# Patient Record
Sex: FEMALE | Race: WHITE | NOT HISPANIC OR LATINO | Employment: UNEMPLOYED | URBAN - NONMETROPOLITAN AREA
[De-identification: names, ages, dates, MRNs, and addresses within clinical notes are randomized per-mention and may not be internally consistent; named-entity substitution may affect disease eponyms.]

---

## 2018-12-21 ENCOUNTER — OFFICE VISIT CONVERTED (OUTPATIENT)
Dept: FAMILY MEDICINE CLINIC | Age: 28
End: 2018-12-21
Attending: NURSE PRACTITIONER

## 2019-01-21 ENCOUNTER — OFFICE VISIT CONVERTED (OUTPATIENT)
Dept: FAMILY MEDICINE CLINIC | Age: 29
End: 2019-01-21
Attending: NURSE PRACTITIONER

## 2019-10-30 ENCOUNTER — HOSPITAL ENCOUNTER (OUTPATIENT)
Dept: OTHER | Facility: HOSPITAL | Age: 29
Discharge: HOME | End: 2019-10-30
Attending: NURSE PRACTITIONER

## 2019-10-30 ENCOUNTER — OFFICE VISIT CONVERTED (OUTPATIENT)
Dept: FAMILY MEDICINE CLINIC | Age: 29
End: 2019-10-30
Attending: NURSE PRACTITIONER

## 2019-10-30 LAB
APPEARANCE UR: CLEAR
BACTERIA UR QL AUTO: ABNORMAL
BILIRUB UR QL: NEGATIVE
C TRACH RRNA CVX QL NAA+PROBE: NOT DETECTED
CASTS URNS QL MICRO: ABNORMAL /[LPF]
COLOR UR: YELLOW
CONV LEUKOCYTE ESTERASE: NEGATIVE
CONV UROBILINOGEN IN URINE BY AUTOMATED TEST STRIP: 0.2 {EHRLICHU}/DL (ref 0.1–1)
EPI CELLS #/AREA URNS HPF: ABNORMAL /[HPF]
GLUCOSE 24H UR-MCNC: NEGATIVE MG/DL
HGB UR QL STRIP: ABNORMAL
KETONES UR QL STRIP: NEGATIVE MG/DL
MUCOUS THREADS URNS QL MICRO: ABNORMAL
N GONORRHOEA DNA SPEC QL NAA+PROBE: NOT DETECTED
NITRITE UR-MCNC: NEGATIVE MG/ML
PH UR STRIP.AUTO: 6 [PH] (ref 5–8)
PROT UR-MCNC: NEGATIVE MG/DL
RBC # BLD AUTO: ABNORMAL /[HPF]
SP GR UR STRIP: 1.01 (ref 1–1.03)
SPECIMEN SOURCE: ABNORMAL
UNIDENT CRYS URNS QL MICRO: ABNORMAL /[HPF]
WBC #/AREA URNS HPF: ABNORMAL /[HPF]

## 2020-01-07 ENCOUNTER — OFFICE VISIT CONVERTED (OUTPATIENT)
Dept: FAMILY MEDICINE CLINIC | Age: 30
End: 2020-01-07
Attending: NURSE PRACTITIONER

## 2020-02-05 ENCOUNTER — OFFICE VISIT CONVERTED (OUTPATIENT)
Dept: FAMILY MEDICINE CLINIC | Age: 30
End: 2020-02-05
Attending: NURSE PRACTITIONER

## 2020-08-10 ENCOUNTER — OFFICE VISIT CONVERTED (OUTPATIENT)
Dept: FAMILY MEDICINE CLINIC | Age: 30
End: 2020-08-10
Attending: NURSE PRACTITIONER

## 2020-08-10 ENCOUNTER — HOSPITAL ENCOUNTER (OUTPATIENT)
Dept: OTHER | Facility: HOSPITAL | Age: 30
Discharge: HOME | End: 2020-08-10
Attending: NURSE PRACTITIONER

## 2020-08-31 ENCOUNTER — HOSPITAL ENCOUNTER (OUTPATIENT)
Dept: OTHER | Facility: HOSPITAL | Age: 30
Discharge: HOME | End: 2020-08-31
Attending: NURSE PRACTITIONER

## 2020-08-31 ENCOUNTER — OFFICE VISIT CONVERTED (OUTPATIENT)
Dept: FAMILY MEDICINE CLINIC | Age: 30
End: 2020-08-31
Attending: NURSE PRACTITIONER

## 2020-08-31 LAB
APPEARANCE UR: CLEAR
BACTERIA UR QL AUTO: NORMAL
BILIRUB UR QL: NEGATIVE
CASTS URNS QL MICRO: NORMAL /[LPF]
COLOR UR: YELLOW
CONV LEUKOCYTE ESTERASE: NEGATIVE
CONV UROBILINOGEN IN URINE BY AUTOMATED TEST STRIP: 0.2 {EHRLICHU}/DL (ref 0.1–1)
EPI CELLS #/AREA URNS HPF: NORMAL /[HPF]
GLUCOSE 24H UR-MCNC: NEGATIVE MG/DL
HGB UR QL STRIP: NEGATIVE
KETONES UR QL STRIP: NEGATIVE MG/DL
MUCOUS THREADS URNS QL MICRO: NORMAL
NITRITE UR-MCNC: NEGATIVE MG/ML
PH UR STRIP.AUTO: 6 [PH] (ref 5–8)
PROT UR-MCNC: NEGATIVE MG/DL
RBC # BLD AUTO: NORMAL /[HPF]
SP GR UR STRIP: 1.01 (ref 1–1.03)
SPECIMEN SOURCE: NORMAL
UNIDENT CRYS URNS QL MICRO: NORMAL /[HPF]
WBC #/AREA URNS HPF: NORMAL /[HPF]

## 2020-11-23 ENCOUNTER — OFFICE VISIT (OUTPATIENT)
Dept: ORTHOPEDIC SURGERY | Facility: CLINIC | Age: 30
End: 2020-11-23

## 2020-11-23 ENCOUNTER — TELEPHONE (OUTPATIENT)
Dept: ORTHOPEDIC SURGERY | Facility: CLINIC | Age: 30
End: 2020-11-23

## 2020-11-23 VITALS — HEIGHT: 62 IN | TEMPERATURE: 98.1 F | BODY MASS INDEX: 30.36 KG/M2 | WEIGHT: 165 LBS

## 2020-11-23 DIAGNOSIS — G56.01 CARPAL TUNNEL SYNDROME OF RIGHT WRIST: Primary | ICD-10-CM

## 2020-11-23 PROCEDURE — 99204 OFFICE O/P NEW MOD 45 MIN: CPT | Performed by: ORTHOPAEDIC SURGERY

## 2020-11-23 NOTE — PROGRESS NOTES
NEW VISIT    Patient: Hanna Matos  ?  YOB: 1990    MRN: [id number]  ?  Chief Complaint   Patient presents with   • Right Wrist - Pain   • Establish Care      ?  HPI: IOV R WRIST  Hanna Sanchez presents to the office with pain and discomfort of the right hand involving the thumb, the middle of the index fingers.  She has weakness of  strength.  She denies any history of trauma to the hand.  She does have numbness and tingling which is worse when she is painting and making signs.  The patient has symptoms at night which do tend to wake her up and she finds that shaking her hand and wrist improve her symptoms significantly for the better.  She also has a difficult time riding in the vehicle because of persistent pain with numbness and tingling of the thumb middle and index fingers.      Pain Location: RIGHT wrist  Radiation: none  Quality: aching, burning  Intensity/Severity: moderate to severe  Duration: 2-3 years  Onset quality: gradual   Timing: constant  Aggravating Factors: rotating motion, repetitive motion  Alleviating Factors: brace  Previous Episodes: yes  Associated Symptoms: pain, swelling, numbness/tingling  ADLs Affected: grooming/hygiene/toileting/personal care, dressing, recreational activities/sports, painting   Previous Treatment: Anti-inflammatory medication and use of a brace.    This patient is a new patient.  This problem is new to this examiner.      Allergies: No Known Allergies    Medications:   Home Medications:  No current outpatient medications on file prior to visit.     No current facility-administered medications on file prior to visit.      Current Medications:  Scheduled Meds:  PRN Meds:.    I have reviewed the patient's medical history in detail and updated the computerized patient record.  Review and summarization of old records include:    Past Medical History:   Diagnosis Date   • Anemia    • Asthma      History reviewed. No pertinent surgical  "history.  Social History     Occupational History   • Not on file   Tobacco Use   • Smoking status: Current Every Day Smoker     Packs/day: 0.50     Years: 22.00     Pack years: 11.00     Types: Cigarettes   • Smokeless tobacco: Never Used   Substance and Sexual Activity   • Alcohol use: Never     Frequency: Never   • Drug use: Defer   • Sexual activity: Defer      Family History   Problem Relation Age of Onset   • Heart disease Other    • Diabetes Other    • Lung cancer Other    • Lymphoma Other    • Breast cancer Other    • Hypertension Other          Review of Systems   Constitutional: Negative.    HENT: Negative.    Eyes: Negative.    Respiratory: Negative.    Cardiovascular: Negative.    Endocrine: Negative.    Genitourinary: Negative.    Musculoskeletal: Positive for arthralgias and joint swelling.   Skin: Negative.    Allergic/Immunologic: Negative.    Neurological: Negative.    Hematological: Negative.    Psychiatric/Behavioral: Negative.           Wt Readings from Last 3 Encounters:   11/23/20 74.8 kg (165 lb)     Ht Readings from Last 3 Encounters:   11/23/20 157.5 cm (62\")     Body mass index is 30.18 kg/m².  Facility age limit for growth percentiles is 20 years.  Vitals:    11/23/20 1542   Temp: 98.1 °F (36.7 °C)         Physical Exam  Constitutional: Patient is oriented to person, place, and time. Appears well-developed and well-nourished.   HENT:   Head: Normocephalic and atraumatic.   Eyes: Conjunctivae and EOM are normal. Pupils are equal, round, and reactive to light.   Cardiovascular: Normal rate, regular rhythm, normal heart sounds and intact distal pulses.   Pulmonary/Chest: Effort normal and breath sounds normal.   Musculoskeletal:   See detailed exam below   Neurological: Alert and oriented to person, place, and time. No sensory deficit. Coordination normal.   Skin: Skin is warm and dry. Capillary refill takes less than 2 seconds. No rash noted. No erythema.   Psychiatric: Patient has a normal " mood and affect. Her behavior is normal. Judgment and thought content normal.   Nursing note and vitals reviewed.      Ortho Exam:   Right wrist-carpal tunnel. The patient is right hand dominant. The Skin is mildly swollen volarly over the proximal crease of the wrist. Radial pulse is palpable. Capillary refill is 2 seconds with a brisk return. Positive Tinel's sign at the wrist. Positive Phalen's sign at the wrist .  strength is impaired.  There is no muscle wasting or atrophy specifically involving the thenar muscle group.  Sensation to light touch and pinprick are diminished over the thumb, index and middle fingers.  Flexor and extensor tendon functions are well preserved. Moving 2 point discrimination is prolonged to 12mm over the median nerve distribution. No evidence of RSD.  There is no clinical evidence of renal disease, thyroid disease or any generalized neurological condition that could be causing nerve dysfunction.    Diagnostics:  EMG Results from [facility] in [place] performed on 10/01/2020:  EMG and nerve conduction study available in the office today.  These images are discussed with the patient.  There is distinct change in the neuronal conduction.  There is moderate compromise of the right median nerve in the region of the carpal tunnel.  There is demyelinating injury involving both the motor and the sensory components of the right median nerve.  There is no axonal damage at this point.  My recommendation is to proceed with carpal tunnel release surgery are based on clinical composite as well as EMG nerve conduction study reports.    Assessment:  Diagnoses and all orders for this visit:    1. Carpal tunnel syndrome of right wrist (Primary)    Other orders  -     SCANNED EMG          Procedures  ?    Plan    · Compression/brace  · Rest, ice, compression, and elevation (RICE) therapy  · Stretching and strengthening exercises  · Alternate Ibuprofen and Tylenol as needed  · Schedule right carpal tunnel  release   Risks of surgical procedure, possibility of infection, DVT, continued pain, limited strength, neurological deficit, scar tissue formation, recurrence of nerve compression  · and further surgical intervention discussed with the patient. Patient understands that surgery will benefit symptoms of pain. Recovery may take several months and may not be complete based on extent of preoperative nerve disease.  I advised the patient of the risks in continuing to use tobacco, and I provided this patient with smoking cessation educational materials.  We discussed using Nicotine gum and/or patches, Hypnotherapy, and Psychological Counseling.   I also discussed how to quit smoking and the patient has expressed the willingness to quit.      During this visit, I spent > 3-10 minutes counseling the patient regarding smoking cessation.   · Time spent in the office today with the patient is 45 minutes of which greater than 50% of the time was spent face-to-face with the patient going over treatment options including the rationale for surgical intervention and time off work.    Date of encounter: 11/23/2020   Osvaldo Strong MD

## 2020-11-25 PROBLEM — G56.01 CARPAL TUNNEL SYNDROME OF RIGHT WRIST: Status: ACTIVE | Noted: 2020-11-25

## 2020-11-25 NOTE — TELEPHONE ENCOUNTER
Patient is aware that surgery will be at [facility] since she does not wish to have surgery on a Monday.    Please enter appropriate surgery orders.

## 2020-11-25 NOTE — TELEPHONE ENCOUNTER
PT. STATES THAT SHE SAW  ON 11/23/20 FOR RIGHT WRIST CARPAL TUNNEL.   SHE IS WAITING FOR SOMEONE TO CALL HER TO SCHEDULE SURGERY.   SHE WOULD PREFER IT TO NOT BE ON A Monday IF POSSIBLE. SHE STATES THAT HER FIANCE HAS TO BRING HER AND HE CANNOT TAKE OFF WORK ON MONDAYS. HE NEEDS TO ASK OFF WORK A COUPLE OF WEEKS AHEAD.   PLEASE CALL TO ADVISE.

## 2020-11-25 NOTE — TELEPHONE ENCOUNTER
Can you please put in orders for a left carpal tunnel release for this patient to be performed at [facility] [place]?  Thank you

## 2020-11-30 ENCOUNTER — PREP FOR SURGERY (OUTPATIENT)
Dept: OTHER | Facility: HOSPITAL | Age: 30
End: 2020-11-30

## 2020-11-30 DIAGNOSIS — G56.01 CARPAL TUNNEL SYNDROME OF RIGHT WRIST: Primary | ICD-10-CM

## 2020-11-30 RX ORDER — CEFAZOLIN SODIUM 2 G/100ML
2 INJECTION, SOLUTION INTRAVENOUS ONCE
Status: CANCELLED | OUTPATIENT
Start: 2021-01-15 | End: 2020-11-30

## 2020-12-02 ENCOUNTER — TELEPHONE (OUTPATIENT)
Dept: ORTHOPEDIC SURGERY | Facility: CLINIC | Age: 30
End: 2020-12-02

## 2020-12-02 NOTE — TELEPHONE ENCOUNTER
PT IS CALLING TO CONFIRM IF SURGERY DATE HAS BEEN MADE.  PLEASE CALL TO DISCUSS.    SHEILA SUBRAMANIAN MAY BE REACHED AT:  [phone]

## 2020-12-09 ENCOUNTER — OFFICE VISIT CONVERTED (OUTPATIENT)
Dept: FAMILY MEDICINE CLINIC | Age: 30
End: 2020-12-09
Attending: NURSE PRACTITIONER

## 2021-01-13 ENCOUNTER — APPOINTMENT (OUTPATIENT)
Dept: PREADMISSION TESTING | Facility: HOSPITAL | Age: 31
End: 2021-01-13

## 2021-01-13 VITALS
SYSTOLIC BLOOD PRESSURE: 117 MMHG | RESPIRATION RATE: 16 BRPM | BODY MASS INDEX: 33.61 KG/M2 | DIASTOLIC BLOOD PRESSURE: 71 MMHG | WEIGHT: 178 LBS | HEIGHT: 61 IN | OXYGEN SATURATION: 99 % | HEART RATE: 78 BPM | TEMPERATURE: 98.1 F

## 2021-01-13 LAB
DEPRECATED RDW RBC AUTO: 41.4 FL (ref 37–54)
ERYTHROCYTE [DISTWIDTH] IN BLOOD BY AUTOMATED COUNT: 12.1 % (ref 12.3–15.4)
HCG SERPL QL: NEGATIVE
HCT VFR BLD AUTO: 40.7 % (ref 34–46.6)
HGB BLD-MCNC: 13.7 G/DL (ref 12–15.9)
MCH RBC QN AUTO: 31.9 PG (ref 26.6–33)
MCHC RBC AUTO-ENTMCNC: 33.7 G/DL (ref 31.5–35.7)
MCV RBC AUTO: 94.7 FL (ref 79–97)
PLATELET # BLD AUTO: 234 10*3/MM3 (ref 140–450)
PMV BLD AUTO: 9.6 FL (ref 6–12)
RBC # BLD AUTO: 4.3 10*6/MM3 (ref 3.77–5.28)
WBC # BLD AUTO: 6.48 10*3/MM3 (ref 3.4–10.8)

## 2021-01-13 PROCEDURE — C9803 HOPD COVID-19 SPEC COLLECT: HCPCS

## 2021-01-13 PROCEDURE — 36415 COLL VENOUS BLD VENIPUNCTURE: CPT

## 2021-01-13 PROCEDURE — U0004 COV-19 TEST NON-CDC HGH THRU: HCPCS

## 2021-01-13 PROCEDURE — 84703 CHORIONIC GONADOTROPIN ASSAY: CPT

## 2021-01-13 PROCEDURE — 85027 COMPLETE CBC AUTOMATED: CPT

## 2021-01-13 RX ORDER — IBUPROFEN 200 MG
200 TABLET ORAL EVERY 6 HOURS PRN
COMMUNITY
End: 2021-06-22

## 2021-01-13 NOTE — DISCHARGE INSTRUCTIONS
Take the following medications the morning of surgery:  NONE    If you are on prescription narcotic pain medication to control your pain you may also take that medication the morning of surgery.    PLEASE ARRIVE AT THE HOSPITAL AT 6 AM ON January 15, 2021    General Instructions:  • Do not eat solid food after midnight the night before surgery.  • You may drink clear liquids day of surgery but must stop at least one hour before your hospital arrival time.  • NOTHING TO DRINK AFTER 5 AM  • It is beneficial for you to have a clear drink that contains carbohydrates the day of surgery.  We suggest a 12 to 20 ounce bottle of Gatorade or Powerade for non-diabetic patients or a 12 to 20 ounce bottle of G2 or Powerade Zero for diabetic patients. (Pediatric patients, are not advised to drink a 12 to 20 ounce carbohydrate drink)    Clear liquids are liquids you can see through.  Nothing red in color.     Plain water                               Sports drinks  Sodas                                   Gelatin (Jell-O)  Fruit juices without pulp such as white grape juice and apple juice  Popsicles that contain no fruit or yogurt  Tea or coffee (no cream or milk added)  Gatorade / Powerade  G2 / Powerade Zero    • Infants may have breast milk up to four hours before surgery.  • Infants drinking formula may drink formula up to six hours before surgery.   • Patients who avoid smoking, chewing tobacco and alcohol for 4 weeks prior to surgery have a reduced risk of post-operative complications.  Quit smoking as many days before surgery as you can.  • Do not smoke, use chewing tobacco or drink alcohol the day of surgery.   • If applicable bring your C-PAP/ BI-PAP machine.  • Bring any papers given to you in the doctor’s office.  • Wear clean comfortable clothes.  • Do not wear contact lenses, false eyelashes or make-up.  Bring a case for your glasses.   • Bring crutches or walker if applicable.  • Remove all piercings.  Leave jewelry  and any other valuables at home.  • Hair extensions with metal clips must be removed prior to surgery.  • The Pre-Admission Testing nurse will instruct you to bring medications if unable to obtain an accurate list in Pre-Admission Testing.      Preventing a Surgical Site Infection:  • For 2 to 3 days before surgery, avoid shaving with a razor because the razor can irritate skin and make it easier to develop an infection.    • Any areas of open skin can increase the risk of a post-operative wound infection by allowing bacteria to enter and travel throughout the body.  Notify your surgeon if you have any skin wounds / rashes even if it is not near the expected surgical site.  The area will need assessed to determine if surgery should be delayed until it is healed.  • Sleep in a clean bed with clean clothing.  Do not allow pets to sleep with you.  • Shower on the morning of surgery using a fresh bar of anti-bacterial soap (such as Dial) and clean washcloth.  Dry with a clean towel and dress in clean clothing.  • Ask your surgeon if you will be receiving antibiotics prior to surgery.  • Make sure you, your family, and all healthcare providers clean their hands with soap and water or an alcohol based hand  before caring for you or your wound.    Day of surgery:  Your arrival time is approximately two hours before your scheduled surgery time.  Upon arrival, a Pre-op nurse and Anesthesiologist will review your health history, obtain vital signs, and answer questions you may have.  The only belongings needed at this time will be a list of your home medications and if applicable your C-PAP/BI-PAP machine.  A Pre-op nurse will start an IV and you may receive medication in preparation for surgery, including something to help you relax.     Please be aware that surgery does come with discomfort.  We want to make every effort to control your discomfort so please discuss any uncontrolled symptoms with your nurse.   Your  doctor will most likely have prescribed pain medications.      If you are going home after surgery you will receive individualized written care instructions before being discharged.  A responsible adult must drive you to and from the hospital on the day of your surgery and stay with you for 24 hours.  Discharge prescriptions can be filled by the hospital pharmacy during regular pharmacy hours.  If you are having surgery late in the day/evening your prescription may be e-prescribed to your pharmacy.  Please verify your pharmacy hours or chose a 24 hour pharmacy to avoid not having access to your prescription because your pharmacy has closed for the day.    If you are staying overnight following surgery, you will be transported to your hospital room following the recovery period.  [facility] has all private rooms.    If you have any questions please call Pre-Admission Testing at [phone].  Deductibles and co-payments are collected on the day of service. Please be prepared to pay the required co-pay, deductible or deposit on the day of service as defined by your plan.    Patient Education for Self-Quarantine Process    Following your COVID testing, we strongly recommend that you do not leave your home after you have been tested for COVID except to get medical care. This includes not going to work, school or to public areas.  If this is not possible for you to do please limit your activities to only required outings.  Be sure to wear a mask when you are with other people, practice social distancing and wash your hands frequently.      The following items provide additional details to keep you safe.  • Wash your hands with soap and water frequently for at least 20 seconds.   • Avoid touching your eyes, nose and mouth with unwashed hands.  • Do not share anything - utensils, towels, food from the same bowl.   • Have your own utensils, drinking glass, dishes, towels and bedding.   • Do not have  visitors.   • Do use FaceTime to stay in touch with family and friends.  • You should stay in a specific room away from others if possible.   • Stay at least 6 feet away from others in the home if you cannot have a dedicated room to yourself.   • Do not snuggle with your pet. While the CDC says there is no evidence that pets can spread COVID-19 or be infected from humans, it is probably best to avoid “petting, snuggling, being kissed or licked and sharing food (during self-quarantine)”, according to the CDC.   • Sanitize household surfaces daily. Include all high touch areas (door handles, light switches, phones, countertops, etc.)  • Do not share a bathroom with others, if possible.   • Wear a mask around others in your home if you are unable to stay in a separate room or 6 feet apart. If  you are unable to wear a mask, have your family member wear a mask if they must be within 6 feet of you.   Call your surgeon immediately if you experience any of the following symptoms:  • Sore Throat  • Shortness of Breath or difficulty breathing  • Cough  • Chills  • Body soreness or muscle pain  • Headache  • Fever  • New loss of taste or smell  • Do not arrive for your surgery ill.  Your procedure will need to be rescheduled to another time.  You will need to call your physician before the day of surgery to avoid any unnecessary exposure to hospital staff as well as other patients.

## 2021-01-14 LAB — SARS-COV-2 ORF1AB RESP QL NAA+PROBE: NOT DETECTED

## 2021-01-15 ENCOUNTER — ANESTHESIA (OUTPATIENT)
Dept: PERIOP | Facility: HOSPITAL | Age: 31
End: 2021-01-15

## 2021-01-15 ENCOUNTER — ANESTHESIA EVENT (OUTPATIENT)
Dept: PERIOP | Facility: HOSPITAL | Age: 31
End: 2021-01-15

## 2021-01-15 ENCOUNTER — HOSPITAL ENCOUNTER (OUTPATIENT)
Facility: HOSPITAL | Age: 31
Setting detail: HOSPITAL OUTPATIENT SURGERY
Discharge: HOME | End: 2021-01-15
Attending: ORTHOPAEDIC SURGERY | Admitting: ORTHOPAEDIC SURGERY

## 2021-01-15 VITALS
SYSTOLIC BLOOD PRESSURE: 103 MMHG | DIASTOLIC BLOOD PRESSURE: 67 MMHG | HEART RATE: 66 BPM | RESPIRATION RATE: 16 BRPM | OXYGEN SATURATION: 97 % | TEMPERATURE: 97.9 F

## 2021-01-15 DIAGNOSIS — G56.01 CARPAL TUNNEL SYNDROME OF RIGHT WRIST: Primary | ICD-10-CM

## 2021-01-15 DIAGNOSIS — G56.01 CARPAL TUNNEL SYNDROME OF RIGHT WRIST: ICD-10-CM

## 2021-01-15 PROCEDURE — L3908 WHO COCK-UP NONMOLDE PRE OTS: HCPCS | Performed by: ORTHOPAEDIC SURGERY

## 2021-01-15 PROCEDURE — 25010000002 MIDAZOLAM PER 1 MG: Performed by: ANESTHESIOLOGY

## 2021-01-15 PROCEDURE — S0260 H&P FOR SURGERY: HCPCS | Performed by: ORTHOPAEDIC SURGERY

## 2021-01-15 PROCEDURE — 25010000002 FENTANYL CITRATE (PF) 100 MCG/2ML SOLUTION: Performed by: ANESTHESIOLOGY

## 2021-01-15 PROCEDURE — 25010000002 PROPOFOL 10 MG/ML EMULSION: Performed by: NURSE ANESTHETIST, CERTIFIED REGISTERED

## 2021-01-15 PROCEDURE — 64721 CARPAL TUNNEL SURGERY: CPT | Performed by: ORTHOPAEDIC SURGERY

## 2021-01-15 PROCEDURE — 25010000002 ROPIVACAINE PER 1 MG: Performed by: ANESTHESIOLOGY

## 2021-01-15 PROCEDURE — 25010000003 CEFAZOLIN IN DEXTROSE 2-4 GM/100ML-% SOLUTION: Performed by: ORTHOPAEDIC SURGERY

## 2021-01-15 RX ORDER — LIDOCAINE HYDROCHLORIDE 20 MG/ML
INJECTION, SOLUTION INFILTRATION; PERINEURAL AS NEEDED
Status: DISCONTINUED | OUTPATIENT
Start: 2021-01-15 | End: 2021-01-15 | Stop reason: SURG

## 2021-01-15 RX ORDER — EPHEDRINE SULFATE 50 MG/ML
5 INJECTION, SOLUTION INTRAVENOUS ONCE AS NEEDED
Status: DISCONTINUED | OUTPATIENT
Start: 2021-01-15 | End: 2021-01-15 | Stop reason: HOSPADM

## 2021-01-15 RX ORDER — NALOXONE HCL 0.4 MG/ML
0.2 VIAL (ML) INJECTION AS NEEDED
Status: DISCONTINUED | OUTPATIENT
Start: 2021-01-15 | End: 2021-01-15 | Stop reason: HOSPADM

## 2021-01-15 RX ORDER — SODIUM CHLORIDE 0.9 % (FLUSH) 0.9 %
3 SYRINGE (ML) INJECTION EVERY 12 HOURS SCHEDULED
Status: DISCONTINUED | OUTPATIENT
Start: 2021-01-15 | End: 2021-01-15 | Stop reason: HOSPADM

## 2021-01-15 RX ORDER — LIDOCAINE HYDROCHLORIDE 5 MG/ML
INJECTION, SOLUTION INFILTRATION; PERINEURAL AS NEEDED
Status: DISCONTINUED | OUTPATIENT
Start: 2021-01-15 | End: 2021-01-15 | Stop reason: HOSPADM

## 2021-01-15 RX ORDER — LIDOCAINE HYDROCHLORIDE 10 MG/ML
0.5 INJECTION, SOLUTION EPIDURAL; INFILTRATION; INTRACAUDAL; PERINEURAL ONCE AS NEEDED
Status: DISCONTINUED | OUTPATIENT
Start: 2021-01-15 | End: 2021-01-15 | Stop reason: HOSPADM

## 2021-01-15 RX ORDER — ONDANSETRON 2 MG/ML
4 INJECTION INTRAMUSCULAR; INTRAVENOUS ONCE AS NEEDED
Status: DISCONTINUED | OUTPATIENT
Start: 2021-01-15 | End: 2021-01-15 | Stop reason: HOSPADM

## 2021-01-15 RX ORDER — HYDROCODONE BITARTRATE AND ACETAMINOPHEN 7.5; 325 MG/1; MG/1
1 TABLET ORAL ONCE AS NEEDED
Status: DISCONTINUED | OUTPATIENT
Start: 2021-01-15 | End: 2021-01-15 | Stop reason: HOSPADM

## 2021-01-15 RX ORDER — OXYCODONE AND ACETAMINOPHEN 7.5; 325 MG/1; MG/1
1 TABLET ORAL ONCE AS NEEDED
Status: DISCONTINUED | OUTPATIENT
Start: 2021-01-15 | End: 2021-01-15 | Stop reason: HOSPADM

## 2021-01-15 RX ORDER — PROMETHAZINE HYDROCHLORIDE 25 MG/1
25 TABLET ORAL ONCE AS NEEDED
Status: DISCONTINUED | OUTPATIENT
Start: 2021-01-15 | End: 2021-01-15 | Stop reason: HOSPADM

## 2021-01-15 RX ORDER — HYDROMORPHONE HYDROCHLORIDE 1 MG/ML
0.5 INJECTION, SOLUTION INTRAMUSCULAR; INTRAVENOUS; SUBCUTANEOUS
Status: DISCONTINUED | OUTPATIENT
Start: 2021-01-15 | End: 2021-01-15 | Stop reason: HOSPADM

## 2021-01-15 RX ORDER — PROMETHAZINE HYDROCHLORIDE 25 MG/1
25 SUPPOSITORY RECTAL ONCE AS NEEDED
Status: DISCONTINUED | OUTPATIENT
Start: 2021-01-15 | End: 2021-01-15 | Stop reason: HOSPADM

## 2021-01-15 RX ORDER — DIPHENHYDRAMINE HYDROCHLORIDE 50 MG/ML
12.5 INJECTION INTRAMUSCULAR; INTRAVENOUS
Status: DISCONTINUED | OUTPATIENT
Start: 2021-01-15 | End: 2021-01-15 | Stop reason: HOSPADM

## 2021-01-15 RX ORDER — DIPHENHYDRAMINE HCL 25 MG
25 CAPSULE ORAL
Status: DISCONTINUED | OUTPATIENT
Start: 2021-01-15 | End: 2021-01-15 | Stop reason: HOSPADM

## 2021-01-15 RX ORDER — ROPIVACAINE HYDROCHLORIDE 5 MG/ML
INJECTION, SOLUTION EPIDURAL; INFILTRATION; PERINEURAL
Status: COMPLETED | OUTPATIENT
Start: 2021-01-15 | End: 2021-01-15

## 2021-01-15 RX ORDER — DOCUSATE SODIUM 100 MG/1
100 CAPSULE, LIQUID FILLED ORAL 2 TIMES DAILY
Qty: 30 CAPSULE | Refills: 0 | Status: SHIPPED | OUTPATIENT
Start: 2021-01-15 | End: 2021-06-22

## 2021-01-15 RX ORDER — PROPOFOL 10 MG/ML
VIAL (ML) INTRAVENOUS AS NEEDED
Status: DISCONTINUED | OUTPATIENT
Start: 2021-01-15 | End: 2021-01-15 | Stop reason: SURG

## 2021-01-15 RX ORDER — FAMOTIDINE 10 MG/ML
20 INJECTION, SOLUTION INTRAVENOUS ONCE
Status: COMPLETED | OUTPATIENT
Start: 2021-01-15 | End: 2021-01-15

## 2021-01-15 RX ORDER — PROPOFOL 10 MG/ML
VIAL (ML) INTRAVENOUS CONTINUOUS PRN
Status: DISCONTINUED | OUTPATIENT
Start: 2021-01-15 | End: 2021-01-15 | Stop reason: SURG

## 2021-01-15 RX ORDER — FLUMAZENIL 0.1 MG/ML
0.2 INJECTION INTRAVENOUS AS NEEDED
Status: DISCONTINUED | OUTPATIENT
Start: 2021-01-15 | End: 2021-01-15 | Stop reason: HOSPADM

## 2021-01-15 RX ORDER — SODIUM CHLORIDE, SODIUM LACTATE, POTASSIUM CHLORIDE, CALCIUM CHLORIDE 600; 310; 30; 20 MG/100ML; MG/100ML; MG/100ML; MG/100ML
9 INJECTION, SOLUTION INTRAVENOUS CONTINUOUS
Status: DISCONTINUED | OUTPATIENT
Start: 2021-01-15 | End: 2021-01-15 | Stop reason: HOSPADM

## 2021-01-15 RX ORDER — HYDROCODONE BITARTRATE AND ACETAMINOPHEN 5; 325 MG/1; MG/1
1 TABLET ORAL SEE ADMIN INSTRUCTIONS
Qty: 30 TABLET | Refills: 0 | Status: SHIPPED | OUTPATIENT
Start: 2021-01-15 | End: 2021-06-22

## 2021-01-15 RX ORDER — FENTANYL CITRATE 50 UG/ML
50 INJECTION, SOLUTION INTRAMUSCULAR; INTRAVENOUS
Status: DISCONTINUED | OUTPATIENT
Start: 2021-01-15 | End: 2021-01-15 | Stop reason: HOSPADM

## 2021-01-15 RX ORDER — LABETALOL HYDROCHLORIDE 5 MG/ML
5 INJECTION, SOLUTION INTRAVENOUS
Status: DISCONTINUED | OUTPATIENT
Start: 2021-01-15 | End: 2021-01-15 | Stop reason: HOSPADM

## 2021-01-15 RX ORDER — CEFAZOLIN SODIUM 2 G/100ML
2 INJECTION, SOLUTION INTRAVENOUS ONCE
Status: COMPLETED | OUTPATIENT
Start: 2021-01-15 | End: 2021-01-15

## 2021-01-15 RX ORDER — SODIUM CHLORIDE 0.9 % (FLUSH) 0.9 %
3-10 SYRINGE (ML) INJECTION AS NEEDED
Status: DISCONTINUED | OUTPATIENT
Start: 2021-01-15 | End: 2021-01-15 | Stop reason: HOSPADM

## 2021-01-15 RX ORDER — MIDAZOLAM HYDROCHLORIDE 1 MG/ML
1 INJECTION INTRAMUSCULAR; INTRAVENOUS
Status: DISCONTINUED | OUTPATIENT
Start: 2021-01-15 | End: 2021-01-15 | Stop reason: HOSPADM

## 2021-01-15 RX ORDER — MAGNESIUM HYDROXIDE 1200 MG/15ML
LIQUID ORAL AS NEEDED
Status: DISCONTINUED | OUTPATIENT
Start: 2021-01-15 | End: 2021-01-15 | Stop reason: HOSPADM

## 2021-01-15 RX ADMIN — PROPOFOL 50 MG: 10 INJECTION, EMULSION INTRAVENOUS at 09:11

## 2021-01-15 RX ADMIN — FENTANYL CITRATE 50 MCG: 50 INJECTION, SOLUTION INTRAMUSCULAR; INTRAVENOUS at 07:52

## 2021-01-15 RX ADMIN — PROPOFOL 140 MCG/KG/MIN: 10 INJECTION, EMULSION INTRAVENOUS at 09:11

## 2021-01-15 RX ADMIN — MIDAZOLAM 1 MG: 1 INJECTION INTRAMUSCULAR; INTRAVENOUS at 07:52

## 2021-01-15 RX ADMIN — LIDOCAINE HYDROCHLORIDE 50 MG: 20 INJECTION, SOLUTION INFILTRATION; PERINEURAL at 09:11

## 2021-01-15 RX ADMIN — SODIUM CHLORIDE, POTASSIUM CHLORIDE, SODIUM LACTATE AND CALCIUM CHLORIDE 9 ML/HR: 600; 310; 30; 20 INJECTION, SOLUTION INTRAVENOUS at 07:35

## 2021-01-15 RX ADMIN — CEFAZOLIN SODIUM 2 G: 2 INJECTION, SOLUTION INTRAVENOUS at 09:07

## 2021-01-15 RX ADMIN — FAMOTIDINE 20 MG: 10 INJECTION INTRAVENOUS at 07:35

## 2021-01-15 RX ADMIN — ROPIVACAINE HYDROCHLORIDE 20 ML: 5 INJECTION, SOLUTION EPIDURAL; INFILTRATION; PERINEURAL at 07:50

## 2021-01-15 NOTE — ANESTHESIA PREPROCEDURE EVALUATION
Anesthesia Evaluation     Patient summary reviewed and Nursing notes reviewed   history of anesthetic complications: PONV  NPO Solid Status: > 8 hours  NPO Liquid Status: > 2 hours           Airway   Mallampati: II  TM distance: >3 FB  Neck ROM: full  Dental - normal exam     Pulmonary - normal exam   (+) a smoker Current Smoked day of surgery, asthma,  Cardiovascular - negative cardio ROS and normal exam        Neuro/Psych  (+) numbness,     GI/Hepatic/Renal/Endo    (+) obesity,       Musculoskeletal (-) negative ROS    Abdominal    Substance History - negative use     OB/GYN negative ob/gyn ROS         Other                        Anesthesia Plan    ASA 2     general with block       Anesthetic plan, all risks, benefits, and alternatives have been provided, discussed and informed consent has been obtained with: patient.

## 2021-01-15 NOTE — OP NOTE
CARPAL TUNNEL RELEASE:  PATIENT NAME: Hanna Matos   PATIENT : 1990   DATE OF PROCEDURE: 1/15/2021   PRINCIPAL DIAGNOSIS: Carpal tunnel syndrome right upper extremity.  SECONDARY DIAGNOSIS: Median nerve early demyelination of the right upper extremity.  POSTOPERATIVE DIAGNOSIS: Same.  PROCEDURE PERFORMED: Right carpal tunnel release - mini open.  SURGEON: MERVIN FRYE M.D.  ASSISTANT: first Hector assistant was responsible for performing the following activities: Retraction, Suction, Irrigation, Suturing, Closing and Placing Dressing and their skilled assistance was necessary for the success of this case.    ANESTHESIOLOGIST: Dr. Sky MD  ANESTHESIA USED: General with a block  ANALGESIC PROCEDURE USED: 10 cc of half percent Marcaine plain.  ESTIMATED BLOOD LOSS: minimal  SPECIMENS: * No orders in the log *  IMPLANTS USED: None  COMPLICATIONS (IF ANY): None    INDICATIONS:  The patient has been having increasing pain, numbness and tingling in the upper extremity. Worse when the patient is driving. Difficulty sleeping in bed at night. Patient finds them self shaking their fingers and hand to restore sensation. The sensation is singularly involved in the median nerve territory. Patient is having difficulty with  strength both with power  and pinch . EMG and nerve conduction study matches the patient’s clinical findings. All risks, benefits, potential complications of surgical intervention were discussed with the patient in great detail. All questions were answered for the patient.     PROCEDURE:  Surgical timeout was called. Operative extremity was correctly identified in the operating room suite. Patient was administered antibiotics per the SCIP protocol. Patient was placed under appropriate anesthesia. The arm was prepped and draped in the standard fashion. Volar approach was carried out along the axis of the median nerve in line with the 4th metacarpal axis. The transverse  carpal ligament was identified. It was thickened and hypertrophic.  It was fully released from proximal to distal.  The proximal extent of the release was up to the deep fascia of the forearm. The distal part of the release was up to the superficial palmar arch in the wrist. The median nerve was found to be compressed and pale. It was fully released. Hemostasis was obtained. Wounds were lavaged with Bacitracin irrigating solution. Half percent Naropin plain was used locally for postoperative analgesia. Subcuticular sutures applied. Occlusive dressings applied. The skin incision was infiltrated with local anesthetic for postoperative analgesia.       A volar splint was applied with the wrist is a slight amount of dorsiflexion to prevent a volar bowstringing of the nerve. The patient tolerated the procedure well. Was reversed from anesthetic and taken from the operating room to the recovery room in stable, satisfactory condition. Sponge, gauze and needle count was correct.  I discussed a satisfactory performance of this procedure with the patient’s family and answered all questions for them.    Osvaldo Strong MD  1/15/2021  09:49 EST

## 2021-01-15 NOTE — ANESTHESIA POSTPROCEDURE EVALUATION
Patient: Hanna Matos    Procedure Summary     Date: 01/15/21 Room / Location:  [facility]  /  [facility]    Anesthesia Start: 0905 Anesthesia Stop: 0946    Procedure: Right Carpal Tunnel Release (Right Wrist) Diagnosis:       Carpal tunnel syndrome of right wrist      (Carpal tunnel syndrome of right wrist [G56.01])    Surgeon: Osvaldo Strong MD Provider: Rojelio Swanson MD    Anesthesia Type: general with block ASA Status: 2          Anesthesia Type: general with block    Vitals  Vitals Value Taken Time   /67 01/15/21 0955   Temp     Pulse 66 01/15/21 0955   Resp 16 01/15/21 0955   SpO2 97 % 01/15/21 0955           Post Anesthesia Care and Evaluation    Patient location during evaluation: bedside  Patient participation: complete - patient participated  Level of consciousness: awake  Pain management: adequate  Airway patency: patent  Anesthetic complications: No anesthetic complications    Cardiovascular status: acceptable  Respiratory status: acceptable  Hydration status: acceptable    Comments: */67   Pulse 66   Temp 36.6 °C (97.9 °F) (Oral)   Resp 16   LMP 01/10/2021   SpO2 97%

## 2021-01-15 NOTE — ANESTHESIA PROCEDURE NOTES
Peripheral Block    Pre-sedation assessment completed: 1/15/2021 7:41 AM    Patient reassessed immediately prior to procedure    Patient location during procedure: holding area  Start time: 1/15/2021 7:51 AM  Stop time: 1/15/2021 7:50 AM  Reason for block: at surgeon's request and post-op pain management  Performed by  Anesthesiologist: Naeem Gregg MD  Preanesthetic Checklist  Completed: patient identified, site marked, surgical consent, pre-op evaluation, timeout performed, IV checked, risks and benefits discussed and monitors and equipment checked  Prep:  Sterile barriers:cap, gloves and mask  Prep: ChloraPrep  Patient monitoring: blood pressure monitoring, continuous pulse oximetry and EKG  Procedure  Sedation:yes  Performed under: local infiltration  Guidance:ultrasound guided  ULTRASOUND INTERPRETATION.  Using ultrasound guidance a 22 G gauge needle was placed in close proximity to the brachial plexus nerve, at which point, under ultrasound guidance anesthetic was injected in the area of the nerve and spread of the anesthesia was seen on ultrasound in close proximity thereto.  There were no abnormalities seen on ultrasound; a digital image was taken; and the patient tolerated the procedure with no complications. Images:still images obtained    Laterality:right  Block Type:wrist  Injection Technique:single-shotNeedle Gauge:25 G  Resistance on Injection: none    Medications Used: ropivacaine (NAROPIN) 0.5 % injection, 20 mL  Med admintered at 1/15/2021 7:50 AM      Post Assessment  Injection Assessment: negative aspiration for heme, no paresthesia on injection and incremental injection  Patient Tolerance:comfortable throughout block  Complications:no  Additional Notes  Ultrasound Interpretation:  Using ultrasound guidance the needle was placed in close proximity to the median nerve and anesthetic was injected in the area of the median nerve and spread of the anesthetic was seen on ultrasound in close  proximity thereto.  There were no abnormalities seen on ultrasound; a digital image was taken; and the patient tolerated the procedure with no complications.    Block placed for postoperative pain control per surgeon request.

## 2021-01-15 NOTE — TELEPHONE ENCOUNTER
PATIENT IS HAVING SURGERY TODAY AT [facility] IN AM. PER DR. FRYE SEND IN RX ELECTRONICALLY. NORCO 5/325MG TAB ONE PO Q 4-6 PRN PAIN # 20 NO REFILLS/RJ

## 2021-01-15 NOTE — H&P
History & Physical       Patient: Hanna Matos    Date of Admission: 1/15/2021  5:42 AM    YOB: 1990    Medical Record Number: [id number]    Attending Physician: Osvaldo Srtong MD        Chief Complaints: Carpal tunnel syndrome of right wrist [G56.01]      History of Present Illness: 30 y.o. female presents with Carpal tunnel syndrome of right wrist [G56.01]. Onset of symptoms was gradual and asociated with numbness and tingling.  Symptoms are associated with weak  strength.  Symptoms are aggravated by repetitive motion.   Symptoms improve with abrace nd nsaids. Patient is now being admitted to the services of Osvaldo Strong MD for further evaluation and treatment.      No Known Allergies      Home Medications:  Medications Prior to Admission   Medication Sig Dispense Refill Last Dose   • ibuprofen (ADVIL,MOTRIN) 200 MG tablet Take 200 mg by mouth Every 6 (Six) Hours As Needed for Mild Pain . HOLDING FOR SURGERY   1/11/2021       Current Medications:  Scheduled Meds:ceFAZolin, 2 g, Intravenous, Once      Continuous Infusions:   PRN Meds:.       Past Medical History:   Diagnosis Date   • Anemia    • Asthma    • Carpal tunnel syndrome     RIGHT   • PONV (postoperative nausea and vomiting)         Past Surgical History:   Procedure Laterality Date   • CERVICAL BIOPSY  W/ LOOP ELECTRODE EXCISION     • TUBAL ABDOMINAL LIGATION          Social History     Occupational History   • Not on file   Tobacco Use   • Smoking status: Current Every Day Smoker     Packs/day: 0.50     Years: 22.00     Pack years: 11.00     Types: Cigarettes   • Smokeless tobacco: Never Used   Substance and Sexual Activity   • Alcohol use: Never     Frequency: Never   • Drug use: Never   • Sexual activity: Not on file      Social History     Social History Narrative   • Not on file        Family History   Problem Relation Age of Onset   • Heart disease Other    • Diabetes Other    • Lung cancer Other    • Lymphoma Other    •  Breast cancer Other    • Hypertension Other    • Malig Hyperthermia Neg Hx          Review of Systems:   HEENT: Patient denies any headaches, vision changes, change in hearing, or tinnitus, Patient denies any rhinorrhea,epistaxis, sinus pain, mouth or dental problems, sore throat or hoarseness, or dysphagia  Pulmonary: Patient denies any cough, congestion, SOA, or wheezing  Cardiovascular: Patient denies any chest pain, dyspnea, palpitations, weakness, intolerance of exercise, varicosities, swelling of extremities, known murmur  Gastrointestinal:  Patient denies nausea, vomiting, diarrhea, constipation, loss  of appetite, change in appetite, dysphagia, gas, heartburn, melena, change in bowel habits, use of laxatives or other drugs to alter the function of the gastrointestinal tract.  Genital/Urinary: Patient denies dysuria, change in color of urine, change in frequency of urination, pain with urgency, incontinence, retention, or nocturia.  Musculoskeletal: Patient denies increased warmth; redness; or swelling of joints; limitation of function; deformity; crepitation: pain in a joint or an extremity, the neck, or the back, especially with movement.  Neurological: Patient denies dizziness, tremor, ataxia, difficulty in speaking, change in speech, paresthesia, loss of sensation, seizures, syncope, changes in memory.  Endocrine system: Patient denies tremors, palpitations, intolerance of heat or cold, polyuria, polydipsia, polyphagia, diaphoresis, exophthalmos, or goiter.  Psychological: Patient denies thoughts/plans or harming self or other; depression,  insomnia, night terrors, greer, memory loss, disorientation.  Skin: Patient denies any bruising, rashes, discoloration, pruritus, wounds, ulcers, decubiti, changes in the hair or nails  Hematopoietic: Patient denies history of spontaneous or excessive bleeding, epistaxis, hematuria, melena, fatigue, enlarged or tender lymph nodes, pallor, history of anemia.    Physical  Exam: 30 y.o. female  Vitals:    01/15/21 0654   BP: 110/64   BP Location: Left arm   Patient Position: Lying   Pulse: 58   Resp: 16   Temp: 97.9 °F (36.6 °C)   TempSrc: Oral   SpO2: 97%       General Appearance:          Alert, cooperative, in no acute distress                                                 Head:    Normocephalic, without obvious abnormality, atraumatic   Eyes:            Lids and lashes normal, conjunctivae and sclerae normal, no   icterus, no pallor, corneas clear, PERRLA   Ears:    Ears appear intact with no abnormalities noted   Throat:   No oral lesions, no thrush, oral mucosa moist   Neck:   No adenopathy, supple, trachea midline, no thyromegaly, no   carotid bruit, no JVD   Back:     No kyphosis present, no scoliosis present, no skin lesions,      erythema or scars, no tenderness to percussion or                   palpation,   range of motion normal   Lungs:     Clear to auscultation,respirations regular, even and                  unlabored    Heart:    Regular rhythm and normal rate, normal S1 and S2, no            murmur, no gallop, no rub, no click   Chest Wall:    No abnormalities observed   Abdomen:     Normal bowel sounds, no masses, no organomegaly, soft        nontender, nondistended, no guarding, no rebound                tenderness   Rectal:     Deferred   Extremities:   Tenderness over volar aspect of right wrist   . Moves all extremities well, no edema,   no cyanosis, no redness   Pulses:   Pulses palpable and equal bilaterally   Skin:   No bleeding, bruising or rash   Lymph nodes:   No palpable adenopathy   Neurologic:   Cranial nerves 2 - 12 grossly intact, sensation intact, DTR       present and equal bilaterally      right wrist. The Skin is  mildly swollen volarly over the proximal crease of the wrist. Radial pulse is palpable. Capillary refill is 2 seconds with a brisk return. Positive Tinel’s sign at the wrist. Positive Phalen’s sign at the wrist .  strength is  impaired.  There is no muscle wasting or atrophy specifically involving the thenar muscle group.  Sensation to light touch and pinprick are diminished over the thumb, index and middle fingers.  Flexor and extensor tendon functions are well preserved. Moving 2 point discrimination is prolonged to 12mm over the median nerve distribution. No evidence of RSD.  There is no clinical evidence of renal disease, thyroid disease or any generalized neurological condition that could be causing nerve dysfunction.     Diagnostic Tests:  No visits with results within 2 Day(s) from this visit.   Latest known visit with results is:   Appointment on 01/13/2021   Component Date Value Ref Range Status   • WBC 01/13/2021 6.48  3.40 - 10.80 10*3/mm3 Final   • RBC 01/13/2021 4.30  3.77 - 5.28 10*6/mm3 Final   • Hemoglobin 01/13/2021 13.7  12.0 - 15.9 g/dL Final   • Hematocrit 01/13/2021 40.7  34.0 - 46.6 % Final   • MCV 01/13/2021 94.7  79.0 - 97.0 fL Final   • MCH 01/13/2021 31.9  26.6 - 33.0 pg Final   • MCHC 01/13/2021 33.7  31.5 - 35.7 g/dL Final   • RDW 01/13/2021 12.1* 12.3 - 15.4 % Final   • RDW-SD 01/13/2021 41.4  37.0 - 54.0 fl Final   • MPV 01/13/2021 9.6  6.0 - 12.0 fL Final   • Platelets 01/13/2021 234  140 - 450 10*3/mm3 Final   • HCG Qualitative 01/13/2021 Negative  Negative Final   • SARS-CoV-2 PCR 01/13/2021 Not Detected  Not Detected Final    Nucleic acid specific to SARS-CoV-2 (COVID-19) virus was not detected in  this sample by the Aptima (R) SARS-CoV-2 Assay.                SARS-CoV-2 (COVID-19) nucleic acid testing performed using [employer] Aptima (R) SARS-CoV-2 Assay or [facility] TaqPath (TM)  COVID-19 Combo Kit as indicated above under Emergency Use Authorization (EUA) from the FDA. Aptima (R) and TaqPath (TM) test performance  characteristics verified by [employer] in accordance with the FDAs Guidance Document (Policy for Diagnostic Tests for Coronavirus Disease-2019  during the Public Health  Emergency) issued on March 16, 2020. The laboratory is regulated under CLIA as qualified to perform high-complexity testing  Unless otherwise noted, all testing was performed at "[facility] CLIA No. [id number], [place] State Licensee No. [id number]     No results found.      Assessment:  Patient Active Problem List   Diagnosis   • Carpal tunnel syndrome of right wrist         Plan:  The patient voiced understanding of the risks, benefits, and alternative forms of treatment that were discussed and the patient consents to proceed with right carpal tunnel release.     Risks of surgical procedure, possibility of infection, DVT, continued pain, limited strength, neurological deficit, scar tissue formation, recurrence of nerve compression  and further surgical intervention discussed with the patient. Patient understands that surgery will benefit symptoms of pain. Recovery may take several months and may not be complete based on extent of preoperative nerve disease.  Discharge Plan: today to home and home health      Date: 1/15/2021    Osvaldo Strong MD      DICTATED UTILIZING DRAGON DICTATION

## 2021-01-22 ENCOUNTER — OFFICE VISIT (OUTPATIENT)
Dept: ORTHOPEDIC SURGERY | Facility: CLINIC | Age: 31
End: 2021-01-22

## 2021-01-22 VITALS — BODY MASS INDEX: 33.61 KG/M2 | WEIGHT: 178 LBS | TEMPERATURE: 98.2 F | HEIGHT: 61 IN

## 2021-01-22 DIAGNOSIS — Z98.890 S/P CARPAL TUNNEL RELEASE: Primary | ICD-10-CM

## 2021-01-22 PROCEDURE — 99024 POSTOP FOLLOW-UP VISIT: CPT | Performed by: PHYSICIAN ASSISTANT

## 2021-01-22 NOTE — PROGRESS NOTES
OTHER POST OP GLOBAL     NAME: Hanna Matos  ?  : 1990  ?  MRN: [id number]    Chief Complaint   Patient presents with   • Right Wrist - Follow-up, Pain   • Post-op     ?  Date of surgery: 1/15/21    HPI:   Patient returns today for 1 week follow up of right carpal tunnel release. Incision(s) healing nicely/as expected with no signs of infection. Patient reports doing well with no unusual complaints. Appears to be progressing appropriately.  She continues to report some numbness and tingling.  She is completely weaned herself off of pain medication.          Ortho Exam:   Right wrist-carpal tunnel  The patient is 1  week(s) post carpal tunnel release. Incision is clean and healing well.   Appropriate amounts of swelling and bruising are noted.  No evidence of deep seated joint infection is noted.   The patient has well maintained median nerve function.   No evidence of RSD is noted.   Moving 2 point discrimination is slightly prolonged but is starting to revert back to more normal perimeters.   No evidence of ulnar nerve deficit is noted. Capillary refill is 2 seconds with a brisk return.   There is no evidence of neurological dysfunction at this point.      Diagnostic Studies:  no diagnostic testing performed this visit      Assessment:  Diagnoses and all orders for this visit:    1. S/P carpal tunnel release (Primary)          Plan   • Incision care  • Continue ice as needed  • No lifting, pushing or pulling activity   • Alternate Ibuprofen and Tylenol as needed  • Fall precautions  • Follow up in 4-6 week(s)     Date of encounter: 2021  Mejia Ramos PA-C      Electronically signed by Mejia Ramos PA-C, 21, 11:39 AM EST.

## 2021-01-28 ENCOUNTER — TELEPHONE (OUTPATIENT)
Dept: ORTHOPEDIC SURGERY | Facility: CLINIC | Age: 31
End: 2021-01-28

## 2021-01-28 NOTE — TELEPHONE ENCOUNTER
Please have her elevate the hand, application of ice to it and if she can come and see Mejia tomorrow to be evaluated I would appreciate that thank you

## 2021-01-28 NOTE — TELEPHONE ENCOUNTER
LEFT VOICEMAIL FOR PATIENT TO ELEVATE HER HAND AND APPLY ICE PER DR. FRYE'S INSTRUCTIONS AND THAT I HAVE SCHEDULED HER TO SEE ANNIE HANSON PA-C TOMORROW @ 9:45 AM

## 2021-01-28 NOTE — TELEPHONE ENCOUNTER
PATIENT CALLED AND STATED THAT WHEN SHE TOOK HER BRACE OFF THIS MORNING HER HAND IS SO SWOLLEN SHE CAN BARELY MAKE A FIST.  SHE IS WANTING TO KNOW IF THIS IS NORMAL.  PATIENT IS S/P RIGHT CARPAL TUNNEL RELEASE ON 1/15/21.  PLEASE ADVISE

## 2021-01-29 ENCOUNTER — OFFICE VISIT (OUTPATIENT)
Dept: ORTHOPEDIC SURGERY | Facility: CLINIC | Age: 31
End: 2021-01-29

## 2021-01-29 VITALS — HEIGHT: 61 IN | TEMPERATURE: 98.4 F | WEIGHT: 178 LBS | BODY MASS INDEX: 33.61 KG/M2

## 2021-01-29 DIAGNOSIS — Z98.890 S/P CARPAL TUNNEL RELEASE: Primary | ICD-10-CM

## 2021-01-29 PROCEDURE — 99024 POSTOP FOLLOW-UP VISIT: CPT | Performed by: PHYSICIAN ASSISTANT

## 2021-01-29 NOTE — PROGRESS NOTES
OTHER POST OP GLOBAL     NAME: Hanna Matos  ?  : 1990  ?  MRN: [id number]    Chief Complaint   Patient presents with   • Right Hand - Follow-up, Pain     ?  Date of surgery: 1/15/21    HPI:   Patient returns today for 2 week follow up of right carpal tunnel release. Incision(s) healing nicely/as expected with no signs of infection. Patient reports doing well with no unusual complaints. Appears to be progressing appropriately. She reports she has had increased swelling in the last 2 days.       Review of Systems:      Ortho Exam:   Right wrist-carpal tunnel  The patient is 2 weeks post carpal tunnel release. Incision is clean and healing well.   Appropriate amounts of swelling and bruising are noted.  No evidence of deep seated joint infection is noted.   The patient has well maintained median nerve function.   No evidence of RSD is noted.   Moving 2 point discrimination is slightly prolonged but is starting to revert back to more normal perimeters.   No evidence of ulnar nerve deficit is noted. Capillary refill is 2 seconds with a brisk return.   There is no evidence of neurological dysfunction at this point.        Assessment:  Diagnoses and all orders for this visit:    1. S/P carpal tunnel release (Primary)          Plan   • Incision care  • Reassurance regarding swelling. Advised to keep area wrapped and elevated.  • Continue ice as needed  • Gentle active ROM  • No lifting, pushing or pulling activity   • Alternate Ibuprofen and Tylenol as needed  • Fall precautions  • Exercises were shown in office. She does not wish to do therapy.  • Follow up as scheduled     Date of encounter: 2021  Mejia Ramos PA-C    Electronically signed by Mejia Ramos PA-C, 21, 10:15 AM EST.

## 2021-02-25 ENCOUNTER — OFFICE VISIT (OUTPATIENT)
Dept: ORTHOPEDIC SURGERY | Facility: CLINIC | Age: 31
End: 2021-02-25

## 2021-02-25 VITALS — HEIGHT: 62 IN | TEMPERATURE: 97.3 F | BODY MASS INDEX: 32.76 KG/M2 | WEIGHT: 178 LBS

## 2021-02-25 DIAGNOSIS — Z98.890 S/P CARPAL TUNNEL RELEASE: Primary | ICD-10-CM

## 2021-02-25 PROCEDURE — 99024 POSTOP FOLLOW-UP VISIT: CPT | Performed by: ORTHOPAEDIC SURGERY

## 2021-04-13 ENCOUNTER — OFFICE VISIT CONVERTED (OUTPATIENT)
Dept: FAMILY MEDICINE CLINIC | Age: 31
End: 2021-04-13
Attending: FAMILY MEDICINE

## 2021-05-10 ENCOUNTER — HOSPITAL ENCOUNTER (OUTPATIENT)
Dept: OTHER | Facility: HOSPITAL | Age: 31
Discharge: HOME | End: 2021-05-10
Attending: NURSE PRACTITIONER

## 2021-05-10 ENCOUNTER — OFFICE VISIT CONVERTED (OUTPATIENT)
Dept: FAMILY MEDICINE CLINIC | Age: 31
End: 2021-05-10
Attending: NURSE PRACTITIONER

## 2021-05-10 LAB
APPEARANCE UR: ABNORMAL
BACTERIA UR CULT: NORMAL
BACTERIA UR QL AUTO: ABNORMAL
BILIRUB UR QL: NEGATIVE
CASTS URNS QL MICRO: ABNORMAL /[LPF]
COLOR UR: ABNORMAL
CONV LEUKOCYTE ESTERASE: ABNORMAL
CONV UROBILINOGEN IN URINE BY AUTOMATED TEST STRIP: 0.2 {EHRLICHU}/DL (ref 0.1–1)
EPI CELLS #/AREA URNS HPF: ABNORMAL /[HPF]
GLUCOSE 24H UR-MCNC: NEGATIVE MG/DL
HGB UR QL STRIP: ABNORMAL
KETONES UR QL STRIP: NEGATIVE MG/DL
MUCOUS THREADS URNS QL MICRO: ABNORMAL
NITRITE UR-MCNC: NEGATIVE MG/ML
PH UR STRIP.AUTO: 5.5 [PH] (ref 5–8)
PROT UR-MCNC: NEGATIVE MG/DL
RBC # BLD AUTO: ABNORMAL /[HPF]
SP GR UR STRIP: >=1.03 (ref 1–1.03)
SPECIMEN SOURCE: ABNORMAL
UNIDENT CRYS URNS QL MICRO: ABNORMAL /[HPF]
WBC #/AREA URNS HPF: ABNORMAL /[HPF]

## 2021-05-12 LAB — BACTERIA UR CULT: NORMAL

## 2021-05-18 NOTE — PROGRESS NOTES
Hanna Matos  1990     Office/Outpatient Visit    Visit Date: Mon, Aug 31, 2020 11:16 am    Provider: Jimena Olson N.P. (Assistant: Kassidy Sawyer MA)    Location: [facility]        Electronically signed by Jimena Olson N.P. on  2020 01:37:01 PM                             Subjective:        CC: Ms. Matos is a 29 year old White female.  low back pain; pt states she is not taking etodolac LMP 20        HPI:       mid back pain     Patient to be evaluated for low back pain.  The discomfort is most prominent in the left, mid thoracic spine.  She states that the current episode of pain started 2-3 days ago.  Associated symptoms include urinary frequency.  Pain is worse with jarring movements, like when she was hunting and riding in a vehicle and going over a bump.      ROS:     CONSTITUTIONAL:  Negative for fever.      RESPIRATORY:  Negative for recent cough and dyspnea.      GENITOURINARY:  Negative for dysuria and hematuria.      MUSCULOSKELETAL:  Positive for back pain ( chronic ) and right shoulder pain, persist, likes to hunt and can't use bow to hunt.  has been taking lodine, not really helping     NEUROLOGICAL:  Positive for paresthesia ( right arm ).  has an appt with [facility] for [facility]         Past Medical History / Family History / Social History:         Last Reviewed on 2020 11:57 AM by Jimena Olson    Past Medical History:                 PAST MEDICAL HISTORY         Asthma: dx'd at age 18 mos;     Pneumonia: hospitalized;     Urinary Tract Infections, Recurrent: dx'd at age 4; hospitilized;     Chicken pox     Cervical cancer         GYNECOLOGICAL HISTORY:     miscarriage 1    No problems with menstrual cycles.    Contraception: S/P tubal ligation;    Menarche occurred at age 15 yrs.    (+) hx of abnormal Pap ( she has undergone LEEP ) Sexually Active? yes         CURRENT MEDICAL PROVIDERS:    Obstetrician/Gynecologist: Dr SEALS         CURRENT  MEDICAL PROVIDERS:    hosp-- for pylonephritis     miscarrage X1         PREVENTIVE HEALTH MAINTENANCE             PAP SMEAR: was last done  with normal results         Surgical History:         Bilateral Tubal Ligation         Family History:         Paternal Grandfather: Congestive Heart Failure (  );  Type 2 Diabetes     Maternal Grandfather: Lung Cancer (  ) Father:  at age 52; Cause of death was lymphoma    ; Positive for Myocardial Infarction;     Mother: Healthy    ; Positive for ADD/ADHD;     Son(s): Healthy; 2 son(s) total         Social History:     Occupation:. Homemaker     Marital Status: Engaged     Children: 2 children         Tobacco/Alcohol/Supplements:     Last Reviewed on 2020 11:19 AM by Kassidy Sawyer    Tobacco: Current Smoker: She currently smokes every day, 1/2 pack per day; (start age 16 pack-year history).  Non-drinker         Immunizations:     DTaP 1/10/1991    DTaP 1991    DTaP 1991    DTaP 3/25/1992    DTaP 1995    Td adult 2005    Td adult 2002    PedvaxHIB (Hib PRP-OMP) 1991    PedvaxHIB (Hib PRP-OMP) 1991    PedvaxHIB (Hib PRP-OMP) 1991    PedvaxHIB (Hib PRP-OMP) 3/25/1992    Hep B (pedi/adol, 3-dose schedule) 2001    Hep B (pedi/adol, 3-dose schedule) 2002    Hep B (pedi/adol, 3-dose schedule) 2003    zzGardasil 3/7/2008    zzGardasil 2008    zzGardasil 2008    OPV  Poliovirus, live (oral) 1/10/1991    OPV  Poliovirus, live (oral) 1991    OPV  Poliovirus, live (oral) 3/25/1992    OPV  Poliovirus, live (oral) 1995    MMR  (Measles-Mumps-Rubella), live 1992    MMR  (Measles-Mumps-Rubella), live 2001    Fluzone Quadrivalent (3+ years) 2019    Influenza, split virus (3+ years dose) 2002    Menactra (Meningococcal MCV4P) 2002        Allergies:     Last Reviewed on 2020 11:19 AM by Kassidy Sawyer    No Known Allergies.        Current Medications:     Last Reviewed  on 8/31/2020 11:19 AM by Kassidy Sawyer    IBUPROFEN 600MG Tablets  [TAKE 1 TABLET BY MOUTH EVERY 6-8 HOURS AS NEEDED]    etodolac 400 mg oral tablet [take 1 tablet (400 mg) by oral route 2 times per day with food]        Objective:        Vitals:         Current: 8/31/2020 11:22:04 AM    Ht:  5 ft, 2.5 in;  Wt: 175.2 lbs;  BMI: 31.5T: 96.9 F (temporal);  BP: 108/63 mm Hg (left arm, sitting);  P: 70 bpm (left arm (BP Cuff), sitting);  sCr: 0.68 mg/dL;  GFR: 126.25        Exams:     PHYSICAL EXAM:     GENERAL: vital signs recorded - well developed, well nourished;  no apparent distress;     RESPIRATORY: normal respiratory rate and pattern with no distress; normal breath sounds with no rales, rhonchi, wheezes or rubs;     CARDIOVASCULAR: normal rate; rhythm is regular;  no systolic murmur;     MUSCULOSKELETAL: no pain with ROM of back; pain with ROM right shoulder No CVA tenderness     PSYCHIATRIC:  appropriate affect and demeanor; normal speech pattern; grossly normal memory;         Assessment:         M54.5   Low back pain       M54.6   Pain in thoracic spine       M25.511   Pain in right shoulder           ORDERS:         Lab Orders:       [id number]  [facility] Urinalysis, automated, with micro  (Send-Out)              Procedures Ordered:       RFPT  Physical/Occupational Therapy Referral  (Send-Out)                      Plan:         Low back painPT, will send for therapy for her low back, thoracic back and right shoulder pain, will contact her at  [phone] with appt/also completed an exemption form for her for a crossbow permit to hunt        Pain in thoracic spineurine is clear /continue to drink plenty of water        REFERRALS:  Referral initiated to physical therapy ( [facility]; for evaluation of low, mid back pain and right shoulder pain ) for evaluation and treatment.            Orders:       RFPT  Physical/Occupational Therapy Referral  (Send-Out)            [id number]   ([facility])              Pain in right shoulderkeep her 10-1-20 at [facility]            Charge Capture:         Primary Diagnosis:     M54.5  Low back pain           Orders:      [id number]  Office/outpatient visit; established patient, level 3  (In-House)              M54.6  Pain in thoracic spine     M25.511  Pain in right shoulder

## 2021-05-18 NOTE — PROGRESS NOTES
Hanna Matos  1990     Office/Outpatient Visit    Visit Date: Wed, Dec 9, 2020 12:54 pm    Provider: Leidy Farrar N.P. (Assistant: Kelli Olson,  )    Location: [facility]        Electronically signed by Leidy Farrar N.P. on  12/09/2020 01:24:22 PM                             Subjective:        CC: Ms. Matos is a 30 year old White female.  NECK PAIN; pt stopped taking ibuprofen and etodolac because stated neither helped.        HPI:           Ms. Matos presents with cervicalgia.  The location of discomfort is posterior.  It radiates to the right shoulder.  The pain is characterized as stretching.  There was no obvious precipitating event or injury.  Associated symptoms include headache.  Tried heating pad and took one of fiance's muscle relaxers without improvement.          Dx with acute vaginitis; the presenting complaint is vaginal discharge without irritation.  These have been present for the past one week.  Associated symptoms include foul-smelling vaginal odor.  Previous treatment has included Azo, OTC antifungal creams.  Has taken Diflucan for yeast infections in the past with similar symptoms. Declines exam today as she has her children with her.     ROS:     CONSTITUTIONAL:  Negative for chills and fever.      CARDIOVASCULAR:  Negative for chest pain and palpitations.      RESPIRATORY:  Negative for dyspnea and frequent wheezing.      GASTROINTESTINAL:  Negative for abdominal pain and vomiting.      GENITOURINARY:  Positive for vaginal discharge.   Negative for dysuria, vaginal itching or frequent urination.      MUSCULOSKELETAL:  Positive for neck pain.          Past Medical History / Family History / Social History:         Last Reviewed on 8/31/2020 11:57 AM by Jimena Olson    Past Medical History:                 PAST MEDICAL HISTORY         Asthma: dx'd at age 18 mos;     Pneumonia: hospitalized;     Urinary Tract Infections, Recurrent: dx'd at age  4; hospitilized;     Chicken pox     Cervical cancer         GYNECOLOGICAL HISTORY:     miscarriage 1    No problems with menstrual cycles.    Contraception: S/P tubal ligation;    Menarche occurred at age 15 yrs.    (+) hx of abnormal Pap ( she has undergone LEEP ) Sexually Active? yes         CURRENT MEDICAL PROVIDERS:    Obstetrician/Gynecologist: Dr SEALS         CURRENT MEDICAL PROVIDERS:    hosp-- for pylonephritis     miscarrage X1         PREVENTIVE HEALTH MAINTENANCE             PAP SMEAR: was last done  with normal results         Surgical History:         Bilateral Tubal Ligation         Family History:         Paternal Grandfather: Congestive Heart Failure (  );  Type 2 Diabetes     Maternal Grandfather: Lung Cancer (  ) Father:  at age 52; Cause of death was lymphoma    ; Positive for Myocardial Infarction;     Mother: Healthy    ; Positive for ADD/ADHD;     Son(s): Healthy; 2 son(s) total         Social History:     Occupation:. Homemaker     Marital Status: Engaged     Children: 2 children         Tobacco/Alcohol/Supplements:     Last Reviewed on 2020 11:19 AM by Kassidy Sawyer    Tobacco: Current Smoker: She currently smokes every day, 1/2 pack per day; (start age 16 pack-year history).  Non-drinker         Substance Abuse History:     Last Reviewed on 2020 10:45 AM by Kyleigh Schreiber    NEGATIVE         Mental Health History:     Last Reviewed on 2020 10:45 AM by Kyleigh Schreiber        Communicable Diseases (eg STDs):     Last Reviewed on 2020 10:45 AM by Kyleigh Schreiber        Current Problems:     Last Reviewed on 2020 10:45 AM by Kyleigh Schreiber    Other mixed anxiety disorders    Migraine with aura, not intractable, without status migrainosus    Nicotine dependence, unspecified, uncomplicated    Amenorrhea, unspecified    Localized swelling, mass and lump, head    Encounter for screening for depression    Noninflammatory disorder  of vagina, unspecified    Pain in thoracic spine    Pain in right shoulder    Low back pain        Immunizations:     DTaP 1/10/1991    DTaP 5/9/1991    DTaP 7/11/1991    DTaP 3/25/1992    DTaP 5/1/1995    Td adult 2/27/2005    Td adult 8/6/2002    PedvaxHIB (Hib PRP-OMP) 5/9/1991    PedvaxHIB (Hib PRP-OMP) 7/11/1991    PedvaxHIB (Hib PRP-OMP) 9/11/1991    PedvaxHIB (Hib PRP-OMP) 3/25/1992    Hep B (pedi/adol, 3-dose schedule) 8/29/2001    Hep B (pedi/adol, 3-dose schedule) 5/17/2002    Hep B (pedi/adol, 3-dose schedule) 1/9/2003    zzGardasil 3/7/2008    zzGardasil 5/1/2008    zzGardasil 9/5/2008    OPV  Poliovirus, live (oral) 1/10/1991    OPV  Poliovirus, live (oral) 5/9/1991    OPV  Poliovirus, live (oral) 3/25/1992    OPV  Poliovirus, live (oral) 5/1/1995    MMR  (Measles-Mumps-Rubella), live 5/25/1992    MMR  (Measles-Mumps-Rubella), live 8/29/2001    Fluzone Quadrivalent (3+ years) 2/1/2019    Influenza, split virus (3+ years dose) 11/22/2002    Menactra (Meningococcal MCV4P) 9/13/2002        Allergies:     Last Reviewed on 12/09/2020 12:56 PM by Kelli Olson    No Known Allergies.        Current Medications:     Last Reviewed on 12/09/2020 12:56 PM by Kelli Olson    IBUPROFEN 600MG Tablets  [TAKE 1 TABLET BY MOUTH EVERY 6-8 HOURS AS NEEDED]    etodolac 400 mg oral tablet [take 1 tablet (400 mg) by oral route 2 times per day with food]        Objective:        Vitals:         Current: 12/9/2020 1:00:22 PM    Ht:  5 ft, 2.5 in;  Wt: 177.6 lbs;  BMI: 32.0T: 97.5 F (temporal);  BP: 102/52 mm Hg (left arm, sitting);  P: 98 bpm (left arm (BP Cuff), sitting);  sCr: 0.68 mg/dL;  GFR: 125.87        Exams:     PHYSICAL EXAM:     GENERAL: well developed, well nourished;  no apparent distress;     NECK: range of motion is normal; trachea is midline; pain when turning head to right;     RESPIRATORY: normal respiratory rate and pattern with no distress; normal breath sounds with no rales, rhonchi, wheezes or  rubs;     CARDIOVASCULAR: normal rate; rhythm is regular;     GENITOURINARY: Declines exam today as she has her children with her;     NEUROLOGIC: mental status: alert and oriented x 3; GROSSLY INTACT     PSYCHIATRIC: appropriate affect and demeanor;         Assessment:         M54.2   Cervicalgia       N76.0   Acute vaginitis           ORDERS:         Meds Prescribed:       [New Rx] predniSONE 10 mg oral tablet [take 6 tablets by mouth on day 1,  5 tablets on day 2,  4 tablets on day 3,  3 tablets on day 4, 2 tablets on day 5,  1 tablet on day 6], #21 (twenty one) tablets, Refills: 0 (zero)       [New Rx] Diflucan 150 mg oral tablet [take 1 tablet (150 mg) by oral route once. May repeat in 3 days if needed.], #2 (two) tablets, Refills: 0 (zero)       [New Rx] cyclobenzaprine 10 mg oral tablet [take 1 tablet (10 mg) by oral route every 8 hours as needed. May cause drowsiness.], #30 (thirty) tablets, Refills: 0 (zero)                 Plan:         CervicalgiaDeclines steroid or anti-inflammatory injection in office today. F/U persistent or worsening symptoms.        RECOMMENDATIONS given include: cold packs, moist heat, and massage.            Prescriptions:       [New Rx] predniSONE 10 mg oral tablet [take 6 tablets by mouth on day 1,  5 tablets on day 2,  4 tablets on day 3,  3 tablets on day 4, 2 tablets on day 5,  1 tablet on day 6], #21 (twenty one) tablets, Refills: 0 (zero)       [New Rx] cyclobenzaprine 10 mg oral tablet [take 1 tablet (10 mg) by oral route every 8 hours as needed. May cause drowsiness.], #30 (thirty) tablets, Refills: 0 (zero)         Acute vaginitisWill treat empirically for yeast. F/U for exam if persistent or worsening symptoms.           Prescriptions:       [New Rx] Diflucan 150 mg oral tablet [take 1 tablet (150 mg) by oral route once. May repeat in 3 days if needed.], #2 (two) tablets, Refills: 0 (zero)             Patient Recommendations:        For  Cervicalgia:    Try cold packs on  the tight, painful areas in the neck. Heat applied to the neck may help relieve pain and stiffness (i.e. hot tub, shower, heating pad, hot water bottle). Massage the tight muscles in the back of the neck to help relieve the pain.              Charge Capture:         Primary Diagnosis:     M54.2  Cervicalgia           Orders:      [id number]  Office/outpatient visit; established patient, level 4  (In-House)              N76.0  Acute vaginitis

## 2021-05-18 NOTE — PROGRESS NOTES
Hanna Matos  1990     Office/Outpatient Visit    Visit Date:  01:16 pm    Provider: Zainab Rich MD (Assistant: Monica Chapman MA)    Location: [facility]        Electronically signed by Zainab Rich MD on  2021 01:41:29 PM                             Subjective:        CC: Ms. Matos is a 30 year old White female.  Patient presents today with complaints of headache and congestion X 1 week;         HPI: Hanna is here today with acute complaint of headache and congestion for the past 1 week.  Sx started with a sore throat. +Fatigue and malaise, no fevers or chills.  +Headaches.  +Rhinorrhea (yellow), congestion.  +Sore throat.  +Cough, minimal production.  No CP or SOB.  No abd pain, N/V/D.  No body aches.  Sick contacts: no known COVID exposures.  She is not yet vaccinated.  She has had recurrent sinus infections in the past and this feels very similar.    ROS:     CONSTITUTIONAL:  Positive for fatigue and malaise.   Negative for chills or fever.      EYES:  Negative for blurred vision.      E/N/T:  Positive for nasal congestion, frequent rhinorrhea and sore throat.   Negative for diminished hearing.      CARDIOVASCULAR:  Negative for chest pain and palpitations.      RESPIRATORY:  Positive for recent cough.   Negative for dyspnea.      GASTROINTESTINAL:  Negative for abdominal pain, diarrhea, nausea and vomiting.      MUSCULOSKELETAL:  Negative for myalgias.          Past Medical History / Family History / Social History:         Last Reviewed on 2021 01:33 PM by Zainab Rich    Past Medical History:                 PAST MEDICAL HISTORY         Asthma: dx'd at age 18 mos;     Pneumonia: hospitalized;     Urinary Tract Infections, Recurrent: dx'd at age 4; hospitilized;     Chicken pox     Cervical cancer         GYNECOLOGICAL HISTORY:     miscarriage 1    No problems with menstrual cycles.    Contraception: S/P tubal ligation;     Menarche occurred at age 15 yrs.    (+) hx of abnormal Pap ( she has undergone LEEP ) Sexually Active? yes         CURRENT MEDICAL PROVIDERS:    Obstetrician/Gynecologist: Dr SEALS         CURRENT MEDICAL PROVIDERS:    hosp-- for pylonephritis     miscarrage X1         PREVENTIVE HEALTH MAINTENANCE             PAP SMEAR: was last done  with normal results         Surgical History:         Bilateral Tubal Ligation         Family History:         Paternal Grandfather: Congestive Heart Failure (  );  Type 2 Diabetes     Maternal Grandfather: Lung Cancer (  ) Father:  at age 52; Cause of death was lymphoma    ; Positive for Myocardial Infarction;     Mother: Healthy    ; Positive for ADD/ADHD;     Son(s): Healthy; 2 son(s) total         Social History:     Occupation:. Homemaker     Marital Status: Engaged     Children: 2 children         Tobacco/Alcohol/Supplements:     Last Reviewed on 2021 01:33 PM by Zainab Rich    Tobacco: Current Smoker: She currently smokes every day, 1/2 pack per day; (start age 16 pack-year history).  Non-drinker         Substance Abuse History:     Last Reviewed on 2021 01:33 PM by Zainab Rich    NEGATIVE         Mental Health History:     Last Reviewed on 2021 01:33 PM by Zainab Rich        Communicable Diseases (eg STDs):     Last Reviewed on 2021 01:33 PM by Zainab Rich        Current Problems:     Last Reviewed on 2020 10:45 AM by Kyleigh Schreiber    Other mixed anxiety disorders    Migraine with aura, not intractable, without status migrainosus    Nicotine dependence, unspecified, uncomplicated    Amenorrhea, unspecified    Localized swelling, mass and lump, head    Encounter for screening for depression    Noninflammatory disorder of vagina, unspecified    Pain in thoracic spine    Pain in right shoulder    Low back pain    Acute vaginitis    Cervicalgia        Immunizations:     DTaP 1/10/1991    DTaP 1991    DTaP  7/11/1991    DTaP 3/25/1992    DTaP 5/1/1995    Td adult 2/27/2005    Td adult 8/6/2002    PedvaxHIB (Hib PRP-OMP) 5/9/1991    PedvaxHIB (Hib PRP-OMP) 7/11/1991    PedvaxHIB (Hib PRP-OMP) 9/11/1991    PedvaxHIB (Hib PRP-OMP) 3/25/1992    Hep B (pedi/adol, 3-dose schedule) 8/29/2001    Hep B (pedi/adol, 3-dose schedule) 5/17/2002    Hep B (pedi/adol, 3-dose schedule) 1/9/2003    zzGardasil 3/7/2008    zzGardasil 5/1/2008    zzGardasil 9/5/2008    OPV  Poliovirus, live (oral) 1/10/1991    OPV  Poliovirus, live (oral) 5/9/1991    OPV  Poliovirus, live (oral) 3/25/1992    OPV  Poliovirus, live (oral) 5/1/1995    MMR  (Measles-Mumps-Rubella), live 5/25/1992    MMR  (Measles-Mumps-Rubella), live 8/29/2001    Fluzone Quadrivalent (3+ years) 2/1/2019    Influenza, split virus (3+ years dose) 11/22/2002    Menactra (Meningococcal MCV4P) 9/13/2002        Allergies:     Last Reviewed on 4/13/2021 01:25 PM by Monica Chapman    No Known Allergies.        Current Medications:     Last Reviewed on 4/13/2021 01:26 PM by Monica Chapman    IBUPROFEN 600MG Tablets  [TAKE 1 TABLET BY MOUTH EVERY 6-8 HOURS AS NEEDED]    cyclobenzaprine 10 mg oral tablet [take 1 tablet (10 mg) by oral route every 8 hours as needed. May cause drowsiness.]        Objective:        Vitals:         Current: 4/13/2021 1:27:11 PM    Ht:  5 ft, 2.5 in;  Wt: 177 lbs;  BMI: 31.9T: 97.2 F (temporal);  BP: 106/63 mm Hg (left arm, sitting);  P: 72 bpm (left arm (BP Cuff), sitting);  sCr: 0.68 mg/dL;  GFR: 125.69        Exams:     PHYSICAL EXAM:     GENERAL: vital signs recorded - well developed, well nourished;  well groomed;  no apparent distress;     EYES: extraocular movements intact; conjunctiva and cornea are normal; PERRLA;     E/N/T: OROPHARYNX:  normal mucosa, dentition, gingiva, and posterior pharynx;     RESPIRATORY: normal respiratory rate and pattern with no distress; normal breath sounds with no rales, rhonchi, wheezes or rubs;     CARDIOVASCULAR:  normal rate; rhythm is regular;  no systolic murmur; no edema;     GASTROINTESTINAL: nontender; normal bowel sounds;     LYMPHATIC: no enlargement of cervical or facial nodes;     MUSCULOSKELETAL: normal gait; normal overall tone         Assessment:         J01.00   Acute maxillary sinusitis, unspecified           ORDERS:         Meds Prescribed:       [New Rx] Augmentin 875-125 mg oral tablet [take 1 tablet by oral route every 12 hours for 10 days], #20 (twenty) tablets, Refills: 0 (zero)       [New Rx] fluconazole 150 mg oral tablet [take 1 tablet (150 mg) PO x 1, may repeat x 1 in 3 days if sx do not resolve], #2 (two) tablets, Refills: 0 (zero)                 Plan:         Acute maxillary sinusitis, unspecifiedTreating due to sx duration with Augmentin as below.  Symptomatic care recommended with OTC analgesics for pain/fever, OTC decongestants and cough suppressants prn.  Stay well hydrated.  Humidifier in the bedroom at night.  Hot tea with lemon and honey.  RTC prn worsening or failure to improve of sx.  Fluconazole sent as she typically gets yeast infections with abx.          Prescriptions:       [New Rx] Augmentin 875-125 mg oral tablet [take 1 tablet by oral route every 12 hours for 10 days], #20 (twenty) tablets, Refills: 0 (zero)       [New Rx] fluconazole 150 mg oral tablet [take 1 tablet (150 mg) PO x 1, may repeat x 1 in 3 days if sx do not resolve], #2 (two) tablets, Refills: 0 (zero)             Charge Capture:         Primary Diagnosis:     J01.00  Acute maxillary sinusitis, unspecified           Orders:      [id number]  Office/outpatient visit; established patient, level 3  (In-House)

## 2021-05-18 NOTE — PROGRESS NOTES
Hanna Matos 1990     Office/Outpatient Visit    Visit Date: Mon, Jan 21, 2019 04:10 pm    Provider: Jimena Olson N.P. (Assistant: Franklyn Manning)    Location: [facility]        Electronically signed by Jimena Olson N.P. on  01/21/2019 08:37:15 PM                             SUBJECTIVE:        CC:     Ms. Matos is a 28 year old White female.  physical, needs some papers filled out, also wants to get back on adhd meds;         HPI:         Health checkup noted.  She cannot recall when she last had a physical exam.  Her last menstrual period was 12/11/18.  Her current method of contraception is a tubal ligation.  She does not perform breast self-exams.    Her last Pap smear was <6 months ago and was normal.   She's had vision screening done 1 year ago.   Preventative Health updated today.  She is not current with her influenza immunization.  Ms. Matos has had an abnormal Pap smear in the past. pre cancerous cells Tobacco: Current Smoker: She currently smokes every day, 1/2 pack per day.          PHQ-9 Depression Screening: Completed form scanned and in chart; Total Score 5 Alcohol Consumption Screening: Completed form scanned and in chart; Total Score 3     ROS:     CONSTITUTIONAL:  Negative for chills, fatigue, fever, and weight change.      EYES:  Negative for blurred vision, eye pain, and photophobia.      E/N/T:  Negative for hearing problems, E/N/T pain, congestion, rhinorrhea, epistaxis, hoarseness, and dental problems.      CARDIOVASCULAR:  Negative for chest pain, palpitations, tachycardia, orthopnea, and edema.      RESPIRATORY:  Negative for cough, dyspnea, and hemoptysis.      GASTROINTESTINAL:  Negative for abdominal pain, heartburn, constipation, diarrhea, and stool changes.      GENITOURINARY:  Positive for irregular menstrual cycle (late this month).      MUSCULOSKELETAL:  Negative for arthralgias, back pain, and myalgias.      INTEGUMENTARY/BREAST:  Negative for  atypical moles, dry skin, pruritis, rashes, breast masses, and nipple discharge.      NEUROLOGICAL:  Negative for dizziness, headaches, paresthesias, and weakness.      ENDOCRINE:  Negative for hair loss, heat/cold intolerance, polydipsia, and polyphagia.      PSYCHIATRIC:  Positive for crying spells, feelings of stress ( (boyfriend with car issues/wreck) ) and recently got a dog.   Negative for suicidal thoughts.  son that is 7 with issues, she was on SSRI in the past (has a 3 year old too) boy friend is not the father of either boys (she has had a dog about a month and is better) border collie /mix         PMH/FMH/SH:     Last Reviewed on 2019 04:22 PM by Jimena Olson    Past Medical History:                 PAST MEDICAL HISTORY         Asthma: dx'd at age 18 mos;     Pneumonia: hospitalized;     Urinary Tract Infections, Recurrent: dx'd at age 4; hospitilized;     Chicken pox     Cervical cancer         GYNECOLOGICAL HISTORY:     miscarriage 1    No problems with menstrual cycles.    Contraception: S/P tubal ligation;    Menarche occurred at age 15 yrs.    Last Pap was Last Pap was 3/2017; (+) hx of abnormal Pap Sexually Active? yes         CURRENT MEDICAL PROVIDERS:    Obstetrician/Gynecologist: [facility]         CURRENT MEDICAL PROVIDERS:    hosp-- for pylonephritis     miscarrage X1         PREVENTIVE HEALTH MAINTENANCE             PAP SMEAR: was last done 3/2017 with normal results         Surgical History:         Bilateral Tubal Ligation         Family History:         Paternal Grandfather: Congestive Heart Failure (  );  Type 2 Diabetes     Maternal Grandfather: Lung Cancer (  ) Father: Healthy     Mother: Healthy     Son(s): Healthy; 1 son(s) total         Social History:     Occupation:. Homemaker     Marital Status: Single     Children: 2 children         Tobacco/Alcohol/Supplements:     Last Reviewed on 2019 04:13 PM by Franklyn Manning    Tobacco: Current  Smoker: She currently smokes every day, 1/2 pack per day; (start age 16 pack-year history).  Non-drinker             Immunizations:     DTaP 1/10/1991     DTaP 5/9/1991     DTaP 7/11/1991     DTaP 3/25/1992     DTaP 5/1/1995     Td adult 2/27/2005     Td adult 8/6/2002     PedvaxHIB (Hib PRP-OMP) 5/9/1991     PedvaxHIB (Hib PRP-OMP) 7/11/1991     PedvaxHIB (Hib PRP-OMP) 9/11/1991     PedvaxHIB (Hib PRP-OMP) 3/25/1992     Hep B (pedi/adol, 3-dose schedule) 8/29/2001     Hep B (pedi/adol, 3-dose schedule) 5/17/2002     Hep B (pedi/adol, 3-dose schedule) 1/9/2003     zzGardasil 3/7/2008     zzGardasil 5/1/2008     zzGardasil 9/5/2008     OPV  Poliovirus, live (oral) 1/10/1991     OPV  Poliovirus, live (oral) 5/9/1991     OPV  Poliovirus, live (oral) 3/25/1992     OPV  Poliovirus, live (oral) 5/1/1995     MMR  (Measles-Mumps-Rubella), live 5/25/1992     MMR  (Measles-Mumps-Rubella), live 8/29/2001     Influenza, split virus (3+ years dose) 11/22/2002     Menactra (Meningococcal MCV4P) 9/13/2002         Allergies:     Last Reviewed on 1/21/2019 04:13 PM by Franklyn Manning      No Known Drug Allergies.         Current Medications:     Last Reviewed on 1/21/2019 04:13 PM by Franklyn Manning    Albuterol 90mcg/1actuation Oral Inhaler 1-2 puffs q 4-6 hrs PRN         OBJECTIVE:        Vitals:         Current: 1/21/2019 4:16:27 PM    Ht:  5 ft, 2.5 in;  Wt: 156.4 lbs;  BMI: 28.2    T: 98.4 F (oral);  BP: 117/62 mm Hg (left arm, sitting);  P: 86 bpm (left arm (BP Cuff), sitting);  sCr: 0.68 mg/dL;  GFR: 121.37        Exams:     PHYSICAL EXAM:     GENERAL: vital signs recorded - well developed, well nourished;  no apparent distress;     EYES: extraocular movements intact; conjunctiva and cornea are normal; PERRLA;     E/N/T:  normal EACs, TMs, nasal/oral mucosa, teeth, gingiva, and oropharynx;     NECK: range of motion is normal; thyroid is non-palpable;     RESPIRATORY: normal respiratory rate and pattern with no distress;  normal breath sounds with no rales, rhonchi, wheezes or rubs;     CARDIOVASCULAR: normal rate; rhythm is regular;  no systolic murmur; no edema;     GASTROINTESTINAL: nontender; normal bowel sounds; no organomegaly;     LYMPHATIC: no enlargement of cervical or facial nodes;     BREAST/INTEGUMENT: SKIN: no significant rashes or lesions; no suspicious moles;     MUSCULOSKELETAL:  Normal range of motion, strength and tone;     NEUROLOGIC: GROSSLY INTACT     PSYCHIATRIC: affect/demeanor: anxious;         Lab/Test Results:             Pregnancy Test, Urine:  negative (01/21/2019),     Performed by::  and (01/21/2019),             ASSESSMENT           V70.0   Z00.00  Health checkup              DDx:     626.0   N91.2  Amenorrhea              DDx:     V79.0   Z13.89  Screening for depression              DDx:     300.02   F41.3  Anxiety              DDx:         ORDERS:         Meds Prescribed:       Lexapro (Escitalopram Oxalate) 10mg Tablet 1 tab daily  #30 (Thirty) tablet(s) Refills: 1         Lab Orders:       [id number]  Urine pregnancy test by color comparison  (In-House)           Other Orders:       4004F  Pt scrnd tobacco use rcvd tobacco cessation talk  (In-House)                   PLAN:          Health checkup     MIPS Smoking cessation encouraged. Counseling for less than 3 minutes.      COUNSELING was provided today regarding the following topics: healthy eating habits, regular exercise, breast self-exam, limitation of alcohol intake, and use of seat belts.      FOLLOW-UP: Schedule follow-up appointments on a p.r.n. basis.            Orders:       4004F  Pt scrnd tobacco use rcvd tobacco cessation talk  (In-House)            Amenorrhea pregnancy test negative           Orders:       09231  Urine pregnancy test by color comparison  (In-House)            Screening for depression     MIPS PHQ-9 Depression Screening Completed form scanned and in chart; Total Score 5          Anxiety discussed alcohol intake and  reduction, trial of lexparo, she would benefit from having a pet         RECOMMENDATIONS given include: stress reduction.      FOLLOW-UP: Advised to call if there is no improvement in 3-4 week(s).            Prescriptions:       Lexapro (Escitalopram Oxalate) 10mg Tablet 1 tab daily  #30 (Thirty) tablet(s) Refills: 1           Patient Education Handouts:       Generalized Anxiety Disorder              Patient Recommendations:        For  Health checkup:         Limit dietary intake of fat (especially saturated fat) and cholesterol.  Eat a variety of foods, including plenty of fruits, vegetables, and grain containg fiber, limit fat intake to 30% of total calories. Balance caloric intake with energy expended.    Maintaining regular physical activity is advised to help prevent heart disease, hypertension, diabetes, and obesity.    You should regularly examine your breasts, easily done while in the shower or with lotion.  Feel and look for differences in consistency from month to month, especially noting knots or lumps, changes in skin appearance, nipple retraction or discharge.    Always use shoulder/lap restraints when driving or riding in a vehicle, even those equipped with air bags.  Schedule follow-up appointments as needed.          For  Anxiety:     Try stress reduction methods to reduce the frequency or lessen the severity of anxiety episodes.              CHARGE CAPTURE           **Please note: ICD descriptions below are intended for billing purposes only and may not represent clinical diagnoses**        Primary Diagnosis:         V70.0 Health checkup            Z00.00    Encounter for general adult medical examination without abnormal findings              Orders:          [id number]   Preventive medicine, established patient, age 18-39 years  (In-House)             4004F   Pt scrnd tobacco use rcvd tobacco cessation talk  (In-House)           626.0 Amenorrhea            N91.2    Amenorrhea, unspecified               Orders:          [id number]   Urine pregnancy test by color comparison  (In-House)           V79.0 Screening for depression            Z13.89    Encounter for screening for other disorder    300.02 Anxiety            F41.3    Other mixed anxiety disorders

## 2021-05-18 NOTE — PROGRESS NOTES
Hanna Matos  1990     Office/Outpatient Visit    Visit Date:  08:36 am    Provider: Kyleigh Schreiber N.P. (Assistant: Paige Kumar MA)    Location: [facility]        Electronically signed by Kyleigh Schreiber N.P. on  2020 10:47:56 AM                             Subjective:        CC: Ms. Matos is a 29 year old White female.  presents today due to vaginal dryness         HPI:           PHQ-9 Depression Screening: Completed form scanned and in chart; Total Score 7       Has had vaginal dryness and pain with intercourse for over one year.  Has tried over-the-counter lubricants and condoms but gets a yeast infection afterwards.  She has been to the gynecologists within the last 6 months without any new recommendations.  LEEP procedure twice in the last 3 years.    ROS:     CONSTITUTIONAL:  Negative for chills, fatigue, fever, and weight change.      CARDIOVASCULAR:  Negative for chest pain, orthopnea, paroxysmal nocturnal dyspnea and pedal edema.      RESPIRATORY:  Negative for dyspnea.      GASTROINTESTINAL:  Negative for abdominal pain, constipation, diarrhea, nausea and vomiting.      GENITOURINARY:  Positive for Vaginal dryness, worse with intercourse , has tried OTC jellys and cremes and nothing has helped.   Negative for urinary incontinence, vaginal discharge or vaginal itching.      PSYCHIATRIC:  Negative for anxiety, depression, and sleep disturbances.          Past Medical History / Family History / Social History:         Last Reviewed on 2020 10:45 AM by Kyleigh Schreiber    Past Medical History:                 PAST MEDICAL HISTORY         Asthma: dx'd at age 18 mos;     Pneumonia: hospitalized;     Urinary Tract Infections, Recurrent: dx'd at age 4; hospitilized;     Chicken pox     Cervical cancer         GYNECOLOGICAL HISTORY:     miscarriage 1    No problems with menstrual cycles.    Contraception: S/P tubal ligation;     Menarche occurred at age 15 yrs.    (+) hx of abnormal Pap ( she has undergone LEEP ) Sexually Active? yes         CURRENT MEDICAL PROVIDERS:    Obstetrician/Gynecologist: Dr SEALS         CURRENT MEDICAL PROVIDERS:    hosp-- for pylonephritis     miscarrage X1         PREVENTIVE HEALTH MAINTENANCE             PAP SMEAR: was last done  with normal results         Surgical History:         Bilateral Tubal Ligation         Family History:         Paternal Grandfather: Congestive Heart Failure (  );  Type 2 Diabetes     Maternal Grandfather: Lung Cancer (  ) Father:  at age 52; Cause of death was lymphoma    ; Positive for Myocardial Infarction;     Mother: Healthy    ; Positive for ADD/ADHD;     Son(s): Healthy; 2 son(s) total         Social History:     Occupation:. Homemaker     Marital Status: Engaged     Children: 2 children         Tobacco/Alcohol/Supplements:     Last Reviewed on 2020 10:45 AM by Kyleigh Schreiber    Tobacco: Current Smoker: She currently smokes every day, 1/2 pack per day; (start age 16 pack-year history).  Non-drinker         Current Problems:     Last Reviewed on 2020 10:45 AM by Kyleigh Schreiber    Other mixed anxiety disorders    Migraine with aura, not intractable, without status migrainosus    Nicotine dependence, unspecified, uncomplicated    Amenorrhea, unspecified    Localized swelling, mass and lump, head    Encounter for screening for depression    Noninflammatory disorder of vagina, unspecified        Immunizations:     DTaP 1/10/1991    DTaP 1991    DTaP 1991    DTaP 3/25/1992    DTaP 1995    Td adult 2005    Td adult 2002    PedvaxHIB (Hib PRP-OMP) 1991    PedvaxHIB (Hib PRP-OMP) 1991    PedvaxHIB (Hib PRP-OMP) 1991    PedvaxHIB (Hib PRP-OMP) 3/25/1992    Hep B (pedi/adol, 3-dose schedule) 2001    Hep B (pedi/adol, 3-dose schedule) 2002    Hep B (pedi/adol, 3-dose schedule) 2003    Yesenia  3/7/2008    zzGardasil 5/1/2008    zzGardasil 9/5/2008    OPV  Poliovirus, live (oral) 1/10/1991    OPV  Poliovirus, live (oral) 5/9/1991    OPV  Poliovirus, live (oral) 3/25/1992    OPV  Poliovirus, live (oral) 5/1/1995    MMR  (Measles-Mumps-Rubella), live 5/25/1992    MMR  (Measles-Mumps-Rubella), live 8/29/2001    Fluzone Quadrivalent (3+ years) 2/1/2019    Influenza, split virus (3+ years dose) 11/22/2002    Menactra (Meningococcal MCV4P) 9/13/2002        Allergies:     Last Reviewed on 2/05/2020 10:45 AM by Kyleigh Schreiber    No Known Allergies.        Current Medications:     Last Reviewed on 2/05/2020 10:45 AM by Kyleigh Schreiber    IBUPROFEN 600MG Tablets  [TAKE 1 TABLET BY MOUTH EVERY 6-8 HOURS AS NEEDED]    ONDANSETRON 8MG Tablets  [TAKE 1 TABLET (8 MG) BY MOUTH 1 HOUR PRIOR TO SURGERY]        Objective:        Vitals:         Current: 2/5/2020 8:41:54 AM    Ht:  5 ft, 2.5 in;  Wt: 168 lbs;  BMI: 30.2T: 98.1 F (oral);  BP: 107/57 mm Hg (left arm, sitting);  P: 87 bpm (left arm (BP Cuff), sitting);  sCr: 0.68 mg/dL;  GFR: 124.02        Exams:     PHYSICAL EXAM:     GENERAL: vital signs recorded - well developed, well nourished;  no apparent distress;     RESPIRATORY: normal respiratory rate and pattern with no distress; normal breath sounds with no rales, rhonchi, wheezes or rubs;     CARDIOVASCULAR: normal rate; rhythm is regular;  no systolic murmur; no edema;     GASTROINTESTINAL: nontender; normal bowel sounds; no organomegaly;     GENITOURINARY: vagina: normal with good pelvic support and no lesions or discharge;     NEUROLOGIC: GROSSLY INTACT     PSYCHIATRIC:  appropriate affect and demeanor; normal speech pattern; grossly normal memory;         Assessment:         Z13.31   Encounter for screening for depression       N89.9   Noninflammatory disorder of vagina, unspecified           ORDERS:         Other Orders:         Depression screen negative  (In-House)                      Plan:          Encounter for screening for depression    MIPS PHQ-9 Depression Screening: Completed form scanned and in chart; Total Score 7; Positive Depression Screen but after further evaluation the patient does not have a diagnosis of depression.  Stressed preparing for Wedding. dmt          Orders:         Depression screen negative  (In-House)              Noninflammatory disorder of vagina, unspecifiedDiscussed trying OTC astroglide or Coconut oil to help with moisture during sexual intercourse. dmt            Charge Capture:         Primary Diagnosis:     Z13.31  Encounter for screening for depression           Orders:      [id number]  Office/outpatient visit; established patient, level 3  (In-House)              Depression screen negative  (In-House)              N89.9  Noninflammatory disorder of vagina, unspecified

## 2021-05-18 NOTE — PROGRESS NOTES
Hanna Matos  1990     Office/Outpatient Visit    Visit Date: Mon, Aug 10, 2020 01:34 pm    Provider: Jimena Olson N.P. (Assistant: Kelli Mariscal MA)    Location: [facility]        Electronically signed by Jimena Olson N.P. on  08/10/2020 02:01:02 PM                             Subjective:        CC: Ms. Matos is a 29 year old White female.  Right shoulder pain, and lumbar pain.;         HPI:       right shoulder pain     The symptom began >1 years ago.  Aggravating factors include lifting any weight.  Associated symptoms include numbness in right hand intermittently.  Prior work-up has included none.  did do strenuous work in the past/ right handed / did try icy hot     ROS:     CONSTITUTIONAL:  Negative for fever.      CARDIOVASCULAR:  Negative for chest pain, palpitations, tachycardia, orthopnea, and edema.      RESPIRATORY:  Negative for recent cough and dyspnea.      GASTROINTESTINAL:  Negative for change in bowels.      GENITOURINARY:  Negative for change in bladder habits.      MUSCULOSKELETAL:  Positive for back pain ( chronic; since her last child who is nearly 5 years old ).      NEUROLOGICAL:  Positive for has had radicular left leg pain.   Negative for paresthesia ( left lower extremity ).          Past Medical History / Family History / Social History:         Last Reviewed on 8/10/2020 01:51 PM by Jimena Olson    Past Medical History:                 PAST MEDICAL HISTORY         Asthma: dx'd at age 18 mos;     Pneumonia: hospitalized;     Urinary Tract Infections, Recurrent: dx'd at age 4; hospitilized;     Chicken pox     Cervical cancer         GYNECOLOGICAL HISTORY:     miscarriage 1    No problems with menstrual cycles.    Contraception: S/P tubal ligation;    Menarche occurred at age 15 yrs.    (+) hx of abnormal Pap ( she has undergone LEEP ) Sexually Active? yes         CURRENT MEDICAL PROVIDERS:    Obstetrician/Gynecologist: Dr SEALS          CURRENT MEDICAL PROVIDERS:    hosp--4/09 for pylonephritis     miscarrage X1         PREVENTIVE HEALTH MAINTENANCE             PAP SMEAR: was last done 2018 with normal results         Surgical History:         Bilateral Tubal Ligation         Family History:     Reviewed.         Social History:     Occupation:. Homemaker     Marital Status: Engaged     Children: 2 children         Immunizations:     DTaP 1/10/1991    DTaP 5/9/1991    DTaP 7/11/1991    DTaP 3/25/1992    DTaP 5/1/1995    Td adult 2/27/2005    Td adult 8/6/2002    PedvaxHIB (Hib PRP-OMP) 5/9/1991    PedvaxHIB (Hib PRP-OMP) 7/11/1991    PedvaxHIB (Hib PRP-OMP) 9/11/1991    PedvaxHIB (Hib PRP-OMP) 3/25/1992    Hep B (pedi/adol, 3-dose schedule) 8/29/2001    Hep B (pedi/adol, 3-dose schedule) 5/17/2002    Hep B (pedi/adol, 3-dose schedule) 1/9/2003    zzGardasil 3/7/2008    zzGardasil 5/1/2008    zzGardasil 9/5/2008    OPV  Poliovirus, live (oral) 1/10/1991    OPV  Poliovirus, live (oral) 5/9/1991    OPV  Poliovirus, live (oral) 3/25/1992    OPV  Poliovirus, live (oral) 5/1/1995    MMR  (Measles-Mumps-Rubella), live 5/25/1992    MMR  (Measles-Mumps-Rubella), live 8/29/2001    Fluzone Quadrivalent (3+ years) 2/1/2019    Influenza, split virus (3+ years dose) 11/22/2002    Menactra (Meningococcal MCV4P) 9/13/2002        Allergies:     Last Reviewed on 8/10/2020 01:39 PM by Kelli Mariscal    No Known Allergies.        Current Medications:     Last Reviewed on 8/10/2020 01:39 PM by Kelli Mariscal    IBUPROFEN 600MG Tablets  [TAKE 1 TABLET BY MOUTH EVERY 6-8 HOURS AS NEEDED]        Objective:        Vitals:         Current: 8/10/2020 1:41:13 PM    Ht:  5 ft, 2.5 in;  Wt: 171.2 lbs;  BMI: 30.8T: 97.6 F (temporal);  BP: 109/66 mm Hg (left arm, sitting);  P: 81 bpm (left arm (BP Cuff), sitting);  sCr: 0.68 mg/dL;  GFR: 125.02        Exams:     PHYSICAL EXAM:     GENERAL: vital signs recorded - well developed, well nourished;  no apparent distress;      RESPIRATORY: normal respiratory rate and pattern with no distress; normal breath sounds with no rales, rhonchi, wheezes or rubs;     CARDIOVASCULAR: normal rate; rhythm is regular;  no systolic murmur;     MUSCULOSKELETAL: pain with range of motion in: right shoulder;  no pain with ROM of back, SLR negative, equal strength and reflexes in bilateral lower ext, no lumbar para spinous tenderness or tenderness to right shoulder     PSYCHIATRIC:  appropriate affect and demeanor; normal speech pattern; grossly normal memory;         Assessment:         M25.511   Pain in right shoulder       M54.5   Low back pain           ORDERS:         Meds Prescribed:       [New Rx] etodolac 400 mg oral tablet [take 1 tablet (400 mg) by oral route 2 times per day with food], #60 (sixty) tablets, Refills: 0 (zero)         Radiology/Test Orders:       [id number]  Right Radiologic exam, shoulder; comp, 2 views  (Send-Out)            [id number]  Radiologic examination, spine, lumbosacral;  minimum of four views  (Send-Out)                      Plan:         Pain in right shoulderwill try rx anti inflammatory         RADIOLOGY:  I have ordered Shoulder x-ray: right shoulder x-ray to be done today.      FOLLOW-UP:.  :for pending x-ray results           Prescriptions:       [New Rx] etodolac 400 mg oral tablet [take 1 tablet (400 mg) by oral route 2 times per day with food], #60 (sixty) tablets, Refills: 0 (zero)           Orders:       [id number]  Right Radiologic exam, shoulder; comp, 2 views  (Send-Out)              Low back pain        RADIOLOGY:  I have ordered Lumbar/Sacral Spine X-ray to be done today.            Orders:       [id number]  Radiologic examination, spine, lumbosacral;  minimum of four views  (Send-Out)                  Patient Recommendations:        For  Pain in right shoulder:                    APPOINTMENT INFORMATION:        Monday Tuesday Wednesday Thursday Friday Saturday Sunday            Time:___________________AM  PM    Date:_____________________             Charge Capture:         Primary Diagnosis:     M25.511  Pain in right shoulder           Orders:      [id number]  Office/outpatient visit; established patient, level 3  (In-House)              M54.5  Low back pain
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
## 2021-05-18 NOTE — PROGRESS NOTES
Hanna Matos  1990     Office/Outpatient Visit    Visit Date: Tue, Jan 7, 2020 01:05 pm    Provider: Rakel Moreno N.P. (Assistant: Spurling, Sarah C, MA)    Location: [facility]        Electronically signed by Rakel Moreno N.P. on  01/09/2020 10:23:02 PM                             Subjective:        CC: Ms. Matos is a 29 year old White female.  This is a follow-up visit.  wanting to get on nicotine patches and she wants to have her ears check. **pt is not taking her wellbutrin, or inhaler* & she has a knot on her face as well.;         HPI:           Ms. Matos desires help with smoking cessation.  She reports smoking 9-10 cigarettes per day.  She has smoked for.  have used ecigs to try to quit but started back after had baby           Complaint of localized swelling, mass and lump, head..  left lower chin with a re-occurring nodule  when flares , comes up as a red area - not able to pop - does not look like a pimple  would like to have it looked at           Complaint of acute serous otitis media, left ear..  left ear infection from urgent care before [place]  would like to make sure that it is clear - still having HA and dizziness ;  treated for infection, fluid and bronchitis     ROS:     CONSTITUTIONAL:  Negative for chills, fatigue, fever, and weight change.      EYES:  Negative for blurred vision.      CARDIOVASCULAR:  Negative for chest pain, orthopnea, paroxysmal nocturnal dyspnea and pedal edema.      RESPIRATORY:  Negative for dyspnea.      GASTROINTESTINAL:  Negative for abdominal pain, constipation, diarrhea, nausea and vomiting.      NEUROLOGICAL:  Negative for dizziness, headaches, paresthesias, and weakness.      PSYCHIATRIC:  Negative for anxiety, depression, and sleep disturbances.          Past Medical History / Family History / Social History:         Last Reviewed on 10/30/2019 01:44 PM by Jimena Olson    Past Medical History:                  PAST MEDICAL HISTORY         Asthma: dx'd at age 18 mos;     Pneumonia: hospitalized;     Urinary Tract Infections, Recurrent: dx'd at age 4; hospitilized;     Chicken pox     Cervical cancer         GYNECOLOGICAL HISTORY:     miscarriage 1    No problems with menstrual cycles.    Contraception: S/P tubal ligation;    Menarche occurred at age 15 yrs.    Last Pap was Last Pap was 3/2017; (+) hx of abnormal Pap ( she has undergone LEEP ) Sexually Active? yes         CURRENT MEDICAL PROVIDERS:    Obstetrician/Gynecologist: Dr SEALS         CURRENT MEDICAL PROVIDERS:    hosp-- for pylonephritis     miscarrage X1         PREVENTIVE HEALTH MAINTENANCE             PAP SMEAR: was last done  with normal results         Surgical History:         Bilateral Tubal Ligation         Family History:         Paternal Grandfather: Congestive Heart Failure (  );  Type 2 Diabetes     Maternal Grandfather: Lung Cancer (  ) Father:  at age 52; Cause of death was lymphoma    ; Positive for Myocardial Infarction;     Mother: Healthy    ; Positive for ADD/ADHD;     Son(s): Healthy; 2 son(s) total         Social History:     Occupation:. Homemaker     Marital Status: Single     Children: 2 children         Tobacco/Alcohol/Supplements:     Last Reviewed on 10/30/2019 01:29 PM by Zuleyka Larsen    Tobacco: Current Smoker: She currently smokes every day, 1/2 pack per day; (start age 16 pack-year history).  Non-drinker         Substance Abuse History:     Last Reviewed on 2017 03:13 PM by Kyleigh Schreiber    NEGATIVE         Mental Health History:     Last Reviewed on 2017 03:13 PM by Kyleigh Schreiber        Communicable Diseases (eg STDs):     Last Reviewed on 2017 03:13 PM by Kyleigh Schreiber        Current Problems:     Last Reviewed on 2020 01:07 PM by Spurling, Sarah C    Other mixed anxiety disorders    Migraine with aura, not intractable, without status migrainosus    Nicotine  dependence, unspecified, uncomplicated    Amenorrhea, unspecified        Immunizations:     DTaP 1/10/1991    DTaP 5/9/1991    DTaP 7/11/1991    DTaP 3/25/1992    DTaP 5/1/1995    Td adult 2/27/2005    Td adult 8/6/2002    PedvaxHIB (Hib PRP-OMP) 5/9/1991    PedvaxHIB (Hib PRP-OMP) 7/11/1991    PedvaxHIB (Hib PRP-OMP) 9/11/1991    PedvaxHIB (Hib PRP-OMP) 3/25/1992    Hep B (pedi/adol, 3-dose schedule) 8/29/2001    Hep B (pedi/adol, 3-dose schedule) 5/17/2002    Hep B (pedi/adol, 3-dose schedule) 1/9/2003    zzGardasil 3/7/2008    zzGardasil 5/1/2008    zzGardasil 9/5/2008    OPV  Poliovirus, live (oral) 1/10/1991    OPV  Poliovirus, live (oral) 5/9/1991    OPV  Poliovirus, live (oral) 3/25/1992    OPV  Poliovirus, live (oral) 5/1/1995    MMR  (Measles-Mumps-Rubella), live 5/25/1992    MMR  (Measles-Mumps-Rubella), live 8/29/2001    Fluzone Quadrivalent (3+ years) 2/1/2019    Influenza, split virus (3+ years dose) 11/22/2002    Menactra (Meningococcal MCV4P) 9/13/2002        Allergies:     Last Reviewed on 1/07/2020 01:07 PM by Spurling, Sarah C    No Known Allergies.        Current Medications:     Last Reviewed on 10/30/2019 01:30 PM by Zuleyka Larsen    Albuterol 90mcg/1actuation Oral Inhaler [1-2 puffs q 4-6 hrs PRN]    Wellbutrin XL 150mg Tablets, Extended Release [1 tab daily]        Objective:        Vitals:         Current: 1/7/2020 1:16:38 PM    Ht:  5 ft, 2.5 in;  Wt: 165.6 lbs;  BMI: 29.8T: 98.4 F (oral);  BP: 114/58 mm Hg (left arm, sitting);  P: 74 bpm (left arm (BP Cuff), sitting);  sCr: 0.68 mg/dL;  GFR: 123.27        Exams:     PHYSICAL EXAM:     GENERAL: vital signs recorded - well developed, well nourished;  no apparent distress;     NECK: range of motion is normal; thyroid is non-palpable;     RESPIRATORY: normal respiratory rate and pattern with no distress; normal breath sounds with no rales, rhonchi, wheezes or rubs;     CARDIOVASCULAR: normal rate; rhythm is regular;  no systolic murmur;  no edema;     LYMPHATIC: no enlargement of cervical or facial nodes;     BREAST/INTEGUMENT: cyst to left lower jaw  no inflammation, no pustule present;     NEUROLOGICAL:  cranial nerves, motor and sensory function, reflexes, gait and coordination are all intact;     PSYCHIATRIC:  appropriate affect and demeanor; normal speech pattern; grossly normal memory;         Assessment:         Z13.31   Encounter for screening for depression       F17.200   Nicotine dependence, unspecified, uncomplicated       R22.0   Localized swelling, mass and lump, head       H65.02   Acute serous otitis media, left ear           ORDERS:         Meds Prescribed:       [New Rx] nicotine 14 mg/24 hr Transdermal Patch, Transdermal 24 Hours [apply 1 patch (14 mg) by transdermal route once daily After 2 weeks, reduce to 7mg daily], #14 (fourteen) patches, Refills: 0 (zero)       [New Rx] nicotine 7 mg/24 hr Transdermal Patch, Transdermal 24 Hours [apply 1 patch (7 mg) by transdermal route once daily - apply after 2 weeks with the 14mg patch], #28 (twenty eight) patches, Refills: 0 (zero)         Procedures Ordered:       REFER  Referral to Specialist or Other Facility  (Send-Out)              Other Orders:         Depression screen negative  (In-House)                      Plan:         Encounter for screening for depression    MIPS PHQ-9 Depression Screening: Completed form scanned and in chart; Total Score 7; Positive Depression Screen but after further evaluation the patient does not have a diagnosis of depression.            Orders:         Depression screen negative  (In-House)              Nicotine dependence, unspecified, uncomplicated          Prescriptions:       [New Rx] nicotine 14 mg/24 hr Transdermal Patch, Transdermal 24 Hours [apply 1 patch (14 mg) by transdermal route once daily After 2 weeks, reduce to 7mg daily], #14 (fourteen) patches, Refills: 0 (zero)       [New Rx] nicotine 7 mg/24 hr Transdermal Patch,  Transdermal 24 Hours [apply 1 patch (7 mg) by transdermal route once daily - apply after 2 weeks with the 14mg patch], #28 (twenty eight) patches, Refills: 0 (zero)         Localized swelling, mass and lump, headrecommend that she go to derm  - appears to be cystic acne possible           REFERRALS:  Referral initiated to a dermatologist ( for evaluation of nodule on face  - re-occurring ).            Orders:       REFER  Referral to Specialist or Other Facility  (Send-Out)              Acute serous otitis media, left earcontinue amoxicillin as prescribed - start flonase OTC and zyrtec OTC this may help with the pain and follow up if no improvement            Charge Capture:         Primary Diagnosis:     Z13.31  Encounter for screening for depression           Orders:      85678  Office/outpatient visit; established patient, level 3  (In-House)              Depression screen negative  (In-House)              F17.200  Nicotine dependence, unspecified, uncomplicated     R22.0  Localized swelling, mass and lump, head     H65.02  Acute serous otitis media, left ear

## 2021-05-18 NOTE — PROGRESS NOTES
Hanna Matos JUANA 1990     Office/Outpatient Visit    Visit Date: Wed, Oct 30, 2019 01:24 pm    Provider: Jimena Olson N.P. (Assistant: Zuleyka Larsen)    Location: [facility]        Electronically signed by Jimena Olson N.P. on  10/30/2019 08:37:05 PM                             SUBJECTIVE:        CC:     Ms. Matos is a 29 year old White female.  vaginal odor and discharge, aprox. 1 wk--wants to discuss med change;         HPI:         Patient complains of vaginal discharge.  The presenting complaint is white vaginal discharge without irritation.  These have been present for the past 5 days.  Associated symptoms include foul-smelling vaginal odor and left lower abdominal pain.  She states that her last normal period was approximately >2 weeks ago.  tried OTC yeast rx, X 1 got nauseated and it  did not help No new partner.  No history of STD's.          Dx with anxiety; current treatment includes Lexapro.  side effect has been no sex drive, so has  not really been taking, dad ,  19, really struggling / looking into grief  counseling     ROS:     CONSTITUTIONAL:  Negative for fever.      GENITOURINARY:  Negative for dysuria and hematuria.      MUSCULOSKELETAL:  Negative for back pain.          PMH/FMH/SH:     Last Reviewed on 10/30/2019 01:44 PM by Jimena Olson    Past Medical History:                 PAST MEDICAL HISTORY         Asthma: dx'd at age 18 mos;     Pneumonia: hospitalized;     Urinary Tract Infections, Recurrent: dx'd at age 4; hospitilized;     Chicken pox     Cervical cancer         GYNECOLOGICAL HISTORY:     miscarriage 1    No problems with menstrual cycles.    Contraception: S/P tubal ligation;    Menarche occurred at age 15 yrs.    Last Pap was Last Pap was 3/2017; (+) hx of abnormal Pap Sexually Active? yes         CURRENT MEDICAL PROVIDERS:    Obstetrician/Gynecologist: Dr SEALS         CURRENT MEDICAL PROVIDERS:    hosp-- for pylonephritis      tuan X1         PREVENTIVE HEALTH MAINTENANCE             PAP SMEAR: was last done 2018 with normal results         Surgical History:         Bilateral Tubal Ligation         Family History:         Paternal Grandfather: Congestive Heart Failure (  );  Type 2 Diabetes     Maternal Grandfather: Lung Cancer (  ) Father:  at age 52; Cause of death was lymphoma    ; Positive for Myocardial Infarction;     Mother: Healthy    ; Positive for ADD/ADHD;     Son(s): Healthy; 2 son(s) total         Social History:     Occupation:. Homemaker     Marital Status: Single     Children: 2 children         Tobacco/Alcohol/Supplements:     Last Reviewed on 10/30/2019 01:29 PM by Zuleyka Larsen    Tobacco: Current Smoker: She currently smokes every day, 1/2 pack per day; (start age 16 pack-year history).  Non-drinker             Immunizations:     DTaP 1/10/1991     DTaP 1991     DTaP 1991     DTaP 3/25/1992     DTaP 1995     Td adult 2005     Td adult 2002     PedvaxHIB (Hib PRP-OMP) 1991     PedvaxHIB (Hib PRP-OMP) 1991     PedvaxHIB (Hib PRP-OMP) 1991     PedvaxHIB (Hib PRP-OMP) 3/25/1992     Hep B (pedi/adol, 3-dose schedule) 2001     Hep B (pedi/adol, 3-dose schedule) 2002     Hep B (pedi/adol, 3-dose schedule) 2003     zzGardasil 3/7/2008     zzGardasil 2008     zzGardasil 2008     OPV  Poliovirus, live (oral) 1/10/1991     OPV  Poliovirus, live (oral) 1991     OPV  Poliovirus, live (oral) 3/25/1992     OPV  Poliovirus, live (oral) 1995     MMR  (Measles-Mumps-Rubella), live 1992     MMR  (Measles-Mumps-Rubella), live 2001     Influenza, split virus (3+ years dose) 2002     Menactra (Meningococcal MCV4P) 2002         Allergies:     Last Reviewed on 10/30/2019 01:29 PM by Zuleyka Larsen      No Known Drug Allergies.         Current Medications:     Last Reviewed on 10/30/2019 01:30 PM by Zuleyka Larsen     Lexapro 10mg Tablet 1 tab daily     Albuterol 90mcg/1actuation Oral Inhaler 1-2 puffs q 4-6 hrs PRN         OBJECTIVE:        Vitals:         Current: 10/30/2019 1:33:22 PM    Ht:  5 ft, 2.5 in;  Wt: 165.4 lbs;  BMI: 29.8    T: 98.5 F (oral);  BP: 102/54 mm Hg (left arm, sitting);  P: 83 bpm (left arm (BP Cuff), sitting);  sCr: 0.68 mg/dL;  GFR: 123.20        Exams:     PHYSICAL EXAM:     GENERAL: vital signs recorded - well developed, well nourished;  no apparent distress;     RESPIRATORY: normal respiratory rate and pattern with no distress; normal breath sounds with no rales, rhonchi, wheezes or rubs;     CARDIOVASCULAR: normal rate; rhythm is regular;  no systolic murmur;     GENITOURINARY: external genitalia: normal without lesions or urethral abnormalities;; vagina: white vaginal discharge; ; cervix: scarring on cervix, scant white discharge noted noted;;  uterus: normal size and position, well-supported;; adnexa: left-sided tenderness;     PSYCHIATRIC: affect/demeanor: anxious;         ASSESSMENT           616.10   N76.0  Vaginal discharge              DDx:     300.02   F41.3  Anxiety              DDx:         ORDERS:         Meds Prescribed:       Wellbutrin XL (Bupropion HCl) 150mg Tablets, Extended Release 1 tab daily  #30 (Thirty) tablet(s) Refills: 1       MetroGel (Metronidazole) 0.75% Vaginal Gel Insert 1 applicatorful(s) in vagina at bedtime for 5 days  #70 (Seventy) gm Refills: 0         Lab Orders:       74594  BDUAM - [facility] Urinalysis, automated, with micro  (Send-Out)         09669  CTNGX- [facility] Chlamydia GC swab or urine (53027, 81083)  (Send-Out)         42254  BDKO - [facility] Tissue exam by KOH for fungi, ova or mites  (Send-Out)         06342  BDWET - [facility] Wet mount for infectious agents  (Send-Out)           Procedures Ordered:       REFER  Referral to Specialist or Other Facility  (Send-Out)                   PLAN:          Vaginal discharge no trich or yeast, 2+ bacteria,  occ clue cells, urinalysis  with only a few RBC's, waiting on GC&C     LABORATORY:  Labs ordered to be performed today include GC and Chlamydia [facility], KOH, urinalysis with micro, and Wet prep.  FOLLOW-UP: pending lab results           Prescriptions:       MetroGel (Metronidazole) 0.75% Vaginal Gel Insert 1 applicatorful(s) in vagina at bedtime for 5 days  #70 (Seventy) gm Refills: 0           Orders:       08482  BDUAM - [facility] Urinalysis, automated, with micro  (Send-Out)         86239  CTNGX- [facility] Chlamydia GC swab or urine (73639, 80859)  (Send-Out)         37704  BDKOH - [facility] Tissue exam by KOH for fungi, ova or mites  (Send-Out)         14607  BDWET - [facility] Wet mount for infectious agents  (Send-Out)            Anxiety         REFERRALS:  Referral initiated to a psychiatrist ( at [facility]; for evaluation of grief ).            Prescriptions:       Wellbutrin XL (Bupropion HCl) 150mg Tablets, Extended Release 1 tab daily  #30 (Thirty) tablet(s) Refills: 1           Orders:       REFER  Referral to Specialist or Other Facility  (Send-Out)               CHARGE CAPTURE           **Please note: ICD descriptions below are intended for billing purposes only and may not represent clinical diagnoses**        Primary Diagnosis:         616.10 Vaginal discharge            N76.0    Acute vaginitis              Orders:          56059   Office/outpatient visit; established patient, level 4  (In-House)           300.02 Anxiety            F41.3    Other mixed anxiety disorders

## 2021-06-21 ENCOUNTER — TRANSCRIBE ORDERS (OUTPATIENT)
Dept: FAMILY MEDICINE CLINIC | Age: 31
End: 2021-06-21

## 2021-06-21 DIAGNOSIS — Z01.818 PREOPERATIVE CLEARANCE: ICD-10-CM

## 2021-06-21 DIAGNOSIS — Z01.818 PREOPERATIVE CLEARANCE: Primary | ICD-10-CM

## 2021-06-21 LAB — SARS-COV-2 RNA RESP QL NAA+PROBE: NOT DETECTED

## 2021-06-21 PROCEDURE — U0003 INFECTIOUS AGENT DETECTION BY NUCLEIC ACID (DNA OR RNA); SEVERE ACUTE RESPIRATORY SYNDROME CORONAVIRUS 2 (SARS-COV-2) (CORONAVIRUS DISEASE [COVID-19]), AMPLIFIED PROBE TECHNIQUE, MAKING USE OF HIGH THROUGHPUT TECHNOLOGIES AS DESCRIBED BY CMS-2020-01-R: HCPCS | Performed by: OBSTETRICS & GYNECOLOGY

## 2021-06-24 PROBLEM — R10.2 PELVIC PAIN: Status: ACTIVE | Noted: 2021-06-24

## 2021-06-25 ENCOUNTER — HOSPITAL ENCOUNTER (OUTPATIENT)
Facility: HOSPITAL | Age: 31
Discharge: HOME | End: 2021-06-26
Attending: OBSTETRICS & GYNECOLOGY | Admitting: OBSTETRICS & GYNECOLOGY

## 2021-06-25 ENCOUNTER — ANESTHESIA EVENT (OUTPATIENT)
Dept: PERIOP | Facility: HOSPITAL | Age: 31
End: 2021-06-25

## 2021-06-25 ENCOUNTER — ANESTHESIA (OUTPATIENT)
Dept: PERIOP | Facility: HOSPITAL | Age: 31
End: 2021-06-25

## 2021-06-25 DIAGNOSIS — R10.2 PELVIC PAIN: Primary | ICD-10-CM

## 2021-06-25 LAB
B-HCG UR QL: NEGATIVE
BACTERIA UR QL AUTO: ABNORMAL /HPF
BASOPHILS # BLD AUTO: 0.05 10*3/MM3 (ref 0–0.2)
BASOPHILS NFR BLD AUTO: 0.6 % (ref 0–1.5)
BILIRUB UR QL STRIP: NEGATIVE
CLARITY UR: ABNORMAL
COLOR UR: YELLOW
DEPRECATED RDW RBC AUTO: 42.5 FL (ref 37–54)
EOSINOPHIL # BLD AUTO: 0.33 10*3/MM3 (ref 0–0.4)
EOSINOPHIL NFR BLD AUTO: 3.8 % (ref 0.3–6.2)
ERYTHROCYTE [DISTWIDTH] IN BLOOD BY AUTOMATED COUNT: 12.3 % (ref 12.3–15.4)
GLUCOSE UR STRIP-MCNC: NEGATIVE MG/DL
HCT VFR BLD AUTO: 40.2 % (ref 34–46.6)
HGB BLD-MCNC: 13.4 G/DL (ref 12–15.9)
HGB UR QL STRIP.AUTO: ABNORMAL
HYALINE CASTS UR QL AUTO: ABNORMAL /LPF
IMM GRANULOCYTES # BLD AUTO: 0.02 10*3/MM3 (ref 0–0.05)
IMM GRANULOCYTES NFR BLD AUTO: 0.2 % (ref 0–0.5)
KETONES UR QL STRIP: NEGATIVE
LEUKOCYTE ESTERASE UR QL STRIP.AUTO: NEGATIVE
LYMPHOCYTES # BLD AUTO: 2.96 10*3/MM3 (ref 0.7–3.1)
LYMPHOCYTES NFR BLD AUTO: 34.5 % (ref 19.6–45.3)
MCH RBC QN AUTO: 31.4 PG (ref 26.6–33)
MCHC RBC AUTO-ENTMCNC: 33.3 G/DL (ref 31.5–35.7)
MCV RBC AUTO: 94.1 FL (ref 79–97)
MONOCYTES # BLD AUTO: 0.64 10*3/MM3 (ref 0.1–0.9)
MONOCYTES NFR BLD AUTO: 7.5 % (ref 5–12)
NEUTROPHILS NFR BLD AUTO: 4.59 10*3/MM3 (ref 1.7–7)
NEUTROPHILS NFR BLD AUTO: 53.4 % (ref 42.7–76)
NITRITE UR QL STRIP: NEGATIVE
NRBC BLD AUTO-RTO: 0 /100 WBC (ref 0–0.2)
PH UR STRIP.AUTO: 5.5 [PH] (ref 5–8)
PLATELET # BLD AUTO: 195 10*3/MM3 (ref 140–450)
PMV BLD AUTO: 9.7 FL (ref 6–12)
PROT UR QL STRIP: ABNORMAL
RBC # BLD AUTO: 4.27 10*6/MM3 (ref 3.77–5.28)
RBC # UR: ABNORMAL /HPF
REF LAB TEST METHOD: ABNORMAL
SP GR UR STRIP: >1.03 (ref 1–1.03)
SQUAMOUS #/AREA URNS HPF: ABNORMAL /HPF
UROBILINOGEN UR QL STRIP: ABNORMAL
WBC # BLD AUTO: 8.59 10*3/MM3 (ref 3.4–10.8)
WBC UR QL AUTO: ABNORMAL /HPF

## 2021-06-25 PROCEDURE — 81025 URINE PREGNANCY TEST: CPT | Performed by: OBSTETRICS & GYNECOLOGY

## 2021-06-25 PROCEDURE — 25010000002 ONDANSETRON PER 1 MG: Performed by: NURSE ANESTHETIST, CERTIFIED REGISTERED

## 2021-06-25 PROCEDURE — 25010000002 KETOROLAC TROMETHAMINE PER 15 MG: Performed by: OBSTETRICS & GYNECOLOGY

## 2021-06-25 PROCEDURE — 25010000003 CEFAZOLIN IN DEXTROSE 2-4 GM/100ML-% SOLUTION: Performed by: OBSTETRICS & GYNECOLOGY

## 2021-06-25 PROCEDURE — G0378 HOSPITAL OBSERVATION PER HR: HCPCS

## 2021-06-25 PROCEDURE — 88309 TISSUE EXAM BY PATHOLOGIST: CPT | Performed by: OBSTETRICS & GYNECOLOGY

## 2021-06-25 PROCEDURE — 25010000002 PROPOFOL 10 MG/ML EMULSION: Performed by: NURSE ANESTHETIST, CERTIFIED REGISTERED

## 2021-06-25 PROCEDURE — 25010000002 HYDROMORPHONE PER 4 MG: Performed by: NURSE ANESTHETIST, CERTIFIED REGISTERED

## 2021-06-25 PROCEDURE — 25010000002 FENTANYL CITRATE (PF) 50 MCG/ML SOLUTION: Performed by: NURSE ANESTHETIST, CERTIFIED REGISTERED

## 2021-06-25 PROCEDURE — 81001 URINALYSIS AUTO W/SCOPE: CPT | Performed by: OBSTETRICS & GYNECOLOGY

## 2021-06-25 PROCEDURE — 25010000002 HYDROMORPHONE 1 MG/ML SOLUTION

## 2021-06-25 PROCEDURE — 25010000002 MIDAZOLAM PER 1MG: Performed by: ANESTHESIOLOGY

## 2021-06-25 PROCEDURE — 25010000002 NEOSTIGMINE 10 MG/10ML SOLUTION: Performed by: NURSE ANESTHETIST, CERTIFIED REGISTERED

## 2021-06-25 PROCEDURE — 25010000002 HYDROMORPHONE 1 MG/ML SOLUTION: Performed by: OBSTETRICS & GYNECOLOGY

## 2021-06-25 PROCEDURE — 85025 COMPLETE CBC W/AUTO DIFF WBC: CPT | Performed by: OBSTETRICS & GYNECOLOGY

## 2021-06-25 PROCEDURE — 94799 UNLISTED PULMONARY SVC/PX: CPT

## 2021-06-25 RX ORDER — ACETAMINOPHEN 500 MG
1000 TABLET ORAL ONCE
Status: COMPLETED | OUTPATIENT
Start: 2021-06-25 | End: 2021-06-25

## 2021-06-25 RX ORDER — ACETAMINOPHEN 650 MG
TABLET, EXTENDED RELEASE ORAL AS NEEDED
Status: DISCONTINUED | OUTPATIENT
Start: 2021-06-25 | End: 2021-06-25 | Stop reason: HOSPADM

## 2021-06-25 RX ORDER — CEFAZOLIN SODIUM 2 G/100ML
2 INJECTION, SOLUTION INTRAVENOUS ONCE
Status: COMPLETED | OUTPATIENT
Start: 2021-06-25 | End: 2021-06-25

## 2021-06-25 RX ORDER — LIDOCAINE HYDROCHLORIDE 20 MG/ML
INJECTION, SOLUTION INFILTRATION; PERINEURAL AS NEEDED
Status: DISCONTINUED | OUTPATIENT
Start: 2021-06-25 | End: 2021-06-25 | Stop reason: SURG

## 2021-06-25 RX ORDER — MEPERIDINE HYDROCHLORIDE 25 MG/ML
12.5 INJECTION INTRAMUSCULAR; INTRAVENOUS; SUBCUTANEOUS
Status: DISCONTINUED | OUTPATIENT
Start: 2021-06-25 | End: 2021-06-25 | Stop reason: HOSPADM

## 2021-06-25 RX ORDER — MIDAZOLAM HYDROCHLORIDE 1 MG/ML
2 INJECTION INTRAMUSCULAR; INTRAVENOUS ONCE
Status: DISCONTINUED | OUTPATIENT
Start: 2021-06-25 | End: 2021-06-25 | Stop reason: HOSPADM

## 2021-06-25 RX ORDER — HYDROCODONE BITARTRATE AND ACETAMINOPHEN 7.5; 325 MG/1; MG/1
1 TABLET ORAL EVERY 4 HOURS PRN
Status: DISCONTINUED | OUTPATIENT
Start: 2021-06-25 | End: 2021-06-26 | Stop reason: HOSPADM

## 2021-06-25 RX ORDER — ACETAMINOPHEN 500 MG
1000 TABLET ORAL ONCE
Status: DISCONTINUED | OUTPATIENT
Start: 2021-06-25 | End: 2021-06-25 | Stop reason: SDUPTHER

## 2021-06-25 RX ORDER — ONDANSETRON 4 MG/1
4 TABLET, FILM COATED ORAL EVERY 6 HOURS PRN
Status: DISCONTINUED | OUTPATIENT
Start: 2021-06-25 | End: 2021-06-26 | Stop reason: HOSPADM

## 2021-06-25 RX ORDER — MIDAZOLAM HYDROCHLORIDE 1 MG/ML
2 INJECTION INTRAMUSCULAR; INTRAVENOUS ONCE
Status: COMPLETED | OUTPATIENT
Start: 2021-06-25 | End: 2021-06-25

## 2021-06-25 RX ORDER — SODIUM CHLORIDE 0.9 % (FLUSH) 0.9 %
10 SYRINGE (ML) INJECTION AS NEEDED
Status: DISCONTINUED | OUTPATIENT
Start: 2021-06-25 | End: 2021-06-25 | Stop reason: HOSPADM

## 2021-06-25 RX ORDER — SODIUM CHLORIDE, SODIUM LACTATE, POTASSIUM CHLORIDE, CALCIUM CHLORIDE 600; 310; 30; 20 MG/100ML; MG/100ML; MG/100ML; MG/100ML
9 INJECTION, SOLUTION INTRAVENOUS CONTINUOUS PRN
Status: DISCONTINUED | OUTPATIENT
Start: 2021-06-25 | End: 2021-06-25 | Stop reason: HOSPADM

## 2021-06-25 RX ORDER — ROCURONIUM BROMIDE 10 MG/ML
INJECTION, SOLUTION INTRAVENOUS AS NEEDED
Status: DISCONTINUED | OUTPATIENT
Start: 2021-06-25 | End: 2021-06-25 | Stop reason: SURG

## 2021-06-25 RX ORDER — KETOROLAC TROMETHAMINE 30 MG/ML
30 INJECTION, SOLUTION INTRAMUSCULAR; INTRAVENOUS EVERY 6 HOURS PRN
Status: DISCONTINUED | OUTPATIENT
Start: 2021-06-25 | End: 2021-06-26 | Stop reason: HOSPADM

## 2021-06-25 RX ORDER — SODIUM CHLORIDE, SODIUM LACTATE, POTASSIUM CHLORIDE, CALCIUM CHLORIDE 600; 310; 30; 20 MG/100ML; MG/100ML; MG/100ML; MG/100ML
9 INJECTION, SOLUTION INTRAVENOUS CONTINUOUS PRN
Status: DISCONTINUED | OUTPATIENT
Start: 2021-06-25 | End: 2021-06-25 | Stop reason: SDUPTHER

## 2021-06-25 RX ORDER — NEOSTIGMINE METHYLSULFATE 1 MG/ML
INJECTION, SOLUTION INTRAVENOUS AS NEEDED
Status: DISCONTINUED | OUTPATIENT
Start: 2021-06-25 | End: 2021-06-25 | Stop reason: SURG

## 2021-06-25 RX ORDER — SODIUM CHLORIDE, SODIUM LACTATE, POTASSIUM CHLORIDE, CALCIUM CHLORIDE 600; 310; 30; 20 MG/100ML; MG/100ML; MG/100ML; MG/100ML
125 INJECTION, SOLUTION INTRAVENOUS CONTINUOUS
Status: DISCONTINUED | OUTPATIENT
Start: 2021-06-25 | End: 2021-06-25 | Stop reason: SDUPTHER

## 2021-06-25 RX ORDER — ONDANSETRON 2 MG/ML
4 INJECTION INTRAMUSCULAR; INTRAVENOUS ONCE AS NEEDED
Status: DISCONTINUED | OUTPATIENT
Start: 2021-06-25 | End: 2021-06-25 | Stop reason: HOSPADM

## 2021-06-25 RX ORDER — GLYCOPYRROLATE 0.2 MG/ML
0.2 INJECTION INTRAMUSCULAR; INTRAVENOUS
Status: DISCONTINUED | OUTPATIENT
Start: 2021-06-25 | End: 2021-06-25 | Stop reason: HOSPADM

## 2021-06-25 RX ORDER — SODIUM CHLORIDE, SODIUM LACTATE, POTASSIUM CHLORIDE, CALCIUM CHLORIDE 600; 310; 30; 20 MG/100ML; MG/100ML; MG/100ML; MG/100ML
125 INJECTION, SOLUTION INTRAVENOUS CONTINUOUS
Status: DISCONTINUED | OUTPATIENT
Start: 2021-06-25 | End: 2021-06-26 | Stop reason: HOSPADM

## 2021-06-25 RX ORDER — SODIUM CHLORIDE 0.9 % (FLUSH) 0.9 %
3 SYRINGE (ML) INJECTION EVERY 12 HOURS SCHEDULED
Status: DISCONTINUED | OUTPATIENT
Start: 2021-06-25 | End: 2021-06-25 | Stop reason: HOSPADM

## 2021-06-25 RX ORDER — ONDANSETRON 2 MG/ML
INJECTION INTRAMUSCULAR; INTRAVENOUS AS NEEDED
Status: DISCONTINUED | OUTPATIENT
Start: 2021-06-25 | End: 2021-06-25 | Stop reason: SURG

## 2021-06-25 RX ORDER — HYDROMORPHONE HCL 110MG/55ML
PATIENT CONTROLLED ANALGESIA SYRINGE INTRAVENOUS AS NEEDED
Status: DISCONTINUED | OUTPATIENT
Start: 2021-06-25 | End: 2021-06-25 | Stop reason: SURG

## 2021-06-25 RX ORDER — SODIUM CHLORIDE 0.9 % (FLUSH) 0.9 %
10 SYRINGE (ML) INJECTION EVERY 12 HOURS SCHEDULED
Status: DISCONTINUED | OUTPATIENT
Start: 2021-06-25 | End: 2021-06-25 | Stop reason: HOSPADM

## 2021-06-25 RX ORDER — GLYCOPYRROLATE 0.2 MG/ML
INJECTION INTRAMUSCULAR; INTRAVENOUS AS NEEDED
Status: DISCONTINUED | OUTPATIENT
Start: 2021-06-25 | End: 2021-06-25 | Stop reason: SURG

## 2021-06-25 RX ORDER — PROMETHAZINE HYDROCHLORIDE 12.5 MG/1
25 TABLET ORAL ONCE AS NEEDED
Status: DISCONTINUED | OUTPATIENT
Start: 2021-06-25 | End: 2021-06-25 | Stop reason: HOSPADM

## 2021-06-25 RX ORDER — CEFAZOLIN SODIUM 2 G/100ML
2 INJECTION, SOLUTION INTRAVENOUS EVERY 8 HOURS
Status: COMPLETED | OUTPATIENT
Start: 2021-06-25 | End: 2021-06-26

## 2021-06-25 RX ORDER — SCOLOPAMINE TRANSDERMAL SYSTEM 1 MG/1
1 PATCH, EXTENDED RELEASE TRANSDERMAL CONTINUOUS
Status: DISPENSED | OUTPATIENT
Start: 2021-06-25 | End: 2021-06-26

## 2021-06-25 RX ORDER — VASOPRESSIN 20 U/ML
INJECTION PARENTERAL AS NEEDED
Status: DISCONTINUED | OUTPATIENT
Start: 2021-06-25 | End: 2021-06-25 | Stop reason: HOSPADM

## 2021-06-25 RX ORDER — ONDANSETRON 2 MG/ML
4 INJECTION INTRAMUSCULAR; INTRAVENOUS EVERY 6 HOURS PRN
Status: DISCONTINUED | OUTPATIENT
Start: 2021-06-25 | End: 2021-06-26 | Stop reason: HOSPADM

## 2021-06-25 RX ORDER — NALOXONE HCL 0.4 MG/ML
0.1 VIAL (ML) INJECTION
Status: DISCONTINUED | OUTPATIENT
Start: 2021-06-25 | End: 2021-06-26 | Stop reason: HOSPADM

## 2021-06-25 RX ORDER — FENTANYL CITRATE 50 UG/ML
INJECTION, SOLUTION INTRAMUSCULAR; INTRAVENOUS AS NEEDED
Status: DISCONTINUED | OUTPATIENT
Start: 2021-06-25 | End: 2021-06-25 | Stop reason: SURG

## 2021-06-25 RX ORDER — PROMETHAZINE HYDROCHLORIDE 25 MG/1
25 SUPPOSITORY RECTAL ONCE AS NEEDED
Status: DISCONTINUED | OUTPATIENT
Start: 2021-06-25 | End: 2021-06-25 | Stop reason: HOSPADM

## 2021-06-25 RX ORDER — MAGNESIUM HYDROXIDE 1200 MG/15ML
LIQUID ORAL AS NEEDED
Status: DISCONTINUED | OUTPATIENT
Start: 2021-06-25 | End: 2021-06-25 | Stop reason: HOSPADM

## 2021-06-25 RX ORDER — OXYCODONE HYDROCHLORIDE 5 MG/1
5 TABLET ORAL
Status: DISCONTINUED | OUTPATIENT
Start: 2021-06-25 | End: 2021-06-25 | Stop reason: HOSPADM

## 2021-06-25 RX ADMIN — KETOROLAC TROMETHAMINE 30 MG: 30 INJECTION, SOLUTION INTRAMUSCULAR; INTRAVENOUS at 15:54

## 2021-06-25 RX ADMIN — NEOSTIGMINE METHYLSULFATE 2 MG: 1 INJECTION, SOLUTION INTRAVENOUS at 10:20

## 2021-06-25 RX ADMIN — SODIUM CHLORIDE, POTASSIUM CHLORIDE, SODIUM LACTATE AND CALCIUM CHLORIDE 125 ML/HR: 600; 310; 30; 20 INJECTION, SOLUTION INTRAVENOUS at 22:23

## 2021-06-25 RX ADMIN — GLYCOPYRROLATE 0.4 MG: 0.2 INJECTION INTRAMUSCULAR; INTRAVENOUS at 10:19

## 2021-06-25 RX ADMIN — SODIUM CHLORIDE, POTASSIUM CHLORIDE, SODIUM LACTATE AND CALCIUM CHLORIDE 9 ML/HR: 600; 310; 30; 20 INJECTION, SOLUTION INTRAVENOUS at 08:19

## 2021-06-25 RX ADMIN — CEFAZOLIN SODIUM 2 G: 2 INJECTION, SOLUTION INTRAVENOUS at 17:25

## 2021-06-25 RX ADMIN — MIDAZOLAM HYDROCHLORIDE 2 MG: 1 INJECTION, SOLUTION INTRAMUSCULAR; INTRAVENOUS at 09:21

## 2021-06-25 RX ADMIN — FENTANYL CITRATE 100 MCG: 50 INJECTION, SOLUTION INTRAMUSCULAR; INTRAVENOUS at 09:50

## 2021-06-25 RX ADMIN — KETOROLAC TROMETHAMINE 30 MG: 30 INJECTION, SOLUTION INTRAMUSCULAR; INTRAVENOUS at 23:43

## 2021-06-25 RX ADMIN — FENTANYL CITRATE 50 MCG: 50 INJECTION, SOLUTION INTRAMUSCULAR; INTRAVENOUS at 09:36

## 2021-06-25 RX ADMIN — HYDROMORPHONE HYDROCHLORIDE 0.5 MG: 1 INJECTION, SOLUTION INTRAMUSCULAR; INTRAVENOUS; SUBCUTANEOUS at 10:50

## 2021-06-25 RX ADMIN — ROCURONIUM BROMIDE 50 MG: 10 INJECTION INTRAVENOUS at 09:31

## 2021-06-25 RX ADMIN — ONDANSETRON 4 MG: 2 INJECTION INTRAMUSCULAR; INTRAVENOUS at 09:57

## 2021-06-25 RX ADMIN — HYDROCODONE BITARTRATE AND ACETAMINOPHEN 1 TABLET: 7.5; 325 TABLET ORAL at 20:26

## 2021-06-25 RX ADMIN — HYDROMORPHONE HYDROCHLORIDE 1 MG: 2 INJECTION, SOLUTION INTRAMUSCULAR; INTRAVENOUS; SUBCUTANEOUS at 10:04

## 2021-06-25 RX ADMIN — PROPOFOL 180 MCG/KG/MIN: 10 INJECTION, EMULSION INTRAVENOUS at 09:29

## 2021-06-25 RX ADMIN — HYDROMORPHONE HYDROCHLORIDE 0.5 MG: 1 INJECTION, SOLUTION INTRAMUSCULAR; INTRAVENOUS; SUBCUTANEOUS at 12:35

## 2021-06-25 RX ADMIN — SODIUM CHLORIDE, POTASSIUM CHLORIDE, SODIUM LACTATE AND CALCIUM CHLORIDE: 600; 310; 30; 20 INJECTION, SOLUTION INTRAVENOUS at 09:27

## 2021-06-25 RX ADMIN — SODIUM CHLORIDE, POTASSIUM CHLORIDE, SODIUM LACTATE AND CALCIUM CHLORIDE: 600; 310; 30; 20 INJECTION, SOLUTION INTRAVENOUS at 10:14

## 2021-06-25 RX ADMIN — OXYCODONE HYDROCHLORIDE 5 MG: 5 TABLET ORAL at 11:00

## 2021-06-25 RX ADMIN — SODIUM CHLORIDE, POTASSIUM CHLORIDE, SODIUM LACTATE AND CALCIUM CHLORIDE 125 ML/HR: 600; 310; 30; 20 INJECTION, SOLUTION INTRAVENOUS at 14:10

## 2021-06-25 RX ADMIN — HYDROCODONE BITARTRATE AND ACETAMINOPHEN 1 TABLET: 7.5; 325 TABLET ORAL at 13:46

## 2021-06-25 RX ADMIN — FENTANYL CITRATE 100 MCG: 50 INJECTION, SOLUTION INTRAMUSCULAR; INTRAVENOUS at 09:43

## 2021-06-25 RX ADMIN — Medication 0.5 MG: at 10:50

## 2021-06-25 RX ADMIN — SCOPALAMINE 1 PATCH: 1 PATCH, EXTENDED RELEASE TRANSDERMAL at 09:20

## 2021-06-25 RX ADMIN — HYDROMORPHONE HYDROCHLORIDE 0.5 MG: 1 INJECTION, SOLUTION INTRAMUSCULAR; INTRAVENOUS; SUBCUTANEOUS at 17:17

## 2021-06-25 RX ADMIN — LIDOCAINE HYDROCHLORIDE 30 MG: 20 INJECTION, SOLUTION INFILTRATION; PERINEURAL at 09:29

## 2021-06-25 RX ADMIN — ACETAMINOPHEN 1000 MG: 500 TABLET ORAL at 08:19

## 2021-06-25 RX ADMIN — CEFAZOLIN SODIUM 2 G: 2 INJECTION, SOLUTION INTRAVENOUS at 09:27

## 2021-06-25 NOTE — ANESTHESIA POSTPROCEDURE EVALUATION
Patient: Hanna Matos    Procedure Summary     Date: 06/25/21 Room / Location: [facility] OR 03 / [facility] MAIN OR    Anesthesia Start: 0927 Anesthesia Stop: 1034    Procedure: TOTAL VAGINAL HYSTERECTOMY (N/A Uterus) Diagnosis: (PELVIC PAIN, CERVICAL DYSPLASIA)    Surgeons: Que Bo MD Provider:     Anesthesia Type: general ASA Status: 2          Anesthesia Type: general    Vitals  Vitals Value Taken Time   /61 06/25/21 1101   Temp 36.3 °C (97.3 °F) 06/25/21 1032   Pulse 80 06/25/21 1105   Resp     SpO2 94 % 06/25/21 1105   Vitals shown include unvalidated device data.        Post Anesthesia Care and Evaluation    Patient location during evaluation: bedside  Patient participation: complete - patient participated  Level of consciousness: awake  Pain score: 0  Pain management: adequate  Airway patency: patent  Anesthetic complications: No anesthetic complications  PONV Status: none  Cardiovascular status: acceptable and stable  Respiratory status: acceptable and room air  Hydration status: acceptable

## 2021-06-25 NOTE — ANESTHESIA PREPROCEDURE EVALUATION
Anesthesia Evaluation     Patient summary reviewed and Nursing notes reviewed   no history of anesthetic complications:  NPO Solid Status: > 8 hours  NPO Liquid Status: > 2 hours           Airway   Mallampati: I  TM distance: >3 FB  Neck ROM: full  No difficulty expected  Dental      Pulmonary - normal exam    breath sounds clear to auscultation  (+) asthma,  Cardiovascular - negative cardio ROS and normal exam  Exercise tolerance: excellent (>7 METS)    Rhythm: regular        Neuro/Psych- negative ROS  GI/Hepatic/Renal/Endo - negative ROS     Musculoskeletal (-) negative ROS    Abdominal    Substance History - negative use     OB/GYN negative ob/gyn ROS         Other - negative ROS                       Anesthesia Plan    ASA 2     general     intravenous induction     Anesthetic plan, all risks, benefits, and alternatives have been provided, discussed and informed consent has been obtained with: patient.

## 2021-06-26 ENCOUNTER — READMISSION MANAGEMENT (OUTPATIENT)
Dept: CALL CENTER | Facility: HOSPITAL | Age: 31
End: 2021-06-26

## 2021-06-26 VITALS
WEIGHT: 175.93 LBS | HEIGHT: 61 IN | TEMPERATURE: 98 F | SYSTOLIC BLOOD PRESSURE: 102 MMHG | BODY MASS INDEX: 33.22 KG/M2 | RESPIRATION RATE: 18 BRPM | HEART RATE: 66 BPM | OXYGEN SATURATION: 98 % | DIASTOLIC BLOOD PRESSURE: 64 MMHG

## 2021-06-26 LAB
BASOPHILS # BLD AUTO: 0.04 10*3/MM3 (ref 0–0.2)
BASOPHILS NFR BLD AUTO: 0.3 % (ref 0–1.5)
DEPRECATED RDW RBC AUTO: 43 FL (ref 37–54)
EOSINOPHIL # BLD AUTO: 0.11 10*3/MM3 (ref 0–0.4)
EOSINOPHIL NFR BLD AUTO: 0.8 % (ref 0.3–6.2)
ERYTHROCYTE [DISTWIDTH] IN BLOOD BY AUTOMATED COUNT: 12.3 % (ref 12.3–15.4)
HCT VFR BLD AUTO: 34.7 % (ref 34–46.6)
HGB BLD-MCNC: 11.5 G/DL (ref 12–15.9)
IMM GRANULOCYTES # BLD AUTO: 0.05 10*3/MM3 (ref 0–0.05)
IMM GRANULOCYTES NFR BLD AUTO: 0.4 % (ref 0–0.5)
LYMPHOCYTES # BLD AUTO: 2.46 10*3/MM3 (ref 0.7–3.1)
LYMPHOCYTES NFR BLD AUTO: 19 % (ref 19.6–45.3)
MCH RBC QN AUTO: 31.5 PG (ref 26.6–33)
MCHC RBC AUTO-ENTMCNC: 33.1 G/DL (ref 31.5–35.7)
MCV RBC AUTO: 95.1 FL (ref 79–97)
MONOCYTES # BLD AUTO: 0.81 10*3/MM3 (ref 0.1–0.9)
MONOCYTES NFR BLD AUTO: 6.3 % (ref 5–12)
NEUTROPHILS NFR BLD AUTO: 73.2 % (ref 42.7–76)
NEUTROPHILS NFR BLD AUTO: 9.48 10*3/MM3 (ref 1.7–7)
NRBC BLD AUTO-RTO: 0 /100 WBC (ref 0–0.2)
PLATELET # BLD AUTO: 181 10*3/MM3 (ref 140–450)
PMV BLD AUTO: 10.2 FL (ref 6–12)
RBC # BLD AUTO: 3.65 10*6/MM3 (ref 3.77–5.28)
WBC # BLD AUTO: 12.95 10*3/MM3 (ref 3.4–10.8)

## 2021-06-26 PROCEDURE — G0378 HOSPITAL OBSERVATION PER HR: HCPCS

## 2021-06-26 PROCEDURE — 85025 COMPLETE CBC W/AUTO DIFF WBC: CPT | Performed by: OBSTETRICS & GYNECOLOGY

## 2021-06-26 PROCEDURE — 25010000003 CEFAZOLIN IN DEXTROSE 2-4 GM/100ML-% SOLUTION: Performed by: OBSTETRICS & GYNECOLOGY

## 2021-06-26 RX ORDER — IBUPROFEN 600 MG/1
600 TABLET ORAL EVERY 6 HOURS PRN
Qty: 30 TABLET | Refills: 1 | Status: SHIPPED | OUTPATIENT
Start: 2021-06-26 | End: 2021-10-18

## 2021-06-26 RX ORDER — DOCUSATE SODIUM 100 MG/1
100 CAPSULE, LIQUID FILLED ORAL DAILY
Qty: 30 CAPSULE | Refills: 1 | Status: SHIPPED | OUTPATIENT
Start: 2021-06-26 | End: 2021-10-18

## 2021-06-26 RX ORDER — HYDROCODONE BITARTRATE AND ACETAMINOPHEN 7.5; 325 MG/1; MG/1
1 TABLET ORAL EVERY 4 HOURS PRN
Qty: 15 TABLET | Refills: 0 | Status: SHIPPED | OUTPATIENT
Start: 2021-06-26 | End: 2021-07-02

## 2021-06-26 RX ADMIN — CEFAZOLIN SODIUM 2 G: 2 INJECTION, SOLUTION INTRAVENOUS at 02:24

## 2021-06-26 RX ADMIN — HYDROCODONE BITARTRATE AND ACETAMINOPHEN 1 TABLET: 7.5; 325 TABLET ORAL at 06:46

## 2021-06-26 RX ADMIN — HYDROCODONE BITARTRATE AND ACETAMINOPHEN 1 TABLET: 7.5; 325 TABLET ORAL at 10:43

## 2021-06-26 NOTE — OUTREACH NOTE
Prep Survey      Responses   [facility] patient discharged from?  [facility]   Is LACE score < 7 ?  Yes   Emergency Room discharge w/ pulse ox?  No   Eligibility  TCM   Hospital  [facility]   Date of Admission  06/25/21   Date of Discharge  06/26/21   Discharge Disposition  Home or Self Care   Discharge diagnosis  Pelvic pain-Vaginal hysterectomy this visit   Does the patient have one of the following disease processes/diagnoses(primary or secondary)?  General Surgery   Does the patient have Home health ordered?  No   Is there a DME ordered?  No   Prep survey completed?  Yes          Rocio Joshi RN

## 2021-06-28 ENCOUNTER — TRANSITIONAL CARE MANAGEMENT TELEPHONE ENCOUNTER (OUTPATIENT)
Dept: CALL CENTER | Facility: HOSPITAL | Age: 31
End: 2021-06-28

## 2021-06-28 LAB
CYTO UR: NORMAL
LAB AP CASE REPORT: NORMAL
LAB AP CLINICAL INFORMATION: NORMAL
PATH REPORT.FINAL DX SPEC: NORMAL
PATH REPORT.GROSS SPEC: NORMAL

## 2021-06-28 NOTE — OUTREACH NOTE
Call Center TCM Note      Responses   [facility] patient discharged from?  [facility]   Does the patient have one of the following disease processes/diagnoses(primary or secondary)?  General Surgery   TCM attempt successful?  Yes [Verbal release - Darshan]   Call start time  1413   Call end time  1420   Discharge diagnosis  Pelvic pain-Vaginal hysterectomy this visit   Meds reviewed with patient/caregiver?  Yes   Is the patient having any side effects they believe may be caused by any medication additions or changes?  No   Does the patient have all medications related to this admission filled (includes all antibiotics, pain medications, etc.)  Yes   Is the patient taking all medications as directed (includes completed medication regime)?  Yes   Does the patient have a follow up appointment scheduled with their surgeon?  No   What is preventing the patient from scheduling follow up appointments?  -- [Pt reports she will call and make the appt. ]   Nursing Interventions  Educated patient on importance of making appointment, Advised patient to make appointment   Has the patient kept scheduled appointments due by today?  N/A   Comments  HOSP DC FU appt with PCP 7/6/21 @ 2:30pm.   Has home health visited the patient within 72 hours of discharge?  N/A   Psychosocial issues?  No   Did the patient receive a copy of their discharge instructions?  Yes   Nursing interventions  Reviewed instructions with patient   What is the patient's perception of their health status since discharge?  Improving [Pt does report some back pain. Pt aware to call Dr if does not improve or if worsens.]   Nursing interventions  Nurse provided patient education   Is the patient /caregiver able to teach back basic post-op care?  Lifting as instructed by MD in discharge instructions   Is the patient/caregiver able to teach back signs and symptoms of incisional infection?  Fever   Is the patient/caregiver able to teach back steps to recovery at home?   Eat a well-balance diet, Rest and rebuild strength, gradually increase activity, Set small, achievable goals for return to baseline health   If the patient is a current smoker, are they able to teach back resources for cessation?  Smoking cessation medications [Pt states that she has not smoked since hospital stay.]   Is the patient/caregiver able to teach back the hierarchy of who to call/visit for symptoms/problems? PCP, Specialist, Home health nurse, Urgent Care, ED, 911  Yes   TCM call completed?  Yes          Dai Ramey RN    6/28/2021, 14:22 EDT

## 2021-07-01 VITALS
TEMPERATURE: 98.5 F | BODY MASS INDEX: 29.3 KG/M2 | SYSTOLIC BLOOD PRESSURE: 102 MMHG | WEIGHT: 165.4 LBS | DIASTOLIC BLOOD PRESSURE: 54 MMHG | HEART RATE: 83 BPM | HEIGHT: 63 IN

## 2021-07-01 VITALS
WEIGHT: 156.4 LBS | TEMPERATURE: 98.4 F | DIASTOLIC BLOOD PRESSURE: 62 MMHG | BODY MASS INDEX: 27.71 KG/M2 | HEART RATE: 86 BPM | HEIGHT: 63 IN | SYSTOLIC BLOOD PRESSURE: 117 MMHG

## 2021-07-01 VITALS
WEIGHT: 156.6 LBS | SYSTOLIC BLOOD PRESSURE: 111 MMHG | DIASTOLIC BLOOD PRESSURE: 60 MMHG | BODY MASS INDEX: 27.75 KG/M2 | HEART RATE: 92 BPM | TEMPERATURE: 97.7 F | HEIGHT: 63 IN

## 2021-07-02 VITALS
HEIGHT: 63 IN | DIASTOLIC BLOOD PRESSURE: 58 MMHG | HEART RATE: 74 BPM | SYSTOLIC BLOOD PRESSURE: 114 MMHG | TEMPERATURE: 98.4 F | BODY MASS INDEX: 29.34 KG/M2 | WEIGHT: 165.6 LBS

## 2021-07-02 VITALS
DIASTOLIC BLOOD PRESSURE: 63 MMHG | TEMPERATURE: 96.9 F | WEIGHT: 175.2 LBS | BODY MASS INDEX: 31.04 KG/M2 | HEIGHT: 63 IN | SYSTOLIC BLOOD PRESSURE: 108 MMHG | HEART RATE: 70 BPM

## 2021-07-02 VITALS
WEIGHT: 177.6 LBS | TEMPERATURE: 97.5 F | HEIGHT: 63 IN | DIASTOLIC BLOOD PRESSURE: 52 MMHG | HEART RATE: 98 BPM | BODY MASS INDEX: 31.47 KG/M2 | SYSTOLIC BLOOD PRESSURE: 102 MMHG

## 2021-07-02 VITALS
HEIGHT: 63 IN | TEMPERATURE: 97.6 F | SYSTOLIC BLOOD PRESSURE: 109 MMHG | WEIGHT: 171.2 LBS | HEART RATE: 81 BPM | DIASTOLIC BLOOD PRESSURE: 66 MMHG | BODY MASS INDEX: 30.33 KG/M2

## 2021-07-02 VITALS
TEMPERATURE: 97.2 F | BODY MASS INDEX: 31.36 KG/M2 | WEIGHT: 177 LBS | HEART RATE: 72 BPM | HEIGHT: 63 IN | SYSTOLIC BLOOD PRESSURE: 106 MMHG | DIASTOLIC BLOOD PRESSURE: 63 MMHG

## 2021-07-02 VITALS
WEIGHT: 168 LBS | HEART RATE: 87 BPM | SYSTOLIC BLOOD PRESSURE: 107 MMHG | HEIGHT: 63 IN | TEMPERATURE: 98.1 F | DIASTOLIC BLOOD PRESSURE: 57 MMHG | BODY MASS INDEX: 29.77 KG/M2

## 2021-07-02 VITALS
TEMPERATURE: 96.5 F | HEART RATE: 89 BPM | DIASTOLIC BLOOD PRESSURE: 65 MMHG | SYSTOLIC BLOOD PRESSURE: 111 MMHG | HEIGHT: 63 IN | WEIGHT: 178.6 LBS | BODY MASS INDEX: 31.64 KG/M2

## 2021-07-06 ENCOUNTER — OFFICE VISIT (OUTPATIENT)
Dept: FAMILY MEDICINE CLINIC | Age: 31
End: 2021-07-06

## 2021-07-06 VITALS
DIASTOLIC BLOOD PRESSURE: 49 MMHG | WEIGHT: 176 LBS | BODY MASS INDEX: 33.25 KG/M2 | TEMPERATURE: 98.4 F | HEART RATE: 76 BPM | SYSTOLIC BLOOD PRESSURE: 98 MMHG

## 2021-07-06 DIAGNOSIS — R10.2 PELVIC PAIN: Primary | ICD-10-CM

## 2021-07-06 PROCEDURE — 99213 OFFICE O/P EST LOW 20 MIN: CPT | Performed by: NURSE PRACTITIONER

## 2021-07-06 NOTE — ASSESSMENT & PLAN NOTE
Reviewed hospital records   Reviewed her discharge summary and nothing on there regarding swimming,.  Going to [facility].  Will contact his office and then let her know at [phone]  If anything changes to either let our office her GYN know.  Continue the current restrictions she has for up to 6 weeks as directed.

## 2021-07-06 NOTE — PROGRESS NOTES
Hanna Matos presents to [facility].    Chief Complaint:  Hospital Follow Up Visit (post-op follow up for total vaginal hysterectomy )         History of Present Illness:  Pelvic pain/ she has listed a history of endometriosis, sees Dr Bo  follow up / [facility]  Admitted 2021, surgery, discharged 21  She said she is no longer taking pain rx.  Wants to swim and does not know if she can swim.  She [facility] [place] [facility]. She says she does not have a follow up with GYN, she was told to just follow up with PCP.  She was told to not lift over 10 pounds for 6 weeks and no sexual activity, these are the only restrictions.    SAB2    Covid screen in hospital was negative / has not had covid and does not want covid vaccines.   She is not currently working.         Review of Systems:  Review of Systems   Constitutional: Negative for fatigue and fever.   Respiratory: Negative for cough and shortness of breath.    Cardiovascular: Negative for chest pain, palpitations and leg swelling.   Gastrointestinal: Negative for constipation.   Genitourinary: Negative for dysuria.   Neurological: Negative for numbness.          Vital Signs:   BP 98/49 (BP Location: Left arm, Patient Position: Sitting, Cuff Size: Large Adult)   Pulse 76   Temp 98.4 °F (36.9 °C) (Oral)   Wt 79.8 kg (176 lb)   BMI 33.25 kg/m²       Physical Exam:  Physical Exam  Vitals reviewed.   Constitutional:       General: She is not in acute distress.     Appearance: Normal appearance.   Cardiovascular:      Rate and Rhythm: Normal rate and regular rhythm.      Heart sounds: Normal heart sounds. No murmur heard.     Pulmonary:      Effort: Pulmonary effort is normal. No respiratory distress.      Breath sounds: Normal breath sounds.   Abdominal:      Tenderness: There is abdominal tenderness (in low abd, no rebound or guarding ).   Musculoskeletal:      Comments: No CVA tenderness    Neurological:       Mental Status: She is alert.   Psychiatric:         Mood and Affect: Mood normal.         Behavior: Behavior normal.         Result Review      The following data was reviewed by: TRINI Fontanez on 07/06/2021:    Results for orders placed or performed during the hospital encounter of 06/25/21   CBC Auto Differential    Specimen: Blood   Result Value Ref Range    WBC 8.59 3.40 - 10.80 10*3/mm3    RBC 4.27 3.77 - 5.28 10*6/mm3    Hemoglobin 13.4 12.0 - 15.9 g/dL    Hematocrit 40.2 34.0 - 46.6 %    MCV 94.1 79.0 - 97.0 fL    MCH 31.4 26.6 - 33.0 pg    MCHC 33.3 31.5 - 35.7 g/dL    RDW 12.3 12.3 - 15.4 %    RDW-SD 42.5 37.0 - 54.0 fl    MPV 9.7 6.0 - 12.0 fL    Platelets 195 140 - 450 10*3/mm3    Neutrophil % 53.4 42.7 - 76.0 %    Lymphocyte % 34.5 19.6 - 45.3 %    Monocyte % 7.5 5.0 - 12.0 %    Eosinophil % 3.8 0.3 - 6.2 %    Basophil % 0.6 0.0 - 1.5 %    Immature Grans % 0.2 0.0 - 0.5 %    Neutrophils, Absolute 4.59 1.70 - 7.00 10*3/mm3    Lymphocytes, Absolute 2.96 0.70 - 3.10 10*3/mm3    Monocytes, Absolute 0.64 0.10 - 0.90 10*3/mm3    Eosinophils, Absolute 0.33 0.00 - 0.40 10*3/mm3    Basophils, Absolute 0.05 0.00 - 0.20 10*3/mm3    Immature Grans, Absolute 0.02 0.00 - 0.05 10*3/mm3    nRBC 0.0 0.0 - 0.2 /100 WBC   Pregnancy, Urine - Urine, Clean Catch    Specimen: Urine, Clean Catch   Result Value Ref Range    HCG, Urine QL Negative Negative   Urinalysis without microscopic (no culture) - Urine, Clean Catch    Specimen: Urine, Clean Catch   Result Value Ref Range    Color, UA Yellow Yellow, Straw    Appearance, UA Cloudy (A) Clear    pH, UA 5.5 5.0 - 8.0    Specific Gravity, UA >1.030 (H) 1.005 - 1.030    Glucose, UA Negative Negative    Ketones, UA Negative Negative    Bilirubin, UA Negative Negative    Blood, UA Trace (A) Negative    Protein, UA Trace (A) Negative    Leuk Esterase, UA Negative Negative    Nitrite, UA Negative Negative    Urobilinogen, UA 1.0 E.U./dL 0.2 - 1.0 E.U./dL   Urinalysis,  Microscopic Only - Urine, Clean Catch    Specimen: Urine, Clean Catch   Result Value Ref Range    RBC, UA None Seen None Seen /HPF    WBC, UA 3-5 (A) None Seen /HPF    Bacteria, UA None Seen None Seen /HPF    Squamous Epithelial Cells, UA 7-12 (A) None Seen, 0-2 /HPF    Hyaline Casts, UA None Seen None Seen /LPF    Methodology Manual Light Microscopy    CBC Auto Differential    Specimen: Blood   Result Value Ref Range    WBC 12.95 (H) 3.40 - 10.80 10*3/mm3    RBC 3.65 (L) 3.77 - 5.28 10*6/mm3    Hemoglobin 11.5 (L) 12.0 - 15.9 g/dL    Hematocrit 34.7 34.0 - 46.6 %    MCV 95.1 79.0 - 97.0 fL    MCH 31.5 26.6 - 33.0 pg    MCHC 33.1 31.5 - 35.7 g/dL    RDW 12.3 12.3 - 15.4 %    RDW-SD 43.0 37.0 - 54.0 fl    MPV 10.2 6.0 - 12.0 fL    Platelets 181 140 - 450 10*3/mm3    Neutrophil % 73.2 42.7 - 76.0 %    Lymphocyte % 19.0 (L) 19.6 - 45.3 %    Monocyte % 6.3 5.0 - 12.0 %    Eosinophil % 0.8 0.3 - 6.2 %    Basophil % 0.3 0.0 - 1.5 %    Immature Grans % 0.4 0.0 - 0.5 %    Neutrophils, Absolute 9.48 (H) 1.70 - 7.00 10*3/mm3    Lymphocytes, Absolute 2.46 0.70 - 3.10 10*3/mm3    Monocytes, Absolute 0.81 0.10 - 0.90 10*3/mm3    Eosinophils, Absolute 0.11 0.00 - 0.40 10*3/mm3    Basophils, Absolute 0.04 0.00 - 0.20 10*3/mm3    Immature Grans, Absolute 0.05 0.00 - 0.05 10*3/mm3    nRBC 0.0 0.0 - 0.2 /100 WBC   Tissue Pathology Exam    Specimen: Uterus with Cervix; Tissue   Result Value Ref Range    Case Report       Surgical Pathology Report                         Case: [id number]                                  Authorizing Provider:  Que Bo MD       Collected:           06/25/2021 10:01 AM          Ordering Location:     [facility] MAIN Received:            06/25/2021 12:53 PM                                 OR                                                                           Pathologist:           Keli Alex DO                                                       Specimen:    Uterus  with Cervix, UTERUS AND CERVIX                                                      Clinical Information      Final Diagnosis       Uterus and cervix, hysterectomy:   - Cervix:  Reactive/reparative changes   - Endometrium:  Proliferative endometrium    - Myometrium:  Adenomyosis   - Serosa:  No significant pathologic change        Gross Description       Uterus and cervix: Received in formalin is a pink to tan pear-shaped uterus with attached cervix measuring 9 x 5.5 x 4.6 cm weighing 99 g.  The ectocervix is pink smooth and glistening.  Margins around the cervix and lower uterine segment are inked in black and the cervix is radially sectioned.  Sections through the cervix are grossly unremarkable.  The myometrium is pink to light tan and trabeculated without noted mass or lesion.  The endometrial cavity is triangular and lined with pink to light tan endometrium measuring 0.4 cm in thickness.  Rep 1A-1G.  1A-cervix 12-3 o'clock, 1B-cervix 3-6 o'clock, 1C-cervix 6-9 o'clock, 1D-cervix 9-12 o'clock, 1E-full-thickness anterior wall and uterus, 1F-full-thickness posterior wall of the uterus and 1G-additional portions of serosa from the uterine base.  CRE      Microscopic Description                 Assessment and Plan:          Diagnoses and all orders for this visit:    1. Pelvic pain (Primary)  Assessment & Plan:  Reviewed hospital records   Reviewed her discharge summary and nothing on there regarding swimming,.  Going to [facility].  Will contact his office and then let her know at [phone]  If anything changes to either let our office her GYN know.  Continue the current restrictions she has for up to 6 weeks as directed.           Follow Up {Wrapup  Communications :23}  Return if symptoms worsen or fail to improve.  Patient was given instructions and counseling regarding her condition or for health maintenance advice. Please see specific information pulled into the AVS if appropriate.

## 2021-07-07 ENCOUNTER — TELEPHONE (OUTPATIENT)
Dept: FAMILY MEDICINE CLINIC | Age: 31
End: 2021-07-07

## 2021-07-07 NOTE — TELEPHONE ENCOUNTER
Dr Bo did her hysterectomy on 06/25/21. Pt wants to know when she could go swimming. Dr Bo's office said not until after her 6 week check up with them.   Lmtrc to inform pt.

## 2021-07-07 NOTE — TELEPHONE ENCOUNTER
----- Message from Faye Castillo LPN sent at 7/7/2021  9:29 AM EDT -----  Regarding: FW: swimming      ----- Message -----  From: Jimena Olson APRN  Sent: 7/6/2021   3:37 PM EDT  To: Faye Castillo LPN  Subject: swimming                                         Call dr qureshi  Re swimming after her hysterectomy, call pt, number in plan

## 2021-08-23 ENCOUNTER — OFFICE VISIT (OUTPATIENT)
Dept: FAMILY MEDICINE CLINIC | Age: 31
End: 2021-08-23

## 2021-08-23 VITALS
SYSTOLIC BLOOD PRESSURE: 107 MMHG | WEIGHT: 173.6 LBS | DIASTOLIC BLOOD PRESSURE: 74 MMHG | HEIGHT: 61 IN | TEMPERATURE: 98 F | HEART RATE: 94 BPM | BODY MASS INDEX: 32.77 KG/M2

## 2021-08-23 DIAGNOSIS — M25.571 ACUTE RIGHT ANKLE PAIN: ICD-10-CM

## 2021-08-23 DIAGNOSIS — L05.91 PILONIDAL CYST: Primary | ICD-10-CM

## 2021-08-23 PROCEDURE — 99213 OFFICE O/P EST LOW 20 MIN: CPT | Performed by: NURSE PRACTITIONER

## 2021-08-23 RX ORDER — IBUPROFEN 800 MG/1
800 TABLET ORAL EVERY 8 HOURS PRN
COMMUNITY
End: 2021-10-18

## 2021-08-23 RX ORDER — HYDROCODONE BITARTRATE AND ACETAMINOPHEN 5; 325 MG/1; MG/1
1 TABLET ORAL EVERY 4 HOURS PRN
COMMUNITY
End: 2021-10-18

## 2021-08-23 RX ORDER — CEPHALEXIN 500 MG/1
500 CAPSULE ORAL 4 TIMES DAILY
COMMUNITY
End: 2021-10-18

## 2021-08-23 RX ORDER — FLUCONAZOLE 150 MG/1
TABLET ORAL
Qty: 2 TABLET | Refills: 0 | Status: SHIPPED | OUTPATIENT
Start: 2021-08-23 | End: 2021-09-29

## 2021-08-23 NOTE — PROGRESS NOTES
"Chief Complaint  Cyst (seen @ [facility] 8/21/21, Pilonidal Abcess) and Ankle Pain (right)    Subjective          Hanna Matos presents to [facility] originally for pilonidal cyst, but she ended up going to [facility] ED and had it drained on the 21st. She was rx ibuprofen, norco and keflex. Has an appt today at 1 with general sx. There is packing and significant other changed dressing and it is pulling on her skin. She also rolled right ankle 2 weeks coming of son's birthday slide. She states it only hurts when turning foot inward. Does not hurt with ambulation. Does have ace wrap at home. Has not been using ice. Pt wanting diflucan d/t recurring yeast infection with abx use.           Objective   Vital Signs:   /74 (BP Location: Right arm, Patient Position: Standing, Cuff Size: Large Adult)   Pulse 94   Temp 98 °F (36.7 °C) (Oral)   Ht 154.9 cm (60.98\")   Wt 78.7 kg (173 lb 9.6 oz)   BMI 32.82 kg/m²     Physical Exam  Vitals reviewed.   Constitutional:       Appearance: Normal appearance. She is well-developed.   HENT:      Head: Normocephalic and atraumatic.   Eyes:      Conjunctiva/sclera: Conjunctivae normal.      Pupils: Pupils are equal, round, and reactive to light.   Cardiovascular:      Rate and Rhythm: Normal rate.      Heart sounds: Normal heart sounds.   Pulmonary:      Effort: Pulmonary effort is normal. No respiratory distress.      Breath sounds: Normal breath sounds.   Musculoskeletal:         General: No swelling or tenderness. Normal range of motion.      Cervical back: Full passive range of motion without pain.      Right lower leg: No edema.      Left lower leg: No edema.   Skin:     General: Skin is warm and dry.      Comments: pilonidal cyst to top of gluteal cleft. Tenderness noted around area. Packing present with purulent drainage. No surrounding erythema.    Neurological:      Mental Status: She is alert and oriented to person, place, and time. "   Psychiatric:         Mood and Affect: Mood and affect normal.         Behavior: Behavior normal.         Thought Content: Thought content normal.         Judgment: Judgment normal.          Result Review :              Assessment and Plan    Diagnoses and all orders for this visit:    1. Pilonidal cyst (Primary)  Comments:  complete rx abx from ED. Go to sx appt as scheduled today at 1. f/u with pcp as needed. Bandage changed in office with nonadherent dressing and ABD pad.     2. Acute right ankle pain  Comments:  declined ankle xray. Has ace wrap at home. Use ibuprofen for discomfort. Ice and elevate. If pain continues or worsens, pt to call office and xray can be ordere    Other orders  -     fluconazole (Diflucan) 150 MG tablet; Take 1 tablet po once. May repeat in 72 hours in symptoms persist.  Dispense: 2 tablet; Refill: 0    Pt v/u and had no further questions upon d/c.     Follow Up    Return if symptoms worsen or fail to improve.  Patient was given instructions and counseling regarding her condition or for health maintenance advice. Please see specific information pulled into the AVS if appropriate.

## 2021-08-30 ENCOUNTER — TELEPHONE (OUTPATIENT)
Dept: FAMILY MEDICINE CLINIC | Age: 31
End: 2021-08-30

## 2021-09-01 NOTE — TELEPHONE ENCOUNTER
I did see her in July 2021, she has seen BRITTNEE Manuel recently, I addressed her use of cross bow permit to hunt last year 8-2020 for her chronic back pain, I did complete form for a year.

## 2021-09-17 ENCOUNTER — TELEPHONE (OUTPATIENT)
Dept: FAMILY MEDICINE CLINIC | Age: 31
End: 2021-09-17

## 2021-09-17 ENCOUNTER — CLINICAL SUPPORT (OUTPATIENT)
Dept: FAMILY MEDICINE CLINIC | Age: 31
End: 2021-09-17

## 2021-09-17 DIAGNOSIS — Z20.822 CLOSE EXPOSURE TO COVID-19 VIRUS: ICD-10-CM

## 2021-09-17 DIAGNOSIS — Z20.822 CLOSE EXPOSURE TO COVID-19 VIRUS: Primary | ICD-10-CM

## 2021-09-17 PROCEDURE — 87635 SARS-COV-2 COVID-19 AMP PRB: CPT | Performed by: FAMILY MEDICINE

## 2021-09-17 PROCEDURE — 99213 OFFICE O/P EST LOW 20 MIN: CPT | Performed by: FAMILY MEDICINE

## 2021-09-17 NOTE — TELEPHONE ENCOUNTER
Pt said her son tested positive for covid 5 days ago. She is needing a test for exposure. Dr Peña approved a PCR test. He said if she were to develop symptoms she would need to be seen. Pt inf.

## 2021-09-17 NOTE — TELEPHONE ENCOUNTER
Hub staff attempted to follow warm transfer process and was unsuccessful   Caller: Hanna Matos    Relationship: Self    Best call back number: [phone]    What is the best time to reach you: ANYTIME    Who are you requesting to speak with (clinical staff, provider,  specific staff member): COLE BULLOCK    What was the call regarding: PATIENT RETURNED CALL TO OFFICE.     Do you require a callback: YES

## 2021-09-17 NOTE — TELEPHONE ENCOUNTER
Caller: Hanna Matos    Relationship to patient: Self    Best call back number: [phone]    Patient is needing: COVID TEST    SON TESTED POSITIVE

## 2021-09-18 LAB — SARS-COV-2 N GENE RESP QL NAA+PROBE: NOT DETECTED

## 2021-09-20 ENCOUNTER — TELEPHONE (OUTPATIENT)
Dept: FAMILY MEDICINE CLINIC | Age: 31
End: 2021-09-20

## 2021-09-27 PROBLEM — Z20.822 CLOSE EXPOSURE TO COVID-19 VIRUS: Status: ACTIVE | Noted: 2021-09-27

## 2021-09-28 NOTE — PROGRESS NOTES
Chief Complaint  No chief complaint on file.  LOOSE COUGH, EXPOSED TO COVID   Subjective          Hanna Matos presents to [facility]  --STUFFY NOSE AND LOOSE COUGH FOR 3 DAYS.  NO COVID EXPOSURE THAT SHE IS AWARE OF        No Known Allergies     Health Maintenance Due   Topic Date Due   • ANNUAL PHYSICAL  Never done   • Pneumococcal Vaccine 0-64 (1 of 2 - PPSV23) Never done   • COVID-19 Vaccine (1) Never done   • HEPATITIS C SCREENING  Never done   • PAP SMEAR  Never done        Current Outpatient Medications on File Prior to Visit   Medication Sig   • cephalexin (KEFLEX) 500 MG capsule Take 500 mg by mouth 4 (Four) Times a Day.   • docusate sodium (Colace) 100 MG capsule Take 1 capsule by mouth Daily.   • HYDROcodone-acetaminophen (NORCO) 5-325 MG per tablet Take 1 tablet by mouth Every 4 (Four) Hours As Needed.   • ibuprofen (ADVIL,MOTRIN) 600 MG tablet Take 1 tablet by mouth Every 6 (Six) Hours As Needed for Mild Pain .   • ibuprofen (ADVIL,MOTRIN) 800 MG tablet Take 800 mg by mouth Every 8 (Eight) Hours As Needed for Mild Pain .   • [DISCONTINUED] fluconazole (Diflucan) 150 MG tablet Take 1 tablet po once. May repeat in 72 hours in symptoms persist.     No current facility-administered medications on file prior to visit.       Immunization History   Administered Date(s) Administered   • DTaP 01/10/1991, 05/09/1991, 07/11/1991, 03/25/1992, 05/01/1995   • Flu Vaccine Quad PF >36MO 11/22/2002, 03/04/2019   • HPV Quadrivalent 03/07/2008, 05/01/2008, 09/05/2008   • Hepatitis B 08/29/2001, 05/17/2002   • HiB 05/09/1991, 07/11/1991, 09/11/1991, 03/25/1992   • IPV 01/10/1991, 05/09/1991, 03/25/1992, 05/01/1995   • MMR 05/25/1992, 08/29/2001   • Meningococcal Conjugate 09/13/2002   • Td 08/06/2002   • Tdap 08/25/2015       Review of Systems   Constitutional: Negative for activity change, appetite change, chills, fatigue and fever.   HENT: Positive for congestion. Negative for ear  pain, hearing loss, rhinorrhea and sore throat.    Eyes: Negative for blurred vision and discharge.   Respiratory: Positive for cough. Negative for shortness of breath.    Cardiovascular: Negative for chest pain, palpitations and leg swelling.   Gastrointestinal: Negative for abdominal pain, constipation, diarrhea, nausea and vomiting.   Genitourinary: Negative for dysuria and hematuria.   Musculoskeletal: Negative for arthralgias and myalgias.   Neurological: Negative for headache.        Objective     There were no vitals taken for this visit.      Physical Exam  Vitals and nursing note reviewed.   Constitutional:       General: She is not in acute distress.     Appearance: Normal appearance.   HENT:      Right Ear: Tympanic membrane normal.      Left Ear: Tympanic membrane normal.      Mouth/Throat:      Pharynx: Oropharynx is clear.   Eyes:      Conjunctiva/sclera: Conjunctivae normal.   Cardiovascular:      Rate and Rhythm: Normal rate and regular rhythm.      Heart sounds: Normal heart sounds. No murmur heard.     Pulmonary:      Effort: Pulmonary effort is normal.      Breath sounds: Normal breath sounds.   Abdominal:      Palpations: Abdomen is soft.      Tenderness: There is no abdominal tenderness.   Musculoskeletal:         General: No swelling.      Cervical back: Neck supple.      Right lower leg: No edema.      Left lower leg: No edema.   Lymphadenopathy:      Cervical: No cervical adenopathy.   Neurological:      General: No focal deficit present.      Mental Status: She is alert.      Cranial Nerves: No cranial nerve deficit.      Coordination: Coordination normal.      Gait: Gait normal.   Psychiatric:         Mood and Affect: Mood normal.         Behavior: Behavior normal.         Result Review :                             Assessment and Plan      Diagnoses and all orders for this visit:    1. Close exposure to COVID-19 virus  Assessment & Plan:  COVID TEST IS NEGATIVE.  POSSIBLE BRONCHITIS.  WILL  COVER AS NOTED.  FLUIDS, REST, OTC MEDS PRN.   CONSIDER FURTHER EVAL IF SYMPTOM PERSIST, WORSEN OR RECUR     Orders:  -     COVID-19,CEPHEID/CARI/BDMAX,COR/SERGEY/PAD/CARLY IN-HOUSE(OR EMERGENT/ADD-ON),NP SWAB IN TRANSPORT MEDIA 3-4 HR TAT, RT-PCR - Swab, Nasopharynx          Follow Up     Return if symptoms worsen or fail to improve.    Patient was given instructions and counseling regarding her condition or for health maintenance advice. Please see specific information pulled into the AVS if appropriate.

## 2021-09-28 NOTE — ASSESSMENT & PLAN NOTE
COVID TEST IS NEGATIVE.  POSSIBLE BRONCHITIS.  WILL COVER AS NOTED.  FLUIDS, REST, OTC MEDS PRN.   CONSIDER FURTHER EVAL IF SYMPTOM PERSIST, WORSEN OR RECUR

## 2021-09-29 ENCOUNTER — TELEPHONE (OUTPATIENT)
Dept: FAMILY MEDICINE CLINIC | Age: 31
End: 2021-09-29

## 2021-09-29 DIAGNOSIS — B37.31 VAGINAL YEAST INFECTION: Primary | ICD-10-CM

## 2021-09-29 RX ORDER — FLUCONAZOLE 150 MG/1
150 TABLET ORAL ONCE
Qty: 1 TABLET | Refills: 0 | Status: SHIPPED | OUTPATIENT
Start: 2021-09-29 | End: 2021-09-29

## 2021-09-29 NOTE — TELEPHONE ENCOUNTER
Caller: Hanna Matos    Relationship to patient: Self    Best call back number: [phone]    Patient is needing: PATIENT CALLED STATING SHE TESTED POSITIVE FOR COVID ON 09/24/2021 AND SHE STATED SHE IS HAVING CONGESTION, EARS POPPING, SINUS PRESSURE AND NOSE STUFFED UP. PATIENT ALSO STATED SHE HAS A YEAST INFECTIONS FROM THE ANTIBIOTICS, SHE IS HAVING VAGINAL ITCHING AND INFLAMMATION. PATIENT WOULD LIKE A CALL BACK TO SPEAK TO CLINICAL STAFF ABOUT WHAT SHE CAN DO AND IF HER PCP CAN SEND IN SOME ANTIBIOTICS FOR HER. PLEASE ADVISE THANK YOU.         [facility] - [place], [place] - [address]  - [phone] [facility] [phone]   [phone]

## 2021-09-29 NOTE — TELEPHONE ENCOUNTER
Can have a diflucan 150 mg X 1 if has yeast inf from [facility], but if having SOA or fever, related to covid then needs further triaging

## 2021-09-29 NOTE — TELEPHONE ENCOUNTER
Pt inf, med sent to pharmacy. Advised of soa, blue lips or chest pain, she would need to go to ER.  Pt said no she is not having any of that.

## 2021-09-29 NOTE — TELEPHONE ENCOUNTER
Pt said she tested positive for covid 09/24/21 at [facility] in [place]. She is having all the symptoms list below. Advised Tylenol for fever or body aches, mucinex or allergy med for sinus congestion or drainage. She said all of her other family members have already had it.   She said she finished a antibiotic her surgeon had given her for a cyst. She now has vaginal itching and inflamation. She is wanting something for that.  Please advise.  Requested records from [facility].

## 2021-10-18 ENCOUNTER — OFFICE VISIT (OUTPATIENT)
Dept: FAMILY MEDICINE CLINIC | Age: 31
End: 2021-10-18

## 2021-10-18 VITALS
HEART RATE: 77 BPM | SYSTOLIC BLOOD PRESSURE: 101 MMHG | WEIGHT: 137 LBS | HEIGHT: 61 IN | BODY MASS INDEX: 25.86 KG/M2 | DIASTOLIC BLOOD PRESSURE: 61 MMHG | OXYGEN SATURATION: 99 %

## 2021-10-18 DIAGNOSIS — N76.0 ACUTE VAGINITIS: ICD-10-CM

## 2021-10-18 DIAGNOSIS — M65.271 CALCIFIC TENDONITIS OF FOOT, RIGHT: Primary | ICD-10-CM

## 2021-10-18 DIAGNOSIS — L05.01 PILONIDAL CYST WITH ABSCESS: ICD-10-CM

## 2021-10-18 PROCEDURE — 99213 OFFICE O/P EST LOW 20 MIN: CPT | Performed by: NURSE PRACTITIONER

## 2021-10-18 RX ORDER — DICLOFENAC SODIUM 75 MG/1
75 TABLET, DELAYED RELEASE ORAL 2 TIMES DAILY
Qty: 60 TABLET | Refills: 0 | Status: SHIPPED | OUTPATIENT
Start: 2021-10-18 | End: 2021-11-17

## 2021-10-18 RX ORDER — FLUCONAZOLE 150 MG/1
150 TABLET ORAL ONCE
Qty: 2 TABLET | Refills: 0 | Status: SHIPPED | OUTPATIENT
Start: 2021-10-18 | End: 2021-10-18

## 2021-10-18 RX ORDER — LEVOFLOXACIN 500 MG/1
TABLET, FILM COATED ORAL
COMMUNITY
Start: 2021-10-13 | End: 2022-03-01

## 2021-10-18 NOTE — PROGRESS NOTES
Chief Complaint  Hanna Matos presents to [facility] for Foot Pain (patient complains of right foot pain hurt in august on water slide but had previous injury 9 years ago was drug by yannick with same foot  )    Subjective          Right foot pain  - started 2 weeks ago Went down a water slide and feels like has been hurting ever since that time.  She did have a foot injury about 9 years ago to that same foot.  Has not been taking anything over the counter to help with the pain  Pain is worse when internally rotating the foot at the level of the ankle    Also, she has been having problem with yeast infection. She is on antibiotic for pilonidal cyst        Review of Systems   Constitutional: Negative for fatigue and fever.   Respiratory: Negative for shortness of breath.    Cardiovascular: Negative for chest pain.   Genitourinary: Positive for vaginal discharge (itching).   Musculoskeletal: Positive for arthralgias (right foot pain) and gait problem (right foot pain).   Psychiatric/Behavioral: Negative for depressed mood. The patient is not nervous/anxious.          No Known Allergies   Past Medical History:   Diagnosis Date   • Acute maxillary sinusitis, unspecified    • Acute vaginitis    • Amenorrhea, unspecified    • Anemia    • Asthma     NO INHALERS   • Carpal tunnel syndrome     RIGHT/SURGERY 02/2021   • Cervical cancer (HCC)    • Cervicalgia    • Endometriosis     ABN CELLS, CYSTS   • Frequency of micturition    • Localized swelling, mass and lump, head    • Low back pain    • Migraine with aura and without status migrainosus, not intractable    • Noninflammatory disorder of vagina, unspecified    • Other mixed anxiety disorders    • Pain in right shoulder    • Pain in thoracic spine    • Pilonidal cyst with abscess    • Pneumonia    • PONV (postoperative nausea and vomiting)    • Recurrent UTI     diagnosed at age 4     Current Outpatient Medications   Medication  Sig Dispense Refill   • levoFLOXacin (LEVAQUIN) 500 MG tablet Take 1 tablet BY MOUTH EVERY 24 hours     • diclofenac (VOLTAREN) 75 MG EC tablet Take 1 tablet by mouth 2 (Two) Times a Day for 30 days. 60 tablet 0   • fluconazole (Diflucan) 150 MG tablet Take 1 tablet by mouth 1 (One) Time for 1 dose. May repeat in 1 week if ineffective 2 tablet 0     No current facility-administered medications for this visit.     Past Surgical History:   Procedure Laterality Date   • CARPAL TUNNEL RELEASE Right 1/15/2021    Procedure: Right Carpal Tunnel Release;  Surgeon: Osvaldo Strong MD;  Location: [facility]  Service: Orthopedics;  Laterality: Right;   • CERVICAL BIOPSY  W/ LOOP ELECTRODE EXCISION     • PILONIDAL CYST DRAINAGE     • TUBAL ABDOMINAL LIGATION     • VAGINAL HYSTERECTOMY N/A 2021    Procedure: TOTAL VAGINAL HYSTERECTOMY;  Surgeon: Que Bo MD;  Location: [facility];  Service: Gynecology;  Laterality: N/A;      Social History     Tobacco Use   • Smoking status: Former Smoker     Packs/day: 0.50     Years: 22.00     Pack years: 11.00     Types: Cigarettes     Quit date: 2021     Years since quittin.0   • Smokeless tobacco: Never Used   Vaping Use   • Vaping Use: Every day   • Substances: Nicotine, Flavoring   • Devices: Disposable   Substance Use Topics   • Alcohol use: Never   • Drug use: Never     Family History   Problem Relation Age of Onset   • Heart disease Other    • Diabetes Other    • Lung cancer Other    • Lymphoma Other    • Breast cancer Other    • Hypertension Other    • ADD / ADHD Other    • Lymphoma Father          at age 52   • Lung cancer Maternal Grandfather    • Heart failure Paternal Grandfather    • Diabetes type II Paternal Grandfather    • Malig Hyperthermia Neg Hx      Health Maintenance Due   Topic Date Due   • ANNUAL PHYSICAL  Never done   • Pneumococcal Vaccine 0-64 (1 of 2 - PPSV23) Never done   • COVID-19 Vaccine (1) Never done   • HEPATITIS C SCREENING   "Never done   • PAP SMEAR  Never done   • INFLUENZA VACCINE  08/01/2021      Immunization History   Administered Date(s) Administered   • DTaP 01/10/1991, 05/09/1991, 07/11/1991, 03/25/1992, 05/01/1995   • Flu Vaccine Quad PF >36MO 11/22/2002, 03/04/2019   • HPV Quadrivalent 03/07/2008, 05/01/2008, 09/05/2008   • Hepatitis B 08/29/2001, 05/17/2002   • HiB 05/09/1991, 07/11/1991, 09/11/1991, 03/25/1992   • IPV 01/10/1991, 05/09/1991, 03/25/1992, 05/01/1995   • MMR 05/25/1992, 08/29/2001   • Meningococcal Conjugate 09/13/2002   • Td 08/06/2002   • Tdap 08/25/2015        Objective     Vitals:    10/18/21 1141   BP: 101/61   BP Location: Left arm   Patient Position: Sitting   Cuff Size: Adult   Pulse: 77   SpO2: 99%   Weight: 62.1 kg (137 lb)   Height: 154.9 cm (61\")     Body mass index is 25.89 kg/m².     Physical Exam  Constitutional:       General: She is not in acute distress.     Appearance: Normal appearance.   HENT:      Head: Normocephalic.   Cardiovascular:      Rate and Rhythm: Normal rate and regular rhythm.   Pulmonary:      Effort: Pulmonary effort is normal.      Breath sounds: Normal breath sounds.   Musculoskeletal:         General: Normal range of motion.      Right foot: Tenderness (with internal rotation across the lateral and dorsal surface ) present.   Neurological:      General: No focal deficit present.      Mental Status: She is alert and oriented to person, place, and time.   Psychiatric:         Mood and Affect: Mood normal.         Behavior: Behavior normal.           Result Review :                               Assessment and Plan      Diagnoses and all orders for this visit:    1. Calcific tendonitis of foot, right (Primary)  Comments:  Will treat for tendonitis -Recommend that she avoid over extension of the foot;  supportive shoes;  if no improvement, referral to podiatry   Orders:  -     diclofenac (VOLTAREN) 75 MG EC tablet; Take 1 tablet by mouth 2 (Two) Times a Day for 30 days.  " Dispense: 60 tablet; Refill: 0    2. Acute vaginitis  Comments:  currently on antibiotic - treat for acute possible yeast - follow up if not improved with treatment   Orders:  -     fluconazole (Diflucan) 150 MG tablet; Take 1 tablet by mouth 1 (One) Time for 1 dose. May repeat in 1 week if ineffective  Dispense: 2 tablet; Refill: 0    3. Pilonidal cyst with abscess  Comments:  Follow up with surgeon as recommended               Follow Up     Return if symptoms worsen or fail to improve, for Follow up with PCP as recommended.

## 2022-01-10 ENCOUNTER — TELEPHONE (OUTPATIENT)
Dept: FAMILY MEDICINE CLINIC | Age: 32
End: 2022-01-10

## 2022-01-10 ENCOUNTER — OFFICE VISIT (OUTPATIENT)
Dept: FAMILY MEDICINE CLINIC | Age: 32
End: 2022-01-10

## 2022-01-10 VITALS
BODY MASS INDEX: 33.3 KG/M2 | SYSTOLIC BLOOD PRESSURE: 111 MMHG | OXYGEN SATURATION: 98 % | HEART RATE: 82 BPM | WEIGHT: 176.4 LBS | TEMPERATURE: 98.1 F | RESPIRATION RATE: 22 BRPM | DIASTOLIC BLOOD PRESSURE: 57 MMHG | HEIGHT: 61 IN

## 2022-01-10 DIAGNOSIS — K08.89 PAIN, DENTAL: ICD-10-CM

## 2022-01-10 DIAGNOSIS — R05.9 COUGH: Primary | ICD-10-CM

## 2022-01-10 LAB
EXPIRATION DATE: NORMAL
FLUAV AG UPPER RESP QL IA.RAPID: NOT DETECTED
FLUBV AG UPPER RESP QL IA.RAPID: NOT DETECTED
INTERNAL CONTROL: NORMAL
Lab: NORMAL
SARS-COV-2 AG UPPER RESP QL IA.RAPID: NOT DETECTED

## 2022-01-10 PROCEDURE — 87428 SARSCOV & INF VIR A&B AG IA: CPT | Performed by: FAMILY MEDICINE

## 2022-01-10 PROCEDURE — 99213 OFFICE O/P EST LOW 20 MIN: CPT | Performed by: FAMILY MEDICINE

## 2022-01-10 RX ORDER — CLINDAMYCIN HYDROCHLORIDE 300 MG/1
300 CAPSULE ORAL 3 TIMES DAILY
Qty: 30 CAPSULE | Refills: 0 | Status: SHIPPED | OUTPATIENT
Start: 2022-01-10 | End: 2022-01-20

## 2022-01-10 NOTE — PROGRESS NOTES
"Chief Complaint  Earache (Left ear), Cough, and Dental Pain    Subjective          Hanna Matos presents to [facility]  History of Present Illness 31-year-old female, in the office today with a couple days of dental pain and also a cough and some earache in her left ear.  She does use Q-tips in her ears.  Also she has not had any COVID-vaccine series.  She did have COVID active infection several months ago.  Her cough is nonproductive.    Her dental pain has flared.  She has known periodontal disease with bone loss and had seen a dentist over a year ago but she never followed up with them.  She not had any fevers but her teeth hurt mostly on the right side.    Not really taking anything over-the-counter at this juncture.        Review of Systems   Constitutional: Positive for fatigue. Negative for fever.   HENT: Positive for congestion, ear pain, postnasal drip and rhinorrhea. Negative for ear discharge, nosebleeds, sinus pressure, sneezing and sore throat.         Also complaining of dental pain.   Eyes: Negative.    Respiratory: Positive for cough and chest tightness. Negative for shortness of breath.    Cardiovascular: Negative.    Gastrointestinal: Negative.         No Known Allergies    Current Outpatient Medications:       Objective   Vital Signs:   /57 (BP Location: Left arm, Patient Position: Sitting)   Pulse 82   Temp 98.1 °F (36.7 °C)   Resp 22   Ht 154.9 cm (61\")   Wt 80 kg (176 lb 6.4 oz)   SpO2 98%   BMI 33.33 kg/m²     Physical Exam  Constitutional:       Appearance: Normal appearance.   HENT:      Head: Normocephalic and atraumatic.      Nose: Congestion and rhinorrhea present.      Comments: Minimal congestion.     Mouth/Throat:      Mouth: Mucous membranes are dry.      Pharynx: Posterior oropharyngeal erythema present. No oropharyngeal exudate.      Comments: Mild erythema but no plaque or excoriations or edema.  Eyes:      Extraocular Movements: " Extraocular movements intact.      Pupils: Pupils are equal, round, and reactive to light.   Cardiovascular:      Rate and Rhythm: Normal rate and regular rhythm.   Pulmonary:      Effort: Pulmonary effort is normal.      Breath sounds: Normal breath sounds.   Abdominal:      General: Bowel sounds are normal.      Palpations: Abdomen is soft.   Musculoskeletal:      Cervical back: Normal range of motion and neck supple.   Skin:     General: Skin is warm and dry.      Capillary Refill: Capillary refill takes less than 2 seconds.   Neurological:      Mental Status: She is alert.        Result Review :   SARS Antigen   Date Value Ref Range Status   01/10/2022 Not Detected Not Detected Final     Influenza A Antigen APOLINAR   Date Value Ref Range Status   01/10/2022 Not Detected  Final     Influenza B Antigen APOLINAR   Date Value Ref Range Status   01/10/2022 Not Detected  Final                   Assessment and Plan    Diagnoses and all orders for this visit:    1. Cough (Primary)  -     POCT SARS-CoV-2 Antigen APOLINAR + Flu    2. Pain, dental    Patient was informed that her flu and her COVID testing was negative.  Because she is having dental pain I think this is what is referring to her ear pain in either event, started on clindamycin and I advised her to get a dental appointment with her usual dentist as soon as possible for her periodontal disease.  Patient was agreeable to this plan.    Follow Up   No follow-ups on file.  Patient was given instructions and counseling regarding her condition or for health maintenance advice. Please see specific information pulled into the AVS if appropriate.

## 2022-01-27 ENCOUNTER — OFFICE VISIT (OUTPATIENT)
Dept: FAMILY MEDICINE CLINIC | Age: 32
End: 2022-01-27

## 2022-01-27 VITALS
SYSTOLIC BLOOD PRESSURE: 118 MMHG | DIASTOLIC BLOOD PRESSURE: 62 MMHG | BODY MASS INDEX: 33.25 KG/M2 | HEART RATE: 79 BPM | WEIGHT: 176 LBS

## 2022-01-27 DIAGNOSIS — R21 RASH: Primary | ICD-10-CM

## 2022-01-27 PROCEDURE — 99213 OFFICE O/P EST LOW 20 MIN: CPT | Performed by: NURSE PRACTITIONER

## 2022-01-27 RX ORDER — KETOCONAZOLE 20 MG/ML
SHAMPOO TOPICAL 2 TIMES WEEKLY
Qty: 100 ML | Refills: 1 | Status: SHIPPED | OUTPATIENT
Start: 2022-01-27

## 2022-01-27 NOTE — ASSESSMENT & PLAN NOTE
Discussed drying her hair at night and not sleeping with it wet, consider derm referral   Offered covid vaccine, declines

## 2022-01-27 NOTE — PROGRESS NOTES
Hanna Matos presents to [facility].    Chief Complaint:  Psoriasis         History of Present Illness:  Rash  scalp  Symptoms started:2 weeks ago  Areas effected: scalp in general aggravated by  wearing visor at work and cold weather   Remedies tried: cocoanut oil / selsun blue, tea tree oil  Associated symptoms: itches and flakes         Review of Systems:  Review of Systems   Constitutional: Positive for fatigue (worked 60 hours for last few weeks ). Negative for fever.   Respiratory: Negative for cough and shortness of breath.    Cardiovascular: Negative for chest pain, palpitations and leg swelling.   Neurological: Negative for numbness.      PAST MEDICAL HISTORY changes in the last 6 months:         Asthma: dx'd at age 18 mos;     Pneumonia: hospitalized;     Urinary Tract Infections, Recurrent: dx'd at age 4; hospitilized;     Chicken pox     Cervical cancer         GYNECOLOGICAL HISTORY:     miscarriage 1    No problems with menstrual cycles.    Contraception: S/P tubal ligation;    Menarche occurred at age 15 yrs.    (+) hx of abnormal Pap ( she has undergone LEEP ) Sexually Active? yes         CURRENT MEDICAL PROVIDERS:    Obstetrician/Gynecologist: Dr SEALS         CURRENT MEDICAL PROVIDERS:    hosp-- for pylonephritis     miscarrage X1                    Surgical History:       pilonidal cyst 10-   Hysterectomy , Kumar    Bilateral Tubal Ligation    R CTS ;         Family History:         Paternal Grandfather: Congestive Heart Failure (  );  Type 2 Diabetes     Maternal Grandfather: Lung Cancer (  ) Father:  at age 52; Cause of death was lymphoma    ; Positive for Myocardial Infarction;     Mother: Healthy    ; Positive for ADD/ADHD;     Son(s): Healthy; 2 son(s) total         Social History:     Occupation:. [employer]     Marital Status: Engaged     Children: 2 children     Current Outpatient Medications:   •  ketoconazole  (Nizoral) 2 % shampoo, Apply  topically to the appropriate area as directed 2 (Two) Times a Week., Disp: 100 mL, Rfl: 1  •  levoFLOXacin (LEVAQUIN) 500 MG tablet, Take 1 tablet BY MOUTH EVERY 24 hours, Disp: , Rfl:     Vital Signs:   Vitals:    01/27/22 1422   BP: 118/62   BP Location: Right arm   Patient Position: Sitting   Pulse: 79   Weight: 79.8 kg (176 lb)         Physical Exam:  Physical Exam  Vitals reviewed.   Constitutional:       General: She is not in acute distress.     Appearance: Normal appearance.   Cardiovascular:      Rate and Rhythm: Normal rate and regular rhythm.      Heart sounds: Normal heart sounds. No murmur heard.      Pulmonary:      Effort: Pulmonary effort is normal. No respiratory distress.      Breath sounds: Normal breath sounds.   Skin:     Comments: No scalp lesions, some mild flaking, no redness, skin behind ears clear    Neurological:      Mental Status: She is alert.   Psychiatric:         Mood and Affect: Mood normal.         Behavior: Behavior normal.         Result Review      The following data was reviewed by: TRINI Fontanez on 01/27/2022:    Results for orders placed or performed in visit on 01/10/22   POCT SARS-CoV-2 Antigen APOLINAR + Flu    Specimen: Swab   Result Value Ref Range    SARS Antigen Not Detected Not Detected    Influenza A Antigen APOLINAR Not Detected     Influenza B Antigen APOLINAR Not Detected     Internal Control Passed Passed    Lot Number [id number]     Expiration Date 12/16/22                Assessment and Plan:          Diagnoses and all orders for this visit:    1. Rash (Primary)  Assessment & Plan:  Discussed drying her hair at night and not sleeping with it wet, consider derm referral   Offered covid vaccine, declines     Orders:  -     ketoconazole (Nizoral) 2 % shampoo; Apply  topically to the appropriate area as directed 2 (Two) Times a Week.  Dispense: 100 mL; Refill: 1        Follow Up   Return for if not improving, will consider sending her to  derm .  Patient was given instructions and counseling regarding her condition or for health maintenance advice. Please see specific information pulled into the AVS if appropriate.

## 2022-03-01 ENCOUNTER — HOSPITAL ENCOUNTER (OUTPATIENT)
Dept: GENERAL RADIOLOGY | Facility: HOSPITAL | Age: 32
Discharge: HOME | End: 2022-03-01
Admitting: NURSE PRACTITIONER

## 2022-03-01 ENCOUNTER — OFFICE VISIT (OUTPATIENT)
Dept: FAMILY MEDICINE CLINIC | Age: 32
End: 2022-03-01

## 2022-03-01 VITALS
DIASTOLIC BLOOD PRESSURE: 52 MMHG | SYSTOLIC BLOOD PRESSURE: 111 MMHG | HEART RATE: 83 BPM | TEMPERATURE: 98.5 F | BODY MASS INDEX: 32.77 KG/M2 | WEIGHT: 173.6 LBS | HEIGHT: 61 IN

## 2022-03-01 DIAGNOSIS — G89.29 CHRONIC RIGHT SHOULDER PAIN: Primary | ICD-10-CM

## 2022-03-01 DIAGNOSIS — G89.29 CHRONIC RIGHT SHOULDER PAIN: ICD-10-CM

## 2022-03-01 DIAGNOSIS — M25.511 CHRONIC RIGHT SHOULDER PAIN: Primary | ICD-10-CM

## 2022-03-01 DIAGNOSIS — M25.511 CHRONIC RIGHT SHOULDER PAIN: ICD-10-CM

## 2022-03-01 PROCEDURE — 99213 OFFICE O/P EST LOW 20 MIN: CPT | Performed by: NURSE PRACTITIONER

## 2022-03-01 PROCEDURE — 73030 X-RAY EXAM OF SHOULDER: CPT

## 2022-03-01 RX ORDER — NAPROXEN 500 MG/1
500 TABLET ORAL 2 TIMES DAILY WITH MEALS
Qty: 60 TABLET | Refills: 0 | Status: SHIPPED | OUTPATIENT
Start: 2022-03-01 | End: 2022-05-18

## 2022-03-01 RX ORDER — NAPROXEN 500 MG/1
500 TABLET ORAL 2 TIMES DAILY WITH MEALS
Qty: 60 TABLET | Refills: 0 | Status: SHIPPED | OUTPATIENT
Start: 2022-03-01 | End: 2022-03-01

## 2022-03-01 NOTE — ASSESSMENT & PLAN NOTE
Advised covid vaccines, declines  She would prefer naproxen over Ibuprofen, sent to our pharmacy, will get repeat x-ray, reviewed last x-ray in 8-2020, and to rest her shoulder, try to get her on in with ortho again   X-ray,  Nothing acute and no dislocation seen of right shoulder

## 2022-03-01 NOTE — PROGRESS NOTES
Hanna Matos presents to [facility].    Chief Complaint:  Shoulder Pain (right, states on going for a year, states she cannot extend arm all the way and cannot reach above her head )         History of Present Illness:  Joint pain:   Right shoulder pain  Symptoms started: over a year ago but flared in the last 4 days  associated symptoms: right hand is tingling   Treatment tried:Ibuprofen 800, rest, stretching and took a pain rx she was given for her pilonidal surgery, that helped yesterday   (was working at [employer])   Previous ortho :[facility], for CTS  Dg testing : right shoulder x-ray , nothing acute or significant degenerative changes   Right handed,  Went to [facility], no dg testing   Covid reports having several months ago /Covid vaccines: not had     PAST MEDICAL HISTORY changes since : none         Asthma: dx'd at age 18 mos;     Pneumonia: hospitalized;     Urinary Tract Infections, Recurrent: dx'd at age 4; hospitilized;     Chicken pox     Cervical cancer         GYNECOLOGICAL HISTORY:     miscarriage 1    No problems with menstrual cycles.    Contraception: S/P tubal ligation;    Menarche occurred at age 15 yrs.    (+) hx of abnormal Pap ( she has undergone LEEP ) Sexually Active? yes         CURRENT MEDICAL PROVIDERS:    Obstetrician/Gynecologist: Dr BOZENA alegre-- for pylonephritis     miscarrage X1         Surgical History:        pilonidal cyst 10-   Hysterectomy , Kumar    Bilateral Tubal Ligation    R CTS ;         Family History:         Paternal Grandfather: Congestive Heart Failure (  );  Type 2 Diabetes     Maternal Grandfather: Lung Cancer (  )   Father:  at age 52; Cause of death was lymphoma    ; Positive for Myocardial Infarction;     Mother: Healthy    ; Positive for ADD/ADHD;     Son(s): Healthy; 2 son(s) total         Social History:     Occupation:. [employer] (quit yesterday 22)    Marital  "Status: Engaged     Children: 2 children       Review of Systems:  Review of Systems   Constitutional: Negative for fatigue and fever.   Respiratory: Negative for cough and shortness of breath.    Cardiovascular: Negative for chest pain, palpitations and leg swelling.          Current Outpatient Medications:   •  ketoconazole (Nizoral) 2 % shampoo, Apply  topically to the appropriate area as directed 2 (Two) Times a Week., Disp: 100 mL, Rfl: 1  •  naproxen (Naprosyn) 500 MG tablet, Take 1 tablet by mouth 2 (Two) Times a Day With Meals., Disp: 60 tablet, Rfl: 0    Vital Signs:   Vitals:    03/01/22 0831   BP: 111/52   BP Location: Left arm   Patient Position: Sitting   Pulse: 83   Temp: 98.5 °F (36.9 °C)   TempSrc: Oral   Weight: 78.7 kg (173 lb 9.6 oz)   Height: 154.9 cm (61\")   PainSc: 0-No pain         Physical Exam:  Physical Exam  Vitals reviewed.   Constitutional:       General: She is not in acute distress.     Appearance: Normal appearance.   Cardiovascular:      Rate and Rhythm: Normal rate and regular rhythm.      Heart sounds: Normal heart sounds. No murmur heard.      Pulmonary:      Effort: Pulmonary effort is normal. No respiratory distress.      Breath sounds: Normal breath sounds.   Musculoskeletal:      Comments: Limited ROM of right shoulder, pain if any movement attempted, no tenderness to palpation of right shoulder    Neurological:      Mental Status: She is alert.   Psychiatric:         Mood and Affect: Mood normal.         Behavior: Behavior normal.         Result Review      The following data was reviewed by: TRINI Fontanez on 03/01/2022:    Results for orders placed or performed in visit on 01/10/22   POCT SARS-CoV-2 Antigen APOLINAR + Flu    Specimen: Swab   Result Value Ref Range    SARS Antigen Not Detected Not Detected    Influenza A Antigen APOLINAR Not Detected     Influenza B Antigen APOLINAR Not Detected     Internal Control Passed Passed    Lot Number [id number]     Expiration Date " 12/16/22                Assessment and Plan:          Diagnoses and all orders for this visit:    1. Chronic right shoulder pain (Primary)  Assessment & Plan:  Advised covid vaccines, declines  She would prefer naproxen over Ibuprofen, sent to our pharmacy, will get repeat x-ray, reviewed last x-ray in 8-2020, and to rest her shoulder, try to get her on in with ortho again   X-ray,  Nothing acute and no dislocation seen of right shoulder     Orders:  -     XR Shoulder 2+ View Right; Future  -     Discontinue: naproxen (Naprosyn) 500 MG tablet; Take 1 tablet by mouth 2 (Two) Times a Day With Meals.  Dispense: 60 tablet; Refill: 0  -     Ambulatory Referral to Orthopedic Surgery  -     naproxen (Naprosyn) 500 MG tablet; Take 1 tablet by mouth 2 (Two) Times a Day With Meals.  Dispense: 60 tablet; Refill: 0        Follow Up   Return if symptoms worsen or fail to improve.  Patient was given instructions and counseling regarding her condition or for health maintenance advice. Please see specific information pulled into the AVS if appropriate.

## 2022-04-04 ENCOUNTER — OFFICE VISIT (OUTPATIENT)
Dept: ORTHOPEDIC SURGERY | Facility: CLINIC | Age: 32
End: 2022-04-04

## 2022-04-04 VITALS — HEIGHT: 61 IN | BODY MASS INDEX: 33.72 KG/M2 | WEIGHT: 178.6 LBS

## 2022-04-04 DIAGNOSIS — M25.511 CHRONIC RIGHT SHOULDER PAIN: Primary | ICD-10-CM

## 2022-04-04 DIAGNOSIS — R29.898 WEAKNESS OF SHOULDER: ICD-10-CM

## 2022-04-04 DIAGNOSIS — G89.29 CHRONIC RIGHT SHOULDER PAIN: Primary | ICD-10-CM

## 2022-04-04 PROCEDURE — 99214 OFFICE O/P EST MOD 30 MIN: CPT | Performed by: PHYSICIAN ASSISTANT

## 2022-04-04 NOTE — PROGRESS NOTES
"Chief Complaint  Pain of the Right Shoulder    Subjective    History of Present Illness      Hanna Matos is a 31 y.o. female who presents to [facility] for new complaint of  Shoulder Pain: Patient complaints of right shoulder pain. The pain is described as aching, burning and stabbing.  The onset of the pain was gradual, starting about 1 year ago but she reports continued worsening.  Location is anterior. Symptoms are aggravated by lifting items with the arm/lifting the arm in abduction. Symptoms are diminished by rest and NSAIDs. She also reports a snapping noise with certain overhead movements of the shoulder. She has been involved in heavy labor regularly, such as construction.         Objective   Vital Signs:   Ht 154.9 cm (61\")   Wt 81 kg (178 lb 9.6 oz)   BMI 33.75 kg/m²     Physical Exam  Vitals signs and nursing note reviewed.   Constitutional:       Appearance: Normal appearance.   Pulmonary:      Effort: Pulmonary effort is normal.   Skin:     General: Skin is warm and dry.      Capillary Refill: Capillary refill takes less than 2 seconds.   Neurological:      General: No focal deficit present.      Mental Status: He is alert and oriented to person, place, and time. Mental status is at baseline.   Psychiatric:         Mood and Affect: Mood normal.         Behavior: Behavior normal.         Thought Content: Thought content normal.         Judgment: Judgment normal.     Ortho Exam   RIGHT shoulder  Positive for significant tenderness at the anterior aspect of the shoulder and at the insertion of the rotator cuff over the greater tuberosity of the humerus.   Positive for tenderness with palpation of the AC joint.  Soft tissue tenderness is noted.   Slight proximal migration of the humeral head is noted.   Forward flexion is 0-120 degrees, abduction is 0-110 degrees, external rotation is 0-40 degrees.   Positive for decreased strength in abduction and external rotation. "   Crossover adduction test is positive.    Drop arm sign is positive.  Sulcus sign is negative.    Neer test is positive on compression.  The pain level is 7.  There is no evidence of multidirectional instability.       Result Review :   Radiologic studies - see below for interpretation  RIGHT shoulder xrays   4 views were performed at [facility] on 3/1/22. Images were independently viewed and interpreted by myself, my impression as follows:  · Normal AC joint and normal glenohumeral joint, no abnormal findings        PROCEDURE  Procedures           Assessment   Assessment and Plan    Problem List Items Addressed This Visit        Musculoskeletal and Injuries    Right shoulder pain - Primary    Relevant Orders    FL Contrast Injection CT / MRI    MRI shoulder right arthrogram    Weakness of shoulder    Relevant Orders    FL Contrast Injection CT / MRI    MRI shoulder right arthrogram          Follow Up   · Discussion of any imaging in detail. Discussion of orthopaedic goals.  · Risk, benefits, and merits of treatment alternatives reviewed with the patient. Treatment alternatives include: further imaging/testing. On exam there is particularly weakness of the supraspinatus muscle/tendon which raises my suspicion for rotator cuff injury.  · Ice, heat, and/or modalities as beneficial  · To schedule MR Arthrogram of the right shoulder to r/o rotator cuff pathology and to r/o labral issue  · Patient is encouraged to call or return for any issues or concerns.  · Follow up will be based on results of MRI  • Patient was given instructions and counseling regarding her condition or for health maintenance advice. Please see specific information pulled into the AVS if appropriate.     Mejia Ramos PA-C   Date of Encounter: 4/4/2022   Electronically signed by Mejia Ramos PA-C, 04/04/22, 8:04 AM EDT.     EMR Dragon/Transcription disclaimer:  Much of this encounter note is an electronic  transcription/translation of spoken language to printed text. The electronic translation of spoken language may permit erroneous, or at times, nonsensical words or phrases to be inadvertently transcribed; Although I have reviewed the note for such errors, some may still exist.

## 2022-04-04 NOTE — PROGRESS NOTES
Ortho appt: schedule MR Arthrogram of the right shoulder to r/o rotator cuff pathology and to r/o labral issue/ follow up pending these results

## 2022-04-29 ENCOUNTER — HOSPITAL ENCOUNTER (OUTPATIENT)
Dept: INTERVENTIONAL RADIOLOGY/VASCULAR | Facility: HOSPITAL | Age: 32
Discharge: HOME | End: 2022-04-29

## 2022-04-29 ENCOUNTER — HOSPITAL ENCOUNTER (OUTPATIENT)
Dept: MRI IMAGING | Facility: HOSPITAL | Age: 32
Discharge: HOME | End: 2022-04-29

## 2022-04-29 DIAGNOSIS — R29.898 WEAKNESS OF SHOULDER: ICD-10-CM

## 2022-04-29 DIAGNOSIS — M25.511 CHRONIC RIGHT SHOULDER PAIN: ICD-10-CM

## 2022-04-29 DIAGNOSIS — G89.29 CHRONIC RIGHT SHOULDER PAIN: ICD-10-CM

## 2022-04-29 PROCEDURE — 0 GADOBENATE DIMEGLUMINE 529 MG/ML SOLUTION: Performed by: PHYSICIAN ASSISTANT

## 2022-04-29 PROCEDURE — 77002 NEEDLE LOCALIZATION BY XRAY: CPT

## 2022-04-29 PROCEDURE — A9577 INJ MULTIHANCE: HCPCS | Performed by: PHYSICIAN ASSISTANT

## 2022-04-29 PROCEDURE — 73222 MRI JOINT UPR EXTREM W/DYE: CPT

## 2022-04-29 PROCEDURE — 25010000002 IOPAMIDOL 61 % SOLUTION: Performed by: PHYSICIAN ASSISTANT

## 2022-04-29 RX ORDER — LIDOCAINE HYDROCHLORIDE 20 MG/ML
INJECTION, SOLUTION INFILTRATION; PERINEURAL
Status: COMPLETED
Start: 2022-04-29 | End: 2022-04-29

## 2022-04-29 RX ORDER — SODIUM CHLORIDE 9 MG/ML
10 INJECTION INTRAVENOUS
Status: COMPLETED | OUTPATIENT
Start: 2022-04-29 | End: 2022-04-29

## 2022-04-29 RX ADMIN — IOPAMIDOL 15 ML: 612 INJECTION, SOLUTION INTRATHECAL at 11:37

## 2022-04-29 RX ADMIN — SODIUM CHLORIDE 10 ML: 9 INJECTION INTRAMUSCULAR; INTRAVENOUS; SUBCUTANEOUS at 11:37

## 2022-04-29 RX ADMIN — LIDOCAINE HYDROCHLORIDE 10 ML: 20 INJECTION, SOLUTION INFILTRATION; PERINEURAL at 11:35

## 2022-04-29 RX ADMIN — GADOBENATE DIMEGLUMINE 5 ML: 529 INJECTION, SOLUTION INTRAVENOUS at 11:37

## 2022-05-02 ENCOUNTER — OFFICE VISIT (OUTPATIENT)
Dept: ORTHOPEDIC SURGERY | Facility: CLINIC | Age: 32
End: 2022-05-02

## 2022-05-02 VITALS — TEMPERATURE: 98.6 F | WEIGHT: 180 LBS | HEIGHT: 61 IN | BODY MASS INDEX: 33.99 KG/M2

## 2022-05-02 DIAGNOSIS — M75.111 INCOMPLETE TEAR OF RIGHT ROTATOR CUFF, UNSPECIFIED WHETHER TRAUMATIC: ICD-10-CM

## 2022-05-02 DIAGNOSIS — M25.511 CHRONIC RIGHT SHOULDER PAIN: ICD-10-CM

## 2022-05-02 DIAGNOSIS — G89.29 CHRONIC RIGHT SHOULDER PAIN: ICD-10-CM

## 2022-05-02 DIAGNOSIS — T14.8XXA JOINT CAPSULE TEAR: Primary | ICD-10-CM

## 2022-05-02 PROCEDURE — 99213 OFFICE O/P EST LOW 20 MIN: CPT | Performed by: PHYSICIAN ASSISTANT

## 2022-05-02 NOTE — PROGRESS NOTES
"Chief Complaint  Follow-up and Pain of the Right Shoulder and Results (MRI RESULTS)    Subjective    History of Present Illness      Hanna Matos is a 31 y.o. female who presents to [facility] for follow up on right shoulder pain. She returns today for follow/discussion on MRI results. Upon initial visit she reported pain described as aching, burning and stabbing.  The onset of the pain was gradual, starting about 1 year ago but she reports continued worsening.  Location is anterior. Symptoms are aggravated by lifting items with the arm/lifting the arm in abduction. Symptoms are diminished by rest and NSAIDs. She also reports a snapping noise with certain overhead movements of the shoulder. She has been involved in heavy labor regularly, such as construction.         Objective   Vital Signs:   Temp 98.6 °F (37 °C)   Ht 154.9 cm (61\")   Wt 81.6 kg (180 lb)   BMI 34.01 kg/m²     Physical Exam  Vitals signs and nursing note reviewed.   Constitutional:       Appearance: Normal appearance.   Pulmonary:      Effort: Pulmonary effort is normal.   Skin:     General: Skin is warm and dry.      Capillary Refill: Capillary refill takes less than 2 seconds.   Neurological:      General: No focal deficit present.      Mental Status: He is alert and oriented to person, place, and time. Mental status is at baseline.   Psychiatric:         Mood and Affect: Mood normal.         Behavior: Behavior normal.         Thought Content: Thought content normal.         Judgment: Judgment normal.     Ortho Exam   RIGHT shoulder  Positive for significant tenderness at the anterior aspect of the shoulder and at the insertion of the rotator cuff over the greater tuberosity of the humerus.   Forward flexion is 0-120 degrees, abduction is 0-110 degrees, external rotation is 0-40 degrees.   Positive for decreased strength in abduction and external rotation.   Crossover adduction test is positive.    Drop arm " sign is positive.  Sulcus sign is negative.   The pain level is 7.  There is no evidence of multidirectional instability.       Result Review :   Radiologic studies - see below for interpretation   Reviewed MR Arthrogram report of Right shoulder, performed at  [facility] on 4/29/2022, summary of impression below:  · AC joint is normal  · Partial-thickness bursal surface tear of infraspinatus tendon extending very near to the articular surface, measuring 0.6 cm AP  · T2 high signal abnormality in the subscapularis muscle body, suspected to represent lidocaine injected during arthrogram  · Small amount of fluid in the bursa consistent with bursitis  · No labral tear  · A small tear in the anteroinferior glenohumeral joint capsule with extra-articular leak of contrast material      RIGHT shoulder xrays   4 views were performed at [facility] on 3/1/22. Images were independently viewed and interpreted by myself, my impression as follows:  · Normal AC joint and normal glenohumeral joint, no abnormal findings        PROCEDURE  Procedures           Assessment   Assessment and Plan    Problem List Items Addressed This Visit        Musculoskeletal and Injuries    Chronic right shoulder pain    Relevant Orders    Ambulatory Referral to Orthopedic Surgery    Joint capsule tear - Primary    Relevant Orders    Ambulatory Referral to Orthopedic Surgery    Incomplete tear of right rotator cuff    Relevant Orders    Ambulatory Referral to Orthopedic Surgery          Follow Up   · Discussion of any imaging in detail. Discussion of orthopaedic goals.  · Risk, benefits, and merits of treatment alternatives reviewed with the patient. Discussed with Dr. Strong and the need for referral to Dr. Garcia for capsule tear of the shoulder. She is in agreement with referral.   · Ice, heat, and/or modalities as beneficial  · Patient is encouraged to call or return for any issues or concerns.  • Patient was given  instructions and counseling regarding her condition or for health maintenance advice. Please see specific information pulled into the AVS if appropriate.     Mejia Ramos PA-C   Date of Encounter: 5/2/2022   Electronically signed by Mejia Ramos PA-C, 05/02/22, 1:57 PM EDT.      EMR Dragon/Transcription disclaimer:  Much of this encounter note is an electronic transcription/translation of spoken language to printed text. The electronic translation of spoken language may permit erroneous, or at times, nonsensical words or phrases to be inadvertently transcribed; Although I have reviewed the note for such errors, some may still exist.

## 2022-05-03 ENCOUNTER — TELEPHONE (OUTPATIENT)
Dept: ORTHOPEDIC SURGERY | Facility: CLINIC | Age: 32
End: 2022-05-03

## 2022-05-03 NOTE — TELEPHONE ENCOUNTER
PT HAD MRI RIGHT SHOULDER DONE AND HAS A TEAR WANTS TO SEE BEEN     APPT SCHED FOR 5/18 OR DOES SHE NEED TO BE SEEN SOONER PLEASE ADVISE

## 2022-05-18 ENCOUNTER — OFFICE VISIT (OUTPATIENT)
Dept: ORTHOPEDIC SURGERY | Facility: CLINIC | Age: 32
End: 2022-05-18

## 2022-05-18 ENCOUNTER — TELEPHONE (OUTPATIENT)
Dept: ORTHOPEDIC SURGERY | Facility: CLINIC | Age: 32
End: 2022-05-18

## 2022-05-18 VITALS — HEIGHT: 61 IN | BODY MASS INDEX: 34.66 KG/M2 | WEIGHT: 183.6 LBS | HEART RATE: 81 BPM | OXYGEN SATURATION: 98 %

## 2022-05-18 DIAGNOSIS — M75.111 INCOMPLETE TEAR OF RIGHT ROTATOR CUFF, UNSPECIFIED WHETHER TRAUMATIC: Primary | ICD-10-CM

## 2022-05-18 PROCEDURE — 99203 OFFICE O/P NEW LOW 30 MIN: CPT | Performed by: ORTHOPAEDIC SURGERY

## 2022-05-18 RX ORDER — DICLOFENAC SODIUM 75 MG/1
75 TABLET, DELAYED RELEASE ORAL 2 TIMES DAILY
Qty: 60 TABLET | Refills: 0 | Status: SHIPPED | OUTPATIENT
Start: 2022-05-18 | End: 2022-06-27 | Stop reason: SINTOL

## 2022-05-18 NOTE — TELEPHONE ENCOUNTER
Caller: Hanna Matos    Relationship: Self    Best call back number: [phone]     What orders are you requesting (i.e. lab or imaging):   PATIENT SAW DR CHELI RODRIGUEZ TODAY 05-18-22 (& HAS FOLLOW UP SCHEDULED w/MADI NUNEZ 06-22-22)  FOR RIGHT SHOULDER -     BUT PATIENT DISAGREES w/DR RODRIGUEZ'S ASSESSMENT & RECOMMENDATION OF INJECTION & PHYSICAL THERAPY (WAS DIFFERENT FROM DR FRYE's ASSESSMENT THAT PATIENT MIGHT NEED RIGHT SHOULDER SURGERY)     PATIENT WOULD LIKE A 2ND REFERRAL TO GO TO ANOTHER ORTHO PROVIDER FOR A 3RD OPINION - PATIENT DOES NOT KNOW OF ANYONE IN PARTICULAR TO SEE, WHOEVER DR FRYE THINKS WOULD BE GOOD FOR PATIENT TO SEE     PLEASE CALL / LEAVE VMAIL WHEN REFERRAL ENTERED IN Epic &or SENT TO OUTSIDE ORTHO PROVIDER FOR SCHEDULING     THANKS

## 2022-05-18 NOTE — PROGRESS NOTES
"Chief Complaint  Initial Evaluation of the Right Shoulder     Subjective      Hanna Matos presents to [facility] for an evaluation of right shoulder. She has been having right shoulder pain for 1 year. She states she was working at [employer] when she lifted her arm and heard a pop in the shoulder. Pain became more persistent since then. Pain progressively worsened with time. She has pain with overhead motions.  Pain is anteriorly that radiates down the upper arm.     No Known Allergies     Social History     Socioeconomic History   • Marital status: Single   • Number of children: 2   Tobacco Use   • Smoking status: Former Smoker     Packs/day: 0.50     Years: 22.00     Pack years: 11.00     Types: Cigarettes     Quit date: 2021     Years since quittin.6   • Smokeless tobacco: Never Used   Vaping Use   • Vaping Use: Every day   • Substances: Nicotine, Flavoring   • Devices: Disposable   Substance and Sexual Activity   • Alcohol use: Never   • Drug use: Never   • Sexual activity: Defer        Review of Systems     Objective   Vital Signs:   Pulse 81   Ht 154.9 cm (61\")   Wt 83.3 kg (183 lb 9.6 oz)   SpO2 98%   BMI 34.69 kg/m²       Physical Exam  Constitutional:       Appearance: Normal appearance. Patient is well-developed and normal weight.   HENT:      Head: Normocephalic.      Right Ear: Hearing and external ear normal.      Left Ear: Hearing and external ear normal.      Nose: Nose normal.   Eyes:      Conjunctiva/sclera: Conjunctivae normal.   Cardiovascular:      Rate and Rhythm: Normal rate.   Pulmonary:      Effort: Pulmonary effort is normal.      Breath sounds: No wheezing or rales.   Abdominal:      Palpations: Abdomen is soft.      Tenderness: There is no abdominal tenderness.   Musculoskeletal:      Cervical back: Normal range of motion.   Skin:     Findings: No rash.   Neurological:      Mental Status: Patient  is alert and oriented to person, place, and " time.   Psychiatric:         Mood and Affect: Mood and affect normal.         Judgment: Judgment normal.       Ortho Exam      RIGHT SHOULDER: Near full forward elevation with moderate pain. Pain with empty can testing. Good tone of deltoid, biceps, triceps, wrist extensors, and wrist flexors.  Sensation grossly intact. Neurovascular intact.  No swelling, skin discoloration or atrophy.       Procedures        Imaging Results (Most Recent)     None           Result Review :         MRI Shoulder Right Arthrogram    Result Date: 4/29/2022  Narrative: PROCEDURE: MRI SHOULDER RIGHT ARTHROGRAM  COMPARISON: [facility], [place], [place] CONTRAST INJECTION CT/MRI, 4/29/2022, 11:42.  [facility], [place], XR SHOULDER 2+ VW RIGHT, 3/01/2022, 9:31.  INDICATIONS: chronic right shoulder pain, weakness      TECHNIQUE: A variety of imaging planes and parameters were utilized for visualization of suspected pathology.  Images were performed after intra-articular injection of a mixture of non-ionic contrast and 0.1 cc of gadolinium contrast material.  The injection procedure is described in a separate report.   FINDINGS:  No fracture or malalignment is identified.  Marrow signal appears normal.  The acromioclavicular joint appears normal.  No AC joint effusion is noted.  There is a partial thickness bursal surface tear of the infra spinatus tendon which extends very near to the articular surface.  The tear measures 0.6 cm AP.  There is T2 high signal abnormality in the subscapularis muscle body, suspected to represent lidocaine injected during the arthrogram.  The rotator cuff otherwise appears unremarkable.  No contrast is seen extending into the subdeltoid/subacromial bursa.  A small amount of fluid is noted in the bursa consistent with bursitis.  The biceps long head tendon and its attachment to the superior labrum are intact.  No labral tear is seen.  Contrast is seen leaking in to the soft tissues inferior to the  axillary pouch.  There is a small tear in the anteroinferior capsule.  The glenohumeral ligaments appear intact.  Cartilage in the joint is intact.  No loose body is seen.      Impression:   1. Small partial thickness bursal surface tear of the infra spinatus tendon 2. Tear of the anteroinferior glenohumeral joint capsule with extra-articular leak of contrast material     Phillip Gusman M.D.       Electronically Signed and Approved By: Phillip Gusman M.D. on 4/29/2022 at 13:27             [place] Contrast Injection CT / MRI    Result Date: 4/29/2022  Narrative: PROCEDURE: [place] CONTRAST INJECTION CT/MRI  COMPARISON: None  INDICATIONS: chronic right shoulder pain and weakness.FLUORO TIME-.3 MINUTES. FLUORO IMAGES-3.  4.6 mGy.  FINDINGS:  The risks, benefits and alternatives of the procedure were explained to the patient.  The chief risks discussed were bleeding, infection and allergic reaction.  The chief options discussed were doing nothing and doing the MRI without intra-articular contrast.  The patient indicated she understood what was discussed and elected to proceed.  She provided written consent.  Skin overlying the right shoulder joint was prepped and draped in normal sterile fashion.  2% lidocaine was injected along the anticipated tract of the needle.  A 22 gauge needle was advanced into the glenohumeral joint under fluoroscopic guidance.  14 cc of a mixture containing 10 cc sterile saline, 4 cc iodinated contrast and 0.1 cc gadolinium was injected.  The needle was withdrawn.  The patient demonstrated no complication was transferred MRI for further imaging.      Impression:   1. Successful right shoulder arthrogram performed for a planned MRI later today      Phillip Gusman M.D.       Electronically Signed and Approved By: Phillip Gusman M.D. on 4/29/2022 at 13:33                      Assessment and Plan     Diagnoses and all orders for this visit:    1. Partial tear of the right rotator cuff  (Primary)        Risks and  benefits of a right shoulder injection discussed. She wishes to hold off on an injection today. She was prescribed an anti-inflammatory today. A prescription for PT written.     Call or return if worsening symptoms.    Follow Up     4-6 weeks.       Patient was given instructions and counseling regarding her condition or for health maintenance advice. Please see specific information pulled into the AVS if appropriate.     Scribed for Praneeth Garcia MD by Carolin Olivas.  05/18/22   08:29 EDT    I have personally performed the services described in this document as scribed by the above individual and it is both accurate and complete. Praneeth Garcia MD 05/18/22

## 2022-05-20 NOTE — TELEPHONE ENCOUNTER
Please make her an appointment to see Dr. Estrada Rg at [facility] for another opinion on the shoulder.  Thank you

## 2022-05-23 NOTE — TELEPHONE ENCOUNTER
PATIENT CALLED CHECKING STATUS OF REFERRAL REQUEST.  I LET HER KNOW THAT DR. FRYE IS REFERRING HER TO DR. RENTAE DENT AT [facility] AND THAT SOMEONE WOULD BE CALLING HER WITH THAT APPOINTMENT INFORMATION

## 2022-06-27 ENCOUNTER — OFFICE VISIT (OUTPATIENT)
Dept: FAMILY MEDICINE CLINIC | Age: 32
End: 2022-06-27

## 2022-06-27 ENCOUNTER — LAB (OUTPATIENT)
Dept: LAB | Facility: HOSPITAL | Age: 32
End: 2022-06-27

## 2022-06-27 VITALS
SYSTOLIC BLOOD PRESSURE: 107 MMHG | TEMPERATURE: 98.7 F | HEART RATE: 86 BPM | BODY MASS INDEX: 33.91 KG/M2 | DIASTOLIC BLOOD PRESSURE: 54 MMHG | WEIGHT: 179.6 LBS | HEIGHT: 61 IN

## 2022-06-27 DIAGNOSIS — R10.13 EPIGASTRIC PAIN: Primary | ICD-10-CM

## 2022-06-27 DIAGNOSIS — R10.11 RUQ PAIN: ICD-10-CM

## 2022-06-27 DIAGNOSIS — R10.13 EPIGASTRIC PAIN: ICD-10-CM

## 2022-06-27 LAB
ALBUMIN SERPL-MCNC: 3.6 G/DL (ref 3.5–5.2)
ALBUMIN/GLOB SERPL: 1.2 G/DL
ALP SERPL-CCNC: 60 U/L (ref 39–117)
ALT SERPL W P-5'-P-CCNC: 18 U/L (ref 1–33)
ANION GAP SERPL CALCULATED.3IONS-SCNC: 6.3 MMOL/L (ref 5–15)
AST SERPL-CCNC: 15 U/L (ref 1–32)
BILIRUB SERPL-MCNC: 0.5 MG/DL (ref 0–1.2)
BUN SERPL-MCNC: 12 MG/DL (ref 6–20)
BUN/CREAT SERPL: 17.6 (ref 7–25)
CALCIUM SPEC-SCNC: 8.6 MG/DL (ref 8.6–10.5)
CHLORIDE SERPL-SCNC: 105 MMOL/L (ref 98–107)
CO2 SERPL-SCNC: 27.7 MMOL/L (ref 22–29)
CREAT SERPL-MCNC: 0.68 MG/DL (ref 0.57–1)
DEPRECATED RDW RBC AUTO: 40.5 FL (ref 37–54)
EGFRCR SERPLBLD CKD-EPI 2021: 119.6 ML/MIN/1.73
ERYTHROCYTE [DISTWIDTH] IN BLOOD BY AUTOMATED COUNT: 11.7 % (ref 12.3–15.4)
GLOBULIN UR ELPH-MCNC: 3.1 GM/DL
GLUCOSE SERPL-MCNC: 99 MG/DL (ref 65–99)
HCT VFR BLD AUTO: 35.9 % (ref 34–46.6)
HGB BLD-MCNC: 11.8 G/DL (ref 12–15.9)
LIPASE SERPL-CCNC: 20 U/L (ref 13–60)
MCH RBC QN AUTO: 30.4 PG (ref 26.6–33)
MCHC RBC AUTO-ENTMCNC: 32.9 G/DL (ref 31.5–35.7)
MCV RBC AUTO: 92.5 FL (ref 79–97)
PLATELET # BLD AUTO: 203 10*3/MM3 (ref 140–450)
PMV BLD AUTO: 9.4 FL (ref 6–12)
POTASSIUM SERPL-SCNC: 4 MMOL/L (ref 3.5–5.2)
PROT SERPL-MCNC: 6.7 G/DL (ref 6–8.5)
RBC # BLD AUTO: 3.88 10*6/MM3 (ref 3.77–5.28)
SODIUM SERPL-SCNC: 139 MMOL/L (ref 136–145)
UREA BREATH TEST QL: NEGATIVE
WBC NRBC COR # BLD: 6.08 10*3/MM3 (ref 3.4–10.8)

## 2022-06-27 PROCEDURE — 83013 H PYLORI (C-13) BREATH: CPT

## 2022-06-27 PROCEDURE — 36415 COLL VENOUS BLD VENIPUNCTURE: CPT

## 2022-06-27 PROCEDURE — 99214 OFFICE O/P EST MOD 30 MIN: CPT | Performed by: NURSE PRACTITIONER

## 2022-06-27 PROCEDURE — 85027 COMPLETE CBC AUTOMATED: CPT

## 2022-06-27 PROCEDURE — 83690 ASSAY OF LIPASE: CPT

## 2022-06-27 PROCEDURE — 80053 COMPREHEN METABOLIC PANEL: CPT

## 2022-06-27 RX ORDER — DICYCLOMINE HCL 20 MG
20 TABLET ORAL 3 TIMES DAILY PRN
Qty: 90 TABLET | Refills: 0 | Status: SHIPPED | OUTPATIENT
Start: 2022-06-27 | End: 2023-01-27

## 2022-06-27 RX ORDER — SUCRALFATE 1 G/1
1 TABLET ORAL 4 TIMES DAILY
Qty: 120 TABLET | Refills: 0 | Status: SHIPPED | OUTPATIENT
Start: 2022-06-27 | End: 2022-07-27

## 2022-06-27 RX ORDER — PANTOPRAZOLE SODIUM 40 MG/1
40 TABLET, DELAYED RELEASE ORAL DAILY
Qty: 30 TABLET | Refills: 0 | Status: SHIPPED | OUTPATIENT
Start: 2022-06-27 | End: 2023-01-27 | Stop reason: SDUPTHER

## 2022-06-27 NOTE — PROGRESS NOTES
"Chief Complaint  Stool Color Change (States it's black, onset x2 days ago, states also sharp stomach pain, denies N/V/D)    Subjective          Hanna Matos presents to [facility]  Symptoms started Thursday    Abdominal Pain  This is a new problem. The current episode started in the past 7 days. The problem occurs constantly. The problem has been gradually worsening. The pain is located in the epigastric region, RUQ and LUQ. The pain is at a severity of 10/10. The quality of the pain is sharp (stabbing). The abdominal pain does not radiate. Associated symptoms include anorexia, belching, flatus and melena (Admits to taking Pepto). Pertinent negatives include no constipation, diarrhea, fever, hematochezia, nausea or vomiting. The pain is aggravated by eating. The pain is relieved by nothing. Treatments tried: Pepto, naproxen, ibuprofen. The treatment provided no relief.       Objective   Vital Signs:   /54 (BP Location: Left arm, Patient Position: Sitting)   Pulse 86   Temp 98.7 °F (37.1 °C) (Oral)   Ht 154.9 cm (61\")   Wt 81.5 kg (179 lb 9.6 oz)   BMI 33.94 kg/m²     Physical Exam  Constitutional:       General: She is not in acute distress.     Appearance: Normal appearance. She is normal weight.   HENT:      Head: Normocephalic.   Eyes:      Pupils: Pupils are equal, round, and reactive to light.      Visual Fields: Right eye visual fields normal and left eye visual fields normal.   Neck:      Trachea: Trachea normal.   Cardiovascular:      Rate and Rhythm: Normal rate and regular rhythm.      Heart sounds: Normal heart sounds.   Pulmonary:      Effort: Pulmonary effort is normal.      Breath sounds: Normal breath sounds and air entry.   Abdominal:      General: Abdomen is flat. Bowel sounds are normal. There is no distension.      Palpations: Abdomen is soft. There is no mass.      Tenderness: There is abdominal tenderness (RUQ ) in the right upper quadrant. " Negative signs include Marc's sign.   Musculoskeletal:      Right lower leg: No edema.      Left lower leg: No edema.   Skin:     General: Skin is warm and dry.   Neurological:      Mental Status: She is alert and oriented to person, place, and time.   Psychiatric:         Mood and Affect: Mood and affect normal.         Behavior: Behavior normal.         Thought Content: Thought content normal.        Result Review :   The following data was reviewed by: TRINI Xiong on 06/27/2022:                  Assessment and Plan    Diagnoses and all orders for this visit:    1. Epigastric pain (Primary)  -     CBC (No Diff); Future  -     Comprehensive Metabolic Panel; Future  -     Lipase; Future  -     H. Pylori Breath Test - Breath, Lung; Future  -     US Abdomen Limited; Future  -     NM HIDA SCAN WITH PHARMACOLOGICAL INTERVENTION; Future  -     pantoprazole (PROTONIX) 40 MG EC tablet; Take 1 tablet by mouth Daily.  Dispense: 30 tablet; Refill: 0  -     sucralfate (CARAFATE) 1 g tablet; Take 1 tablet by mouth 4 (Four) Times a Day for 30 days.  Dispense: 120 tablet; Refill: 0  -     dicyclomine (BENTYL) 20 MG tablet; Take 1 tablet by mouth 3 (Three) Times a Day As Needed (abdominal pain).  Dispense: 90 tablet; Refill: 0    2. RUQ pain  -     CBC (No Diff); Future  -     Comprehensive Metabolic Panel; Future  -     Lipase; Future  -     H. Pylori Breath Test - Breath, Lung; Future  -     US Abdomen Limited; Future  -     NM HIDA SCAN WITH PHARMACOLOGICAL INTERVENTION; Future  -     pantoprazole (PROTONIX) 40 MG EC tablet; Take 1 tablet by mouth Daily.  Dispense: 30 tablet; Refill: 0  -     sucralfate (CARAFATE) 1 g tablet; Take 1 tablet by mouth 4 (Four) Times a Day for 30 days.  Dispense: 120 tablet; Refill: 0  -     dicyclomine (BENTYL) 20 MG tablet; Take 1 tablet by mouth 3 (Three) Times a Day As Needed (abdominal pain).  Dispense: 90 tablet; Refill: 0    Discussed if she develops N/V, fever, chills, and/or  jaundice, she is to go to the ED. She has been using Pepto, which most likely explains her melena. Will check CBC, CMP, lipase, and H. pyloir.      Follow Up   Return if symptoms worsen or fail to improve.  Patient was given instructions and counseling regarding her condition or for health maintenance advice. Please see specific information pulled into the AVS if appropriate.

## 2022-06-28 ENCOUNTER — APPOINTMENT (OUTPATIENT)
Dept: ULTRASOUND IMAGING | Facility: HOSPITAL | Age: 32
End: 2022-06-28

## 2022-06-28 ENCOUNTER — HOSPITAL ENCOUNTER (EMERGENCY)
Facility: HOSPITAL | Age: 32
Discharge: HOME | End: 2022-06-28
Attending: EMERGENCY MEDICINE | Admitting: EMERGENCY MEDICINE

## 2022-06-28 ENCOUNTER — TELEPHONE (OUTPATIENT)
Dept: FAMILY MEDICINE CLINIC | Age: 32
End: 2022-06-28

## 2022-06-28 ENCOUNTER — APPOINTMENT (OUTPATIENT)
Dept: CT IMAGING | Facility: HOSPITAL | Age: 32
End: 2022-06-28

## 2022-06-28 VITALS
OXYGEN SATURATION: 100 % | HEART RATE: 75 BPM | DIASTOLIC BLOOD PRESSURE: 59 MMHG | HEIGHT: 61 IN | TEMPERATURE: 97.4 F | RESPIRATION RATE: 20 BRPM | WEIGHT: 177.91 LBS | SYSTOLIC BLOOD PRESSURE: 108 MMHG | BODY MASS INDEX: 33.59 KG/M2

## 2022-06-28 DIAGNOSIS — K21.00 GASTROESOPHAGEAL REFLUX DISEASE WITH ESOPHAGITIS WITHOUT HEMORRHAGE: ICD-10-CM

## 2022-06-28 DIAGNOSIS — R10.10 ACUTE UPPER ABDOMINAL PAIN: Primary | ICD-10-CM

## 2022-06-28 DIAGNOSIS — K29.00 ACUTE SUPERFICIAL GASTRITIS WITHOUT HEMORRHAGE: ICD-10-CM

## 2022-06-28 LAB
ALBUMIN SERPL-MCNC: 4.2 G/DL (ref 3.5–5.2)
ALBUMIN/GLOB SERPL: 1.5 G/DL
ALP SERPL-CCNC: 71 U/L (ref 39–117)
ALT SERPL W P-5'-P-CCNC: 14 U/L (ref 1–33)
ANION GAP SERPL CALCULATED.3IONS-SCNC: 9.1 MMOL/L (ref 5–15)
AST SERPL-CCNC: 12 U/L (ref 1–32)
BASOPHILS # BLD AUTO: 0.02 10*3/MM3 (ref 0–0.2)
BASOPHILS NFR BLD AUTO: 0.3 % (ref 0–1.5)
BILIRUB SERPL-MCNC: 0.6 MG/DL (ref 0–1.2)
BILIRUB UR QL STRIP: NEGATIVE
BUN SERPL-MCNC: 12 MG/DL (ref 6–20)
BUN/CREAT SERPL: 16.4 (ref 7–25)
CALCIUM SPEC-SCNC: 9.1 MG/DL (ref 8.6–10.5)
CHLORIDE SERPL-SCNC: 104 MMOL/L (ref 98–107)
CLARITY UR: CLEAR
CO2 SERPL-SCNC: 27.9 MMOL/L (ref 22–29)
COLOR UR: YELLOW
CREAT SERPL-MCNC: 0.73 MG/DL (ref 0.57–1)
DEPRECATED RDW RBC AUTO: 39.8 FL (ref 37–54)
EGFRCR SERPLBLD CKD-EPI 2021: 112.9 ML/MIN/1.73
EOSINOPHIL # BLD AUTO: 0.11 10*3/MM3 (ref 0–0.4)
EOSINOPHIL NFR BLD AUTO: 1.9 % (ref 0.3–6.2)
ERYTHROCYTE [DISTWIDTH] IN BLOOD BY AUTOMATED COUNT: 11.7 % (ref 12.3–15.4)
GLOBULIN UR ELPH-MCNC: 2.8 GM/DL
GLUCOSE SERPL-MCNC: 93 MG/DL (ref 65–99)
GLUCOSE UR STRIP-MCNC: NEGATIVE MG/DL
HCT VFR BLD AUTO: 36.2 % (ref 34–46.6)
HGB BLD-MCNC: 12 G/DL (ref 12–15.9)
HGB UR QL STRIP.AUTO: NEGATIVE
HOLD SPECIMEN: NORMAL
HOLD SPECIMEN: NORMAL
IMM GRANULOCYTES # BLD AUTO: 0.01 10*3/MM3 (ref 0–0.05)
IMM GRANULOCYTES NFR BLD AUTO: 0.2 % (ref 0–0.5)
KETONES UR QL STRIP: NEGATIVE
LEUKOCYTE ESTERASE UR QL STRIP.AUTO: NEGATIVE
LIPASE SERPL-CCNC: 24 U/L (ref 13–60)
LYMPHOCYTES # BLD AUTO: 1.37 10*3/MM3 (ref 0.7–3.1)
LYMPHOCYTES NFR BLD AUTO: 23.8 % (ref 19.6–45.3)
MCH RBC QN AUTO: 30.4 PG (ref 26.6–33)
MCHC RBC AUTO-ENTMCNC: 33.1 G/DL (ref 31.5–35.7)
MCV RBC AUTO: 91.6 FL (ref 79–97)
MONOCYTES # BLD AUTO: 0.52 10*3/MM3 (ref 0.1–0.9)
MONOCYTES NFR BLD AUTO: 9 % (ref 5–12)
NEUTROPHILS NFR BLD AUTO: 3.73 10*3/MM3 (ref 1.7–7)
NEUTROPHILS NFR BLD AUTO: 64.8 % (ref 42.7–76)
NITRITE UR QL STRIP: NEGATIVE
NRBC BLD AUTO-RTO: 0 /100 WBC (ref 0–0.2)
PH UR STRIP.AUTO: 7.5 [PH] (ref 5–8)
PLATELET # BLD AUTO: 205 10*3/MM3 (ref 140–450)
PMV BLD AUTO: 9.5 FL (ref 6–12)
POTASSIUM SERPL-SCNC: 3.8 MMOL/L (ref 3.5–5.2)
PROT SERPL-MCNC: 7 G/DL (ref 6–8.5)
PROT UR QL STRIP: NEGATIVE
RBC # BLD AUTO: 3.95 10*6/MM3 (ref 3.77–5.28)
SODIUM SERPL-SCNC: 141 MMOL/L (ref 136–145)
SP GR UR STRIP: 1.02 (ref 1–1.03)
UROBILINOGEN UR QL STRIP: NORMAL
WBC NRBC COR # BLD: 5.76 10*3/MM3 (ref 3.4–10.8)
WHOLE BLOOD HOLD COAG: NORMAL
WHOLE BLOOD HOLD SPECIMEN: NORMAL

## 2022-06-28 PROCEDURE — 85025 COMPLETE CBC W/AUTO DIFF WBC: CPT

## 2022-06-28 PROCEDURE — 36415 COLL VENOUS BLD VENIPUNCTURE: CPT

## 2022-06-28 PROCEDURE — 76705 ECHO EXAM OF ABDOMEN: CPT

## 2022-06-28 PROCEDURE — 81003 URINALYSIS AUTO W/O SCOPE: CPT

## 2022-06-28 PROCEDURE — 96372 THER/PROPH/DIAG INJ SC/IM: CPT

## 2022-06-28 PROCEDURE — 83690 ASSAY OF LIPASE: CPT

## 2022-06-28 PROCEDURE — 74177 CT ABD & PELVIS W/CONTRAST: CPT

## 2022-06-28 PROCEDURE — 25010000002 KETOROLAC TROMETHAMINE PER 15 MG: Performed by: EMERGENCY MEDICINE

## 2022-06-28 PROCEDURE — 99283 EMERGENCY DEPT VISIT LOW MDM: CPT

## 2022-06-28 PROCEDURE — 80053 COMPREHEN METABOLIC PANEL: CPT

## 2022-06-28 PROCEDURE — 0 IOPAMIDOL PER 1 ML: Performed by: EMERGENCY MEDICINE

## 2022-06-28 RX ORDER — ALUMINA, MAGNESIA, AND SIMETHICONE 2400; 2400; 240 MG/30ML; MG/30ML; MG/30ML
15 SUSPENSION ORAL ONCE
Status: COMPLETED | OUTPATIENT
Start: 2022-06-28 | End: 2022-06-28

## 2022-06-28 RX ORDER — SODIUM CHLORIDE 0.9 % (FLUSH) 0.9 %
10 SYRINGE (ML) INJECTION AS NEEDED
Status: DISCONTINUED | OUTPATIENT
Start: 2022-06-28 | End: 2022-06-28 | Stop reason: HOSPADM

## 2022-06-28 RX ORDER — KETOROLAC TROMETHAMINE 30 MG/ML
30 INJECTION, SOLUTION INTRAMUSCULAR; INTRAVENOUS ONCE
Status: DISCONTINUED | OUTPATIENT
Start: 2022-06-28 | End: 2022-06-28

## 2022-06-28 RX ORDER — LIDOCAINE HYDROCHLORIDE 20 MG/ML
15 SOLUTION OROPHARYNGEAL ONCE
Status: COMPLETED | OUTPATIENT
Start: 2022-06-28 | End: 2022-06-28

## 2022-06-28 RX ORDER — KETOROLAC TROMETHAMINE 30 MG/ML
30 INJECTION, SOLUTION INTRAMUSCULAR; INTRAVENOUS ONCE
Status: COMPLETED | OUTPATIENT
Start: 2022-06-28 | End: 2022-06-28

## 2022-06-28 RX ADMIN — KETOROLAC TROMETHAMINE 30 MG: 30 INJECTION, SOLUTION INTRAMUSCULAR; INTRAVENOUS at 16:17

## 2022-06-28 RX ADMIN — LIDOCAINE HYDROCHLORIDE 15 ML: 20 SOLUTION ORAL; TOPICAL at 16:17

## 2022-06-28 RX ADMIN — IOPAMIDOL 100 ML: 755 INJECTION, SOLUTION INTRAVENOUS at 17:09

## 2022-06-28 RX ADMIN — ALUMINUM HYDROXIDE, MAGNESIUM HYDROXIDE, AND DIMETHICONE 15 ML: 400; 400; 40 SUSPENSION ORAL at 16:17

## 2022-06-28 NOTE — TELEPHONE ENCOUNTER
Pt stated that all she is eating is peanutbutter and crackers and only drinking water and sprite. Pt stated the meds given to her yesterday are causing her to be worse. Now she is having pains up in her right shoulder blade and it is pretty bad. Her u/s appt is 7/5/22 and hida is 7/28/22. She is now having diarrhea as well. Inf pt to try gatorade or powerade zero, rest, eat very light, meds given would help with sx however if pt feels pain is just too much to go to ER for eval where they can do imaging at visit without insurance approval.

## 2022-07-05 ENCOUNTER — APPOINTMENT (OUTPATIENT)
Dept: ULTRASOUND IMAGING | Facility: HOSPITAL | Age: 32
End: 2022-07-05

## 2022-07-28 ENCOUNTER — APPOINTMENT (OUTPATIENT)
Dept: NUCLEAR MEDICINE | Facility: HOSPITAL | Age: 32
End: 2022-07-28

## 2022-09-01 ENCOUNTER — OFFICE VISIT (OUTPATIENT)
Dept: FAMILY MEDICINE CLINIC | Age: 32
End: 2022-09-01

## 2022-09-01 VITALS
RESPIRATION RATE: 16 BRPM | WEIGHT: 178.2 LBS | BODY MASS INDEX: 33.64 KG/M2 | SYSTOLIC BLOOD PRESSURE: 114 MMHG | HEIGHT: 61 IN | HEART RATE: 76 BPM | OXYGEN SATURATION: 98 % | DIASTOLIC BLOOD PRESSURE: 75 MMHG

## 2022-09-01 DIAGNOSIS — T14.8XXA JOINT CAPSULE TEAR: ICD-10-CM

## 2022-09-01 DIAGNOSIS — R51.9 DAILY HEADACHE: Primary | ICD-10-CM

## 2022-09-01 DIAGNOSIS — M75.111 INCOMPLETE TEAR OF RIGHT ROTATOR CUFF, UNSPECIFIED WHETHER TRAUMATIC: ICD-10-CM

## 2022-09-01 DIAGNOSIS — F41.9 ANXIETY: ICD-10-CM

## 2022-09-01 PROCEDURE — 99214 OFFICE O/P EST MOD 30 MIN: CPT | Performed by: NURSE PRACTITIONER

## 2022-09-01 RX ORDER — HYDROXYZINE HYDROCHLORIDE 25 MG/1
25 TABLET, FILM COATED ORAL 3 TIMES DAILY PRN
Qty: 30 TABLET | Refills: 1 | Status: SHIPPED | OUTPATIENT
Start: 2022-09-01 | End: 2023-01-27 | Stop reason: SDUPTHER

## 2022-09-01 NOTE — PROGRESS NOTES
Hanna Matos presents to [facility].    Chief Complaint:  Migraine (Ongoing, for a couple weeks. Has been taking ibuprofen and tylonel daily with some relief, but never fully takes the migraine away. Reports waking up every morning with a migraine and worsens throughout the day. Denies any head injury recently. Reports under a lot of stress and anxiety recently.)         History of Present Illness:  Daily headaches:  Symptoms started: a few weeks ago (is under increased stress)  Associated symptoms: none   Treatment tried: Ibuprofen and Tylneol, they help  Located on her temples and forehead     PAST MEDICAL HISTORY changes since 3-2022:         Asthma: dx'd at age 18 mos;     Pneumonia: hospitalized;     Urinary Tract Infections, Recurrent: dx'd at age 4; hospitilized;     Chicken pox     Cervical cancer         GYNECOLOGICAL HISTORY:     miscarriage 1    No problems with menstrual cycles.    Contraception: S/P tubal ligation;    Menarche occurred at age 15 yrs.    (+) hx of abnormal Pap ( she has undergone LEEP ) Sexually Active? yes         CURRENT MEDICAL PROVIDERS:    Obstetrician/Gynecologist: Dr BOZENA alegre-- for pylonephritis     miscarrage X1         Surgical History:        pilonidal cyst 10-   Hysterectomy , [facility]    Bilateral Tubal Ligation    R CTS ;         Family History:         Paternal Grandfather: Congestive Heart Failure (  );  Type 2 Diabetes     Maternal Grandfather: Lung Cancer (  )   Father:  at age 52; Cause of death was lymphoma    ; Positive for Myocardial Infarction;     Mother: Healthy    ; Positive for ADD/ADHD;     Son(s): Healthy; 2 son(s) total         Social History:     Occupation:. 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   Eyes:  "Negative for blurred vision.   Respiratory: Negative for cough and shortness of breath.    Cardiovascular: Negative for chest pain, palpitations and leg swelling.   Gastrointestinal: Negative for nausea.   Musculoskeletal: Positive for arthralgias (right shoulder pain, persist, did PT , needs form for hunting, crossbow, sees A Stucker ).   Neurological: Negative for numbness.   Psychiatric/Behavioral: Positive for stress (moved, getting a new place, living situation challenging, going to school to [employer]). Negative for sleep disturbance.          Current Outpatient Medications:   •  ketoconazole (Nizoral) 2 % shampoo, Apply  topically to the appropriate area as directed 2 (Two) Times a Week., Disp: 100 mL, Rfl: 1  •  dicyclomine (BENTYL) 20 MG tablet, Take 1 tablet by mouth 3 (Three) Times a Day As Needed for abdominal pain, Disp: 90 tablet, Rfl: 0  •  hydrOXYzine (ATARAX) 25 MG tablet, Take 1 tablet by mouth 3 (Three) Times a Day As Needed for Anxiety., Disp: 30 tablet, Rfl: 1  •  pantoprazole (PROTONIX) 40 MG EC tablet, Take 1 tablet by mouth Daily., Disp: 30 tablet, Rfl: 0    Vital Signs:   Vitals:    09/01/22 0822   BP: 114/75   BP Location: Right arm   Patient Position: Sitting   Cuff Size: Adult   Pulse: 76   Resp: 16   SpO2: 98%  Comment: Room air   Weight: 80.8 kg (178 lb 3.2 oz)   Height: 154.9 cm (61\")   PainSc:   5   PainLoc: Head         Physical Exam:  Physical Exam  Vitals reviewed.   Constitutional:       General: She is not in acute distress.     Appearance: Normal appearance.   Eyes:      Extraocular Movements: Extraocular movements intact.      Pupils: Pupils are equal, round, and reactive to light.   Neck:      Vascular: No carotid bruit.   Cardiovascular:      Rate and Rhythm: Normal rate and regular rhythm.      Heart sounds: Normal heart sounds. No murmur heard.  Pulmonary:      Effort: Pulmonary effort is normal. No respiratory distress.      Breath sounds: Normal breath sounds. "   Musculoskeletal:         General: No tenderness (temporal ).      Comments: Mild pain with ROM of right shoulder    Neurological:      General: No focal deficit present.      Mental Status: She is alert.   Psychiatric:         Mood and Affect: Mood normal.         Behavior: Behavior normal.         Result Review      The following data was reviewed by: TRINI Fontanez on 09/01/2022:    Results for orders placed or performed during the hospital encounter of 06/28/22   Comprehensive Metabolic Panel    Specimen: Blood   Result Value Ref Range    Glucose 93 65 - 99 mg/dL    BUN 12 6 - 20 mg/dL    Creatinine 0.73 0.57 - 1.00 mg/dL    Sodium 141 136 - 145 mmol/L    Potassium 3.8 3.5 - 5.2 mmol/L    Chloride 104 98 - 107 mmol/L    CO2 27.9 22.0 - 29.0 mmol/L    Calcium 9.1 8.6 - 10.5 mg/dL    Total Protein 7.0 6.0 - 8.5 g/dL    Albumin 4.20 3.50 - 5.20 g/dL    ALT (SGPT) 14 1 - 33 U/L    AST (SGOT) 12 1 - 32 U/L    Alkaline Phosphatase 71 39 - 117 U/L    Total Bilirubin 0.6 0.0 - 1.2 mg/dL    Globulin 2.8 gm/dL    A/G Ratio 1.5 g/dL    BUN/Creatinine Ratio 16.4 7.0 - 25.0    Anion Gap 9.1 5.0 - 15.0 mmol/L    eGFR 112.9 >60.0 mL/min/1.73   Lipase    Specimen: Blood   Result Value Ref Range    Lipase 24 13 - 60 U/L   Urinalysis With Microscopic If Indicated (No Culture) -    Specimen: Urine   Result Value Ref Range    Color, UA Yellow Yellow, Straw    Appearance, UA Clear Clear    pH, UA 7.5 5.0 - 8.0    Specific Gravity, UA 1.019 1.005 - 1.030    Glucose, UA Negative Negative    Ketones, UA Negative Negative    Bilirubin, UA Negative Negative    Blood, UA Negative Negative    Protein, UA Negative Negative    Leuk Esterase, UA Negative Negative    Nitrite, UA Negative Negative    Urobilinogen, UA 1.0 E.U./dL 0.2 - 1.0 E.U./dL   CBC Auto Differential    Specimen: Blood   Result Value Ref Range    WBC 5.76 3.40 - 10.80 10*3/mm3    RBC 3.95 3.77 - 5.28 10*6/mm3    Hemoglobin 12.0 12.0 - 15.9 g/dL    Hematocrit  36.2 34.0 - 46.6 %    MCV 91.6 79.0 - 97.0 fL    MCH 30.4 26.6 - 33.0 pg    MCHC 33.1 31.5 - 35.7 g/dL    RDW 11.7 (L) 12.3 - 15.4 %    RDW-SD 39.8 37.0 - 54.0 fl    MPV 9.5 6.0 - 12.0 fL    Platelets 205 140 - 450 10*3/mm3    Neutrophil % 64.8 42.7 - 76.0 %    Lymphocyte % 23.8 19.6 - 45.3 %    Monocyte % 9.0 5.0 - 12.0 %    Eosinophil % 1.9 0.3 - 6.2 %    Basophil % 0.3 0.0 - 1.5 %    Immature Grans % 0.2 0.0 - 0.5 %    Neutrophils, Absolute 3.73 1.70 - 7.00 10*3/mm3    Lymphocytes, Absolute 1.37 0.70 - 3.10 10*3/mm3    Monocytes, Absolute 0.52 0.10 - 0.90 10*3/mm3    Eosinophils, Absolute 0.11 0.00 - 0.40 10*3/mm3    Basophils, Absolute 0.02 0.00 - 0.20 10*3/mm3    Immature Grans, Absolute 0.01 0.00 - 0.05 10*3/mm3    nRBC 0.0 0.0 - 0.2 /100 WBC   Green Top (Gel)   Result Value Ref Range    Extra Tube Hold for add-ons.    Lavender Top   Result Value Ref Range    Extra Tube hold for add-on    Gold Top - SST   Result Value Ref Range    Extra Tube Hold for add-ons.    Light Blue Top   Result Value Ref Range    Extra Tube Hold for add-ons.                Assessment and Plan:          Diagnoses and all orders for this visit:    1. Daily headache (Primary)  Assessment & Plan:  Can try excedrin migraine for more severe headaches, take Ibuprofen or Tylenol prn         2. Anxiety  Assessment & Plan:  Does not want to take rx daily, trial of hydroxyzine     Orders:  -     hydrOXYzine (ATARAX) 25 MG tablet; Take 1 tablet by mouth 3 (Three) Times a Day As Needed for Anxiety.  Dispense: 30 tablet; Refill: 1    3. Joint capsule tear  Assessment & Plan:  Reviewed MRI of right shoulder, follow up with ortho and continue PT, did complete hunting exemption for one year for pt       4. Incomplete tear of right rotator cuff, unspecified whether traumatic  Assessment & Plan:  Reviewed MRI 4-29-22., to continue PT and follow up with ortho           Follow Up   No follow-ups on file.  Patient was given instructions and counseling  regarding her condition or for health maintenance advice. Please see specific information pulled into the AVS if appropriate.

## 2022-09-01 NOTE — ASSESSMENT & PLAN NOTE
Reviewed MRI of right shoulder, follow up with ortho and continue PT, did complete hunting exemption for one year for pt

## 2022-10-03 ENCOUNTER — TELEPHONE (OUTPATIENT)
Dept: FAMILY MEDICINE CLINIC | Age: 32
End: 2022-10-03

## 2022-10-03 DIAGNOSIS — F41.9 ANXIETY: ICD-10-CM

## 2022-10-03 NOTE — TELEPHONE ENCOUNTER
Rx Refill Note  Requested Prescriptions     Pending Prescriptions Disp Refills   • hydrOXYzine (ATARAX) 25 MG tablet [Pharmacy Med Name: hydroxyzine HCl 25 mg tablet] 30 tablet 1     Sig: TAKE 1 TABLET BY MOUTH THREE TIMES DAILY AS NEEDED FOR anxiety      Last office visit with prescribing clinician: 9/1/2022      Next office visit with prescribing clinician: Visit date not found     Chrissy Peerz LPN  10/03/22, 12:23 EDT         LF-9/1/22 #30, RF-1    NO PROTOCOL ON MED. PLEASE ADV IF OK TO FILL.

## 2022-10-04 RX ORDER — HYDROXYZINE HYDROCHLORIDE 25 MG/1
TABLET, FILM COATED ORAL
Qty: 30 TABLET | Refills: 1 | OUTPATIENT
Start: 2022-10-04

## 2023-01-27 ENCOUNTER — OFFICE VISIT (OUTPATIENT)
Dept: FAMILY MEDICINE CLINIC | Age: 33
End: 2023-01-27
Payer: COMMERCIAL

## 2023-01-27 VITALS
SYSTOLIC BLOOD PRESSURE: 103 MMHG | DIASTOLIC BLOOD PRESSURE: 70 MMHG | WEIGHT: 179 LBS | HEART RATE: 71 BPM | TEMPERATURE: 98.5 F | BODY MASS INDEX: 33.79 KG/M2 | OXYGEN SATURATION: 97 % | HEIGHT: 61 IN

## 2023-01-27 DIAGNOSIS — R51.9 INTERMITTENT HEADACHE: ICD-10-CM

## 2023-01-27 DIAGNOSIS — R10.13 EPIGASTRIC PAIN: ICD-10-CM

## 2023-01-27 DIAGNOSIS — F41.9 ANXIETY: Primary | ICD-10-CM

## 2023-01-27 PROCEDURE — 99214 OFFICE O/P EST MOD 30 MIN: CPT

## 2023-01-27 RX ORDER — HYDROXYZINE HYDROCHLORIDE 25 MG/1
25 TABLET, FILM COATED ORAL 3 TIMES DAILY PRN
Qty: 30 TABLET | Refills: 0 | Status: SHIPPED | OUTPATIENT
Start: 2023-01-27

## 2023-01-27 RX ORDER — PANTOPRAZOLE SODIUM 40 MG/1
40 TABLET, DELAYED RELEASE ORAL DAILY
Qty: 30 TABLET | Refills: 0 | Status: SHIPPED | OUTPATIENT
Start: 2023-01-27

## 2023-01-27 NOTE — PROGRESS NOTES
Subjective     CHIEF COMPLAINT    Chief Complaint   Patient presents with   • Headache     X 1 week   • Med Refill     Atarax, protonix     History of Present Illness  Patient is a 32 year old female who presents to the clinic today due to headaches which she attributes to stress. She reports a lot of increased stress lately with moving and college, and she is requesting a refill of her hydroxyzine. She states this medication helps with her stress/anxiety and thus helps with her headaches. She reports sometimes the medication makes her drowsy, so she tends to take it just once daily at night. Sometimes she does not even need it daily. Denies any other side effects from the medication. Denies SI/HI. She uses Tylenol/Ibuprofen for her headaches which help. Headaches are intermittent, not the worst headaches of her life. No associated nausea/vomiting/visual changes.     She is also requesting a refill of her Protonix. She was prescribed this for abdominal pain in June of 2022. At that time, she went to the ER and had a negative RUQ US and CT scan of the abdomen. She states she does not take Protonix regularly but it does help when she takes it. She is not currently having any abdominal pain. No nausea or vomiting. No issues with bowel movements or urinary symptoms.       Review of Systems   Eyes: Negative for photophobia and visual disturbance.   Respiratory: Negative for shortness of breath and wheezing.    Cardiovascular: Negative for chest pain.   Gastrointestinal: Negative for nausea and vomiting.   Neurological: Positive for headaches. Negative for syncope, weakness and numbness.     No Known Allergies      Current Outpatient Medications on File Prior to Visit   Medication Sig Dispense Refill   • ketoconazole (Nizoral) 2 % shampoo Apply  topically to the appropriate area as directed 2 (Two) Times a Week. 100 mL 1     No current facility-administered medications on file prior to visit.       /70 (BP Location:  "Left arm, Patient Position: Sitting, Cuff Size: Adult)   Pulse 71   Temp 98.5 °F (36.9 °C) (Oral)   Ht 154.9 cm (60.98\")   Wt 81.2 kg (179 lb)   LMP 01/10/2021   SpO2 97%   BMI 33.84 kg/m²       Objective     Physical Exam  Vitals and nursing note reviewed.   Constitutional:       General: She is not in acute distress.     Appearance: Normal appearance. She is not ill-appearing.   HENT:      Head: Normocephalic and atraumatic.      Right Ear: Tympanic membrane, ear canal and external ear normal.      Left Ear: Tympanic membrane, ear canal and external ear normal.      Nose: Nose normal.      Right Sinus: No maxillary sinus tenderness or frontal sinus tenderness.      Left Sinus: No maxillary sinus tenderness or frontal sinus tenderness.      Mouth/Throat:      Mouth: Mucous membranes are moist.      Pharynx: No posterior oropharyngeal erythema.   Eyes:      Extraocular Movements: Extraocular movements intact.      Pupils: Pupils are equal, round, and reactive to light.   Cardiovascular:      Rate and Rhythm: Normal rate and regular rhythm.      Heart sounds: Normal heart sounds. No murmur heard.  Pulmonary:      Effort: Pulmonary effort is normal. No accessory muscle usage or respiratory distress.      Breath sounds: Normal breath sounds. No wheezing or rhonchi.   Musculoskeletal:      Cervical back: Normal range of motion.   Lymphadenopathy:      Cervical: No cervical adenopathy.   Skin:     General: Skin is warm and dry.   Neurological:      General: No focal deficit present.      Mental Status: She is alert and oriented to person, place, and time.      Cranial Nerves: No facial asymmetry.      Motor: No weakness.      Gait: Gait is intact.   Psychiatric:         Mood and Affect: Mood and affect normal.         Behavior: Behavior normal.             Diagnoses and all orders for this visit:    1. Anxiety (Primary)  -     hydrOXYzine (ATARAX) 25 MG tablet; Take 1 tablet by mouth 3 (Three) Times a Day As " Needed for Anxiety.  Dispense: 30 tablet; Refill: 0    2. Epigastric pain  -     pantoprazole (PROTONIX) 40 MG EC tablet; Take 1 tablet by mouth Daily.  Dispense: 30 tablet; Refill: 0    3. Intermittent headache  Comments:  Relates to stress, continue Tylenol/Ibuprofen PRN. Ice packs, dark room. Red flag symptoms reviewed with patient    Will give 30 pills of Protonix and Hydroxyzine, patient needs to follow up with PCP, will set up appointment. Follow up sooner PRN. Patient voiced understanding of all instructions and had no further questions at this time.       Follow up:   Return in 1 week (on 2/3/2023), or if symptoms worsen or fail to improve.  Patient was given instructions and counseling regarding her condition or for health maintenance advice. Please see specific information pulled into the AVS if appropriate.

## 2023-05-17 ENCOUNTER — OFFICE VISIT (OUTPATIENT)
Dept: FAMILY MEDICINE CLINIC | Age: 33
End: 2023-05-17
Payer: COMMERCIAL

## 2023-05-17 ENCOUNTER — LAB (OUTPATIENT)
Dept: LAB | Facility: HOSPITAL | Age: 33
End: 2023-05-17
Payer: COMMERCIAL

## 2023-05-17 VITALS — OXYGEN SATURATION: 97 % | TEMPERATURE: 98 F | BODY MASS INDEX: 34.63 KG/M2 | WEIGHT: 183.4 LBS | HEIGHT: 61 IN

## 2023-05-17 DIAGNOSIS — R42 DIZZINESS: Primary | ICD-10-CM

## 2023-05-17 DIAGNOSIS — R42 DIZZINESS: ICD-10-CM

## 2023-05-17 LAB
ALBUMIN SERPL-MCNC: 4.4 G/DL (ref 3.5–5.2)
ALBUMIN/GLOB SERPL: 1.6 G/DL
ALP SERPL-CCNC: 60 U/L (ref 39–117)
ALT SERPL W P-5'-P-CCNC: 9 U/L (ref 1–33)
ANION GAP SERPL CALCULATED.3IONS-SCNC: 8 MMOL/L (ref 5–15)
AST SERPL-CCNC: 13 U/L (ref 1–32)
BILIRUB SERPL-MCNC: 0.6 MG/DL (ref 0–1.2)
BUN SERPL-MCNC: 16 MG/DL (ref 6–20)
BUN/CREAT SERPL: 17 (ref 7–25)
CALCIUM SPEC-SCNC: 9.4 MG/DL (ref 8.6–10.5)
CHLORIDE SERPL-SCNC: 103 MMOL/L (ref 98–107)
CO2 SERPL-SCNC: 27 MMOL/L (ref 22–29)
CREAT SERPL-MCNC: 0.94 MG/DL (ref 0.57–1)
DEPRECATED RDW RBC AUTO: 45.3 FL (ref 37–54)
EGFRCR SERPLBLD CKD-EPI 2021: 82.9 ML/MIN/1.73
ERYTHROCYTE [DISTWIDTH] IN BLOOD BY AUTOMATED COUNT: 12.8 % (ref 12.3–15.4)
GLOBULIN UR ELPH-MCNC: 2.8 GM/DL
GLUCOSE SERPL-MCNC: 88 MG/DL (ref 65–99)
HCT VFR BLD AUTO: 38.3 % (ref 34–46.6)
HGB BLD-MCNC: 12.4 G/DL (ref 12–15.9)
MCH RBC QN AUTO: 30.8 PG (ref 26.6–33)
MCHC RBC AUTO-ENTMCNC: 32.4 G/DL (ref 31.5–35.7)
MCV RBC AUTO: 95.3 FL (ref 79–97)
PLATELET # BLD AUTO: 237 10*3/MM3 (ref 140–450)
PMV BLD AUTO: 9.3 FL (ref 6–12)
POTASSIUM SERPL-SCNC: 4.1 MMOL/L (ref 3.5–5.2)
PROT SERPL-MCNC: 7.2 G/DL (ref 6–8.5)
RBC # BLD AUTO: 4.02 10*6/MM3 (ref 3.77–5.28)
SODIUM SERPL-SCNC: 138 MMOL/L (ref 136–145)
TSH SERPL DL<=0.05 MIU/L-ACNC: 22.6 UIU/ML (ref 0.27–4.2)
WBC NRBC COR # BLD: 7.41 10*3/MM3 (ref 3.4–10.8)

## 2023-05-17 PROCEDURE — 99213 OFFICE O/P EST LOW 20 MIN: CPT | Performed by: NURSE PRACTITIONER

## 2023-05-17 PROCEDURE — 1159F MED LIST DOCD IN RCRD: CPT | Performed by: NURSE PRACTITIONER

## 2023-05-17 PROCEDURE — 84443 ASSAY THYROID STIM HORMONE: CPT

## 2023-05-17 PROCEDURE — 80053 COMPREHEN METABOLIC PANEL: CPT

## 2023-05-17 PROCEDURE — 36415 COLL VENOUS BLD VENIPUNCTURE: CPT

## 2023-05-17 PROCEDURE — 85027 COMPLETE CBC AUTOMATED: CPT

## 2023-05-17 PROCEDURE — 1160F RVW MEDS BY RX/DR IN RCRD: CPT | Performed by: NURSE PRACTITIONER

## 2023-05-17 RX ORDER — MECLIZINE HCL 12.5 MG/1
12.5 TABLET ORAL 3 TIMES DAILY PRN
Qty: 30 TABLET | Refills: 0 | Status: SHIPPED | OUTPATIENT
Start: 2023-05-17

## 2023-05-17 RX ORDER — PENICILLIN V POTASSIUM 500 MG/1
TABLET ORAL
COMMUNITY
Start: 2023-03-01

## 2023-05-17 NOTE — PROGRESS NOTES
"Chief Complaint  Dizziness (& light headed. Ongoing since last night. Pt states she was sitting in recliner watching tv. The room started spinning and both eyes were twitching. Episode lasted for \"hours\" )    Subjective          Hanna Matos presents to [facility]  History of Present Illness  She was sitting in her recliner and started having vertigo. She reports that others told her that her eyes were spinning. She reports that she did have nausea, but no vomiting. Denies changes in vision and slurred speech.  She reports \"that I feel weird\" and that she has a numbness all over her body.  Denies URI symptoms.       Objective   Vital Signs:   Temp 98 °F (36.7 °C) (Oral)   Ht 154.9 cm (60.98\")   Wt 83.2 kg (183 lb 6.4 oz)   SpO2 97% Comment: room air  BMI 34.67 kg/m²      Vitals:    05/17/23 1301 05/17/23 1308 05/17/23 1310   Orthostatic BP: 105/57 105/55 111/52   Orthostatic Pulse: 67 75 74   Patient Position: Lying Sitting Standing       Physical Exam  Constitutional:       General: She is not in acute distress.     Appearance: Normal appearance. She is normal weight.   HENT:      Head: Normocephalic.      Right Ear: Tympanic membrane, ear canal and external ear normal.      Left Ear: Tympanic membrane, ear canal and external ear normal.   Eyes:      Pupils: Pupils are equal, round, and reactive to light.      Visual Fields: Right eye visual fields normal and left eye visual fields normal.   Neck:      Trachea: Trachea normal.   Cardiovascular:      Rate and Rhythm: Normal rate and regular rhythm.      Heart sounds: Normal heart sounds.   Pulmonary:      Effort: Pulmonary effort is normal.      Breath sounds: Normal breath sounds and air entry.   Musculoskeletal:      Right lower leg: No edema.      Left lower leg: No edema.   Skin:     General: Skin is warm and dry.   Neurological:      Mental Status: She is alert and oriented to person, place, and time.      Comments: " Negative Hallpike-Gabe manuever   Psychiatric:         Mood and Affect: Mood and affect normal.         Behavior: Behavior normal.         Thought Content: Thought content normal.        Result Review :   The following data was reviewed by: TRINI Xiong on 05/17/2023:                  Assessment and Plan    Diagnoses and all orders for this visit:    1. Dizziness (Primary)  -     meclizine (ANTIVERT) 12.5 MG tablet; Take 1 tablet by mouth 3 (Three) Times a Day As Needed for Dizziness.  Dispense: 30 tablet; Refill: 0  -     CBC (No Diff); Future  -     Comprehensive Metabolic Panel; Future  -     TSH; Future  -     Ambulatory Referral to Physical Therapy    Will check some labs.  She is negative for BPPV and orthostatic hypotension.  We have discussed that at this time, and her symptoms do not warrant imaging.  If she develops difficulty speaking, balance issues, etc., may consider imaging of the brain.  We will give her a trial of meclizine and refer her to physical therapy.  We will also have her follow-up with her PCP as it has been a while since she was last seen.      Follow Up   No follow-ups on file.  Patient was given instructions and counseling regarding her condition or for health maintenance advice. Please see specific information pulled into the AVS if appropriate.

## 2023-05-18 NOTE — PROGRESS NOTES
She had complaints of dizziness, so I did basic labs and found that her TSH was elevated. It's been a while since she's seen you (she's canceled multiple appts with you), so she has a follow-up with you next week.

## 2023-05-24 ENCOUNTER — OFFICE VISIT (OUTPATIENT)
Dept: FAMILY MEDICINE CLINIC | Age: 33
End: 2023-05-24
Payer: COMMERCIAL

## 2023-05-24 VITALS
HEART RATE: 82 BPM | BODY MASS INDEX: 34.81 KG/M2 | SYSTOLIC BLOOD PRESSURE: 119 MMHG | WEIGHT: 184.4 LBS | DIASTOLIC BLOOD PRESSURE: 63 MMHG | RESPIRATION RATE: 16 BRPM | HEIGHT: 61 IN

## 2023-05-24 DIAGNOSIS — Z11.59 SCREENING FOR VIRAL DISEASE: ICD-10-CM

## 2023-05-24 DIAGNOSIS — E03.9 ACQUIRED HYPOTHYROIDISM: Primary | ICD-10-CM

## 2023-05-24 DIAGNOSIS — F41.9 ANXIETY: ICD-10-CM

## 2023-05-24 DIAGNOSIS — R21 RASH: ICD-10-CM

## 2023-05-24 PROCEDURE — 1160F RVW MEDS BY RX/DR IN RCRD: CPT | Performed by: NURSE PRACTITIONER

## 2023-05-24 PROCEDURE — 1159F MED LIST DOCD IN RCRD: CPT | Performed by: NURSE PRACTITIONER

## 2023-05-24 RX ORDER — LEVOTHYROXINE SODIUM 0.03 MG/1
25 TABLET ORAL
Qty: 30 TABLET | Refills: 1 | Status: SHIPPED | OUTPATIENT
Start: 2023-05-24

## 2023-05-24 RX ORDER — HYDROXYZINE HYDROCHLORIDE 25 MG/1
25 TABLET, FILM COATED ORAL 3 TIMES DAILY PRN
Qty: 90 TABLET | Refills: 0 | Status: SHIPPED | OUTPATIENT
Start: 2023-05-24

## 2023-05-24 RX ORDER — KETOCONAZOLE 20 MG/ML
SHAMPOO TOPICAL 2 TIMES WEEKLY
Qty: 120 ML | Refills: 1 | Status: SHIPPED | OUTPATIENT
Start: 2023-05-25

## 2023-05-24 NOTE — PROGRESS NOTES
Hanna Matos presents to [facility].    Chief Complaint:  Dizziness (Follow up from office visit with Renea Conde on 23. Reports still having dizzy spells. ) and Results (Lab results)         History of Present Illness:  Dizziness  Symptoms started: > 1 week ago  Description of symptoms: lightheaded and hot sensation, off balance, spinning sensation and nauseated  Intermittent occurs once every 2 days, the severe symptoms, but has daily dizziness   Associated symptoms: nausea, eyes bother her when she has spells   Treatment tried: meclizine, not sure it helps much     Hypothyroidism  Current rx: none     Lab Results       Component                Value               Date                       TSH                      22.600 (H)          2023              PAST MEDICAL HISTORY changes since :         Asthma: dx'd at age 18 mos;     Pneumonia: hospitalized;     Urinary Tract Infections, Recurrent: dx'd at age 4; hospitilized;     Chicken pox     Cervical cancer         GYNECOLOGICAL HISTORY:     miscarriage 1    No problems with menstrual cycles.    Contraception: S/P tubal ligation;    Menarche occurred at age 15 yrs.    (+) hx of abnormal Pap ( she has undergone LEEP ) Sexually Active? yes         CURRENT MEDICAL PROVIDERS:    Obstetrician/Gynecologist: Dr BOZENA alegre-- for pylonephritis     miscarrage X1         Surgical History:        pilonidal cyst 10-   Hysterectomy , Kumar    Bilateral Tubal Ligation    R CTS ;         Family History:         Paternal Grandfather: Congestive Heart Failure (  );  Type 2 Diabetes     Maternal Grandfather: Lung Cancer (  )   Father:  at age 52; Cause of death was lymphoma    ; Positive for Myocardial Infarction;     Mother: hypothyroidism, Positive for ADD/ADHD;   Brothers: 2, T1DM     Son(s): Healthy; 2 son(s) total         Social History:     Occupation:. 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 Negative for fever.   HENT: Negative for congestion and postnasal drip.    Eyes:        Feels like they move when she has this spell    Respiratory: Negative for cough and shortness of breath.    Cardiovascular: Negative for chest pain, palpitations and leg swelling.   Gastrointestinal: Positive for nausea.   Skin:        Needs refill of shampoo    Neurological: Positive for headache (occ ). Negative for numbness.   Psychiatric/Behavioral: Positive for stress (anxiety, takes hydroxyzine, needs refills, helps ).          Current Outpatient Medications:   •  hydrOXYzine (ATARAX) 25 MG tablet, Take 1 tablet by mouth 3 (Three) Times a Day As Needed for Anxiety., Disp: 90 tablet, Rfl: 0  •  [START ON 5/25/2023] ketoconazole (Nizoral) 2 % shampoo, Apply  topically to the appropriate area as directed 2 (Two) Times a Week., Disp: 120 mL, Rfl: 1  •  meclizine (ANTIVERT) 12.5 MG tablet, Take 1 tablet by mouth 3 (Three) Times a Day As Needed for dizziness, Disp: 30 tablet, Rfl: 0  •  levothyroxine (SYNTHROID, LEVOTHROID) 25 MCG tablet, Take 1 tablet by mouth Every Morning., Disp: 30 tablet, Rfl: 1    Vital Signs:   Vitals:    05/24/23 0840   BP: 119/63   BP Location: Right arm   Patient Position: Sitting   Cuff Size: Adult   Pulse: 82   Resp: 16   Weight: 83.6 kg (184 lb 6.4 oz)   Height: 154.9 cm (60.98\")   PainSc: 0-No pain         Physical Exam:  Physical Exam  Vitals reviewed.   Constitutional:       General: She is not in acute distress.     Appearance: Normal appearance.   HENT:      Right Ear: Tympanic membrane normal.      Left Ear: Tympanic membrane normal.      Nose: Nose normal.      Mouth/Throat:      Pharynx: Oropharynx is clear.   Eyes:      Extraocular Movements: Extraocular movements intact.      Pupils: Pupils are equal, round, and reactive " to light.   Neck:      Vascular: No carotid bruit.   Cardiovascular:      Rate and Rhythm: Normal rate and regular rhythm.      Heart sounds: Normal heart sounds. No murmur heard.  Pulmonary:      Effort: Pulmonary effort is normal. No respiratory distress.      Breath sounds: Normal breath sounds.   Musculoskeletal:      Right lower leg: No edema.      Left lower leg: No edema.   Neurological:      General: No focal deficit present.      Mental Status: She is alert.      Comments: Equal lower ext reflexes    Psychiatric:         Mood and Affect: Mood normal.         Behavior: Behavior normal.         Result Review      The following data was reviewed by: TRINI Fontanez on 05/24/2023:    Results for orders placed or performed in visit on 05/17/23   CBC (No Diff)    Specimen: Blood   Result Value Ref Range    WBC 7.41 3.40 - 10.80 10*3/mm3    RBC 4.02 3.77 - 5.28 10*6/mm3    Hemoglobin 12.4 12.0 - 15.9 g/dL    Hematocrit 38.3 34.0 - 46.6 %    MCV 95.3 79.0 - 97.0 fL    MCH 30.8 26.6 - 33.0 pg    MCHC 32.4 31.5 - 35.7 g/dL    RDW 12.8 12.3 - 15.4 %    RDW-SD 45.3 37.0 - 54.0 fl    MPV 9.3 6.0 - 12.0 fL    Platelets 237 140 - 450 10*3/mm3   Comprehensive Metabolic Panel    Specimen: Blood   Result Value Ref Range    Glucose 88 65 - 99 mg/dL    BUN 16 6 - 20 mg/dL    Creatinine 0.94 0.57 - 1.00 mg/dL    Sodium 138 136 - 145 mmol/L    Potassium 4.1 3.5 - 5.2 mmol/L    Chloride 103 98 - 107 mmol/L    CO2 27.0 22.0 - 29.0 mmol/L    Calcium 9.4 8.6 - 10.5 mg/dL    Total Protein 7.2 6.0 - 8.5 g/dL    Albumin 4.4 3.5 - 5.2 g/dL    ALT (SGPT) 9 1 - 33 U/L    AST (SGOT) 13 1 - 32 U/L    Alkaline Phosphatase 60 39 - 117 U/L    Total Bilirubin 0.6 0.0 - 1.2 mg/dL    Globulin 2.8 gm/dL    A/G Ratio 1.6 g/dL    BUN/Creatinine Ratio 17.0 7.0 - 25.0    Anion Gap 8.0 5.0 - 15.0 mmol/L    eGFR 82.9 >60.0 mL/min/1.73   TSH    Specimen: Blood   Result Value Ref Range    TSH 22.600 (H) 0.270 - 4.200 uIU/mL                Assessment and Plan:          Diagnoses and all orders for this visit:    1. Acquired hypothyroidism (Primary)  Assessment & Plan:  Discussion of her labs, hypothyroidism, rx, how to take, to start her on low dose of rx, then recheck lab in 6 weeks     Orders:  -     levothyroxine (SYNTHROID, LEVOTHROID) 25 MCG tablet; Take 1 tablet by mouth Every Morning.  Dispense: 30 tablet; Refill: 1  -     TSH Rfx On Abnormal To Free T4; Future    2. Anxiety  Assessment & Plan:  Continue hydorxyzine prn     Orders:  -     hydrOXYzine (ATARAX) 25 MG tablet; Take 1 tablet by mouth 3 (Three) Times a Day As Needed for Anxiety.  Dispense: 90 tablet; Refill: 0    3. Rash  Assessment & Plan:  Refilled her shampoo     Orders:  -     ketoconazole (Nizoral) 2 % shampoo; Apply  topically to the appropriate area as directed 2 (Two) Times a Week.  Dispense: 120 mL; Refill: 1    4. Screening for viral disease  Assessment & Plan:  Screen for hep c with her next lab     Orders:  -     Hepatitis C antibody; Future        Follow Up   Return for follow up for a TSH recheck in 6 weeks .  Patient was given instructions and counseling regarding her condition or for health maintenance advice. Please see specific information pulled into the AVS if appropriate.

## 2023-05-24 NOTE — ASSESSMENT & PLAN NOTE
Discussion of her labs, hypothyroidism, rx, how to take, to start her on low dose of rx, then recheck lab in 6 weeks

## 2023-06-08 ENCOUNTER — HOSPITAL ENCOUNTER (OUTPATIENT)
Dept: GENERAL RADIOLOGY | Facility: HOSPITAL | Age: 33
Discharge: HOME | End: 2023-06-08
Payer: COMMERCIAL

## 2023-06-08 ENCOUNTER — OFFICE VISIT (OUTPATIENT)
Dept: FAMILY MEDICINE CLINIC | Age: 33
End: 2023-06-08
Payer: COMMERCIAL

## 2023-06-08 VITALS
BODY MASS INDEX: 34.55 KG/M2 | HEIGHT: 61 IN | WEIGHT: 183 LBS | DIASTOLIC BLOOD PRESSURE: 68 MMHG | HEART RATE: 67 BPM | SYSTOLIC BLOOD PRESSURE: 102 MMHG

## 2023-06-08 DIAGNOSIS — M25.571 ACUTE RIGHT ANKLE PAIN: ICD-10-CM

## 2023-06-08 DIAGNOSIS — M25.571 ACUTE RIGHT ANKLE PAIN: Primary | ICD-10-CM

## 2023-06-08 PROCEDURE — 73610 X-RAY EXAM OF ANKLE: CPT

## 2023-06-08 PROCEDURE — 99213 OFFICE O/P EST LOW 20 MIN: CPT | Performed by: PHYSICIAN ASSISTANT

## 2023-06-08 NOTE — PROGRESS NOTES
Diagnoses and all orders for this visit:    1. Acute right ankle pain (Primary)  Comments:  No acute fractures on x-ray.  Soft tissue injury with contusion.  Recommend RICE care.  Orders:  -     XR Ankle 3+ View Right; Future            Subjective     CHIEF COMPLAINT    Chief Complaint   Patient presents with    Ankle Injury            History of Present Illness  This is a 32-year-old female presenting the clinic complaining of right ankle pain for the last 2 days.  She was unloading a plastic folding table from a truck and states it fell directly onto her ankle.  She has noted bruising and states is very tender to the touch.  She is able to walk on it with minimal pain.  She mainly came in because she is concerned about a potential fracture.          Review of Systems   Constitutional:  Negative for chills and fever.   Musculoskeletal:  Positive for arthralgias (Right ankle). Negative for gait problem, joint swelling and myalgias.   Skin:  Negative for wound.   Neurological:  Negative for weakness and numbness.          Past Medical History:   Diagnosis Date    Acute maxillary sinusitis, unspecified     Acute vaginitis     Amenorrhea, unspecified     Anemia     Asthma     NO INHALERS    Carpal tunnel syndrome     RIGHT/SURGERY 02/2021    Cervical cancer     Cervicalgia     Endometriosis     ABN CELLS, CYSTS    Frequency of micturition     Localized swelling, mass and lump, head     Low back pain     Migraine with aura and without status migrainosus, not intractable     Noninflammatory disorder of vagina, unspecified     Other mixed anxiety disorders     Pain in right shoulder     Pain in thoracic spine     Pilonidal cyst with abscess     Pneumonia     PONV (postoperative nausea and vomiting)     Recurrent UTI     diagnosed at age 4            Past Surgical History:   Procedure Laterality Date    CARPAL TUNNEL RELEASE Right 1/15/2021    Procedure: Right Carpal Tunnel Release;  Surgeon: Osvaldo Strong MD;   Location:  [facility];  Service: Orthopedics;  Laterality: Right;    CERVICAL BIOPSY  W/ LOOP ELECTRODE EXCISION      PILONIDAL CYST DRAINAGE      TUBAL ABDOMINAL LIGATION      VAGINAL HYSTERECTOMY N/A 2021    Procedure: TOTAL VAGINAL HYSTERECTOMY;  Surgeon: Que Bo MD;  Location: [facility];  Service: Gynecology;  Laterality: N/A;            Family History   Problem Relation Age of Onset    Heart disease Other     Diabetes Other     Lung cancer Other     Lymphoma Other     Breast cancer Other     Hypertension Other     ADD / ADHD Other     Lymphoma Father          at age 52    Lung cancer Maternal Grandfather     Heart failure Paternal Grandfather     Diabetes type II Paternal Grandfather     Malig Hyperthermia Neg Hx             Social History     Socioeconomic History    Marital status: Single    Number of children: 2   Tobacco Use    Smoking status: Every Day     Packs/day: 0.50     Years: 22.00     Pack years: 11.00     Types: Cigarettes    Smokeless tobacco: Never   Vaping Use    Vaping Use: Every day    Substances: Nicotine, Flavoring    Devices: Disposable   Substance and Sexual Activity    Alcohol use: Never    Drug use: Never    Sexual activity: Defer            No Known Allergies         Current Outpatient Medications on File Prior to Visit   Medication Sig Dispense Refill    hydrOXYzine (ATARAX) 25 MG tablet Take 1 tablet by mouth 3 (Three) Times a Day As Needed for Anxiety. 90 tablet 0    ketoconazole (Nizoral) 2 % shampoo Apply  topically to the appropriate area as directed 2 (Two) Times a Week. 120 mL 1    levothyroxine (SYNTHROID, LEVOTHROID) 25 MCG tablet Take 1 tablet by mouth Every Morning. 30 tablet 1    meclizine (ANTIVERT) 12.5 MG tablet Take 1 tablet by mouth 3 (Three) Times a Day As Needed for dizziness 30 tablet 0     No current facility-administered medications on file prior to visit.            /68 (BP Location: Left arm, Patient Position: Sitting)   Pulse  "67   Ht 154.9 cm (61\")   Wt 83 kg (183 lb)   LMP 01/10/2021   BMI 34.58 kg/m²          Objective     Physical Exam  Vitals and nursing note reviewed.   Constitutional:       General: She is not in acute distress.     Appearance: Normal appearance.   Pulmonary:      Effort: Pulmonary effort is normal. No respiratory distress.   Musculoskeletal:      Right ankle: Ecchymosis present. No swelling or deformity. Tenderness present over the medial malleolus. Normal range of motion. Normal pulse.      Right Achilles Tendon: No tenderness.   Skin:     General: Skin is warm and dry.      Findings: No erythema.   Neurological:      Mental Status: She is alert and oriented to person, place, and time.   Psychiatric:         Mood and Affect: Mood normal.         Behavior: Behavior normal.            Procedures                    Lab Results (last 24 hours)       ** No results found for the last 24 hours. **                  XR Ankle 3+ View Right    Result Date: 6/8/2023  PROCEDURE: XR ANKLE 3+ VW RIGHT  COMPARISON: None  INDICATIONS: RIGHT MEDIAL ANKLE PAIN AND SWELLING AFTER INJURY 3 DAYS AGO  FINDINGS:  BONES: Normal.  No significant arthropathy or acute abnormality.  SOFT TISSUES: Negative.  No visible soft tissue swelling.  EFFUSION: None visible.        No acute disease.    SALOMÓN PURDY MD       Electronically Signed and Approved By: SALOMÓN PURDY MD on 6/08/2023 at 14:13                              Diagnoses and all orders for this visit:    1. Acute right ankle pain (Primary)  Comments:  No acute fractures on x-ray.  Soft tissue injury with contusion.  Recommend RICE care.  Orders:  -     XR Ankle 3+ View Right; Future                           FOR FULL DISCHARGE INSTRUCTIONS/COMMENTS/HANDOUTS please see the   AVS      "

## 2023-07-07 PROBLEM — Z11.59 SCREENING FOR VIRAL DISEASE: Status: RESOLVED | Noted: 2023-05-24 | Resolved: 2023-07-07

## 2023-08-09 ENCOUNTER — OFFICE VISIT (OUTPATIENT)
Dept: FAMILY MEDICINE CLINIC | Age: 33
End: 2023-08-09
Payer: COMMERCIAL

## 2023-08-09 ENCOUNTER — LAB (OUTPATIENT)
Dept: LAB | Facility: HOSPITAL | Age: 33
End: 2023-08-09
Payer: COMMERCIAL

## 2023-08-09 VITALS
WEIGHT: 184.4 LBS | HEART RATE: 73 BPM | SYSTOLIC BLOOD PRESSURE: 115 MMHG | HEIGHT: 61 IN | DIASTOLIC BLOOD PRESSURE: 76 MMHG | BODY MASS INDEX: 34.81 KG/M2

## 2023-08-09 DIAGNOSIS — E03.9 ACQUIRED HYPOTHYROIDISM: ICD-10-CM

## 2023-08-09 DIAGNOSIS — E03.9 ACQUIRED HYPOTHYROIDISM: Primary | ICD-10-CM

## 2023-08-09 DIAGNOSIS — R42 DIZZINESS: ICD-10-CM

## 2023-08-09 LAB
ALBUMIN SERPL-MCNC: 4.1 G/DL (ref 3.5–5.2)
ALBUMIN/GLOB SERPL: 1.5 G/DL
ALP SERPL-CCNC: 66 U/L (ref 39–117)
ALT SERPL W P-5'-P-CCNC: 7 U/L (ref 1–33)
ANION GAP SERPL CALCULATED.3IONS-SCNC: 10.2 MMOL/L (ref 5–15)
AST SERPL-CCNC: 10 U/L (ref 1–32)
BASOPHILS # BLD AUTO: 0.04 10*3/MM3 (ref 0–0.2)
BASOPHILS NFR BLD AUTO: 0.4 % (ref 0–1.5)
BILIRUB SERPL-MCNC: 0.3 MG/DL (ref 0–1.2)
BUN SERPL-MCNC: 15 MG/DL (ref 6–20)
BUN/CREAT SERPL: 19.5 (ref 7–25)
CALCIUM SPEC-SCNC: 9.6 MG/DL (ref 8.6–10.5)
CHLORIDE SERPL-SCNC: 102 MMOL/L (ref 98–107)
CO2 SERPL-SCNC: 27.8 MMOL/L (ref 22–29)
CREAT SERPL-MCNC: 0.77 MG/DL (ref 0.57–1)
DEPRECATED RDW RBC AUTO: 42.2 FL (ref 37–54)
EGFRCR SERPLBLD CKD-EPI 2021: 105.3 ML/MIN/1.73
EOSINOPHIL # BLD AUTO: 0.21 10*3/MM3 (ref 0–0.4)
EOSINOPHIL NFR BLD AUTO: 2.3 % (ref 0.3–6.2)
ERYTHROCYTE [DISTWIDTH] IN BLOOD BY AUTOMATED COUNT: 12.1 % (ref 12.3–15.4)
GLOBULIN UR ELPH-MCNC: 2.8 GM/DL
GLUCOSE SERPL-MCNC: 95 MG/DL (ref 65–99)
HCT VFR BLD AUTO: 39.7 % (ref 34–46.6)
HGB BLD-MCNC: 13 G/DL (ref 12–15.9)
IMM GRANULOCYTES # BLD AUTO: 0.02 10*3/MM3 (ref 0–0.05)
IMM GRANULOCYTES NFR BLD AUTO: 0.2 % (ref 0–0.5)
LYMPHOCYTES # BLD AUTO: 2.52 10*3/MM3 (ref 0.7–3.1)
LYMPHOCYTES NFR BLD AUTO: 27.9 % (ref 19.6–45.3)
MCH RBC QN AUTO: 31 PG (ref 26.6–33)
MCHC RBC AUTO-ENTMCNC: 32.7 G/DL (ref 31.5–35.7)
MCV RBC AUTO: 94.7 FL (ref 79–97)
MONOCYTES # BLD AUTO: 0.62 10*3/MM3 (ref 0.1–0.9)
MONOCYTES NFR BLD AUTO: 6.9 % (ref 5–12)
NEUTROPHILS NFR BLD AUTO: 5.62 10*3/MM3 (ref 1.7–7)
NEUTROPHILS NFR BLD AUTO: 62.3 % (ref 42.7–76)
PLATELET # BLD AUTO: 245 10*3/MM3 (ref 140–450)
PMV BLD AUTO: 9.6 FL (ref 6–12)
POTASSIUM SERPL-SCNC: 4.2 MMOL/L (ref 3.5–5.2)
PROT SERPL-MCNC: 6.9 G/DL (ref 6–8.5)
RBC # BLD AUTO: 4.19 10*6/MM3 (ref 3.77–5.28)
SODIUM SERPL-SCNC: 140 MMOL/L (ref 136–145)
TSH SERPL DL<=0.05 MIU/L-ACNC: 2.56 UIU/ML (ref 0.27–4.2)
WBC NRBC COR # BLD: 9.03 10*3/MM3 (ref 3.4–10.8)

## 2023-08-09 PROCEDURE — 80053 COMPREHEN METABOLIC PANEL: CPT

## 2023-08-09 PROCEDURE — 99214 OFFICE O/P EST MOD 30 MIN: CPT | Performed by: NURSE PRACTITIONER

## 2023-08-09 PROCEDURE — 84443 ASSAY THYROID STIM HORMONE: CPT

## 2023-08-09 PROCEDURE — 85025 COMPLETE CBC W/AUTO DIFF WBC: CPT

## 2023-08-09 PROCEDURE — 36415 COLL VENOUS BLD VENIPUNCTURE: CPT

## 2023-08-09 NOTE — PROGRESS NOTES
Hanna Matos presents to [facility].    Chief Complaint:  Hypothyroidism & dizzy          History of Present Illness:  Dizziness  Symptoms started: yesterday (persist today) but did wake up and felt great then symptoms started again earlier this am; last previous episode was in May 2023)   Description of symptoms: occ / once per day spinning, now just feels light headed/off balance   Associated symptoms: fatigue  Treatment tried: nothing, but has meclizine       Hypothyroidism  Current rx levo 25 mcg on empty stomach first thing in am   Tolerating rx: yes   Refills needed No  Lab Results       Component                Value               Date                       TSH                      4.210 (H)           2023                PAST MEDICAL HISTORY changes since 2023:         Asthma: dx'd at age 18 mos;     Pneumonia: hospitalized;     Urinary Tract Infections, Recurrent: dx'd at age 4; hospitilized;     Chicken pox     Cervical cancer         GYNECOLOGICAL HISTORY:     miscarriage 1    No problems with menstrual cycles.    Contraception: S/P tubal ligation;    Menarche occurred at age 15 yrs.    (+) hx of abnormal Pap ( she has undergone LEEP ) Sexually Active? yes         CURRENT MEDICAL PROVIDERS:    Obstetrician/Gynecologist: Dr BOZENA alegre-- for pylonephritis     miscarrage X1         Surgical History:        pilonidal cyst 10-   Hysterectomy , Kumar    Bilateral Tubal Ligation    R CTS ;         Family History:         Paternal Grandfather: Congestive Heart Failure (  );  Type 2 Diabetes     Maternal Grandfather: Lung Cancer (  )   Father:  at age 52; Cause of death was lymphoma    ; Positive for Myocardial Infarction;     Mother: hypothyroidism, Positive for ADD/ADHD;   Brothers: 2, T1DM     Son(s): Healthy; 2 son(s) total         Social History:     Occupation:. 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   Respiratory:  Negative for cough and shortness of breath.    Cardiovascular:  Negative for chest pain, palpitations and leg swelling.   Gastrointestinal:  Positive for nausea. Negative for vomiting.   Neurological:  Positive for headache (constant dull, occipital area).        Current Outpatient Medications:     hydrOXYzine (ATARAX) 25 MG tablet, Take 1 tablet by mouth 3 (Three) Times a Day As Needed for Anxiety., Disp: 90 tablet, Rfl: 0    ketoconazole (Nizoral) 2 % shampoo, Apply  topically to the appropriate area as directed 2 (Two) Times a Week., Disp: 120 mL, Rfl: 1    levothyroxine (SYNTHROID, LEVOTHROID) 25 MCG tablet, Take 1 tablet by mouth Every Morning., Disp: 30 tablet, Rfl: 1    meclizine (ANTIVERT) 12.5 MG tablet, Take 1 tablet by mouth 3 (Three) Times a Day As Needed for dizziness, Disp: 30 tablet, Rfl: 0    Vital Signs:   Vitals:    08/09/23 1243 08/09/23 1301 08/09/23 1302 08/09/23 1306   BP: 115/76      BP Location: Left arm Right arm Right arm Right arm   Patient Position: Sitting Lying Sitting Standing   Pulse: 73      Weight: 83.6 kg (184 lb 6.4 oz)      Height: 154.9 cm (61\")            Vitals:    08/09/23 1243 08/09/23 1301 08/09/23 1302 08/09/23 1306   Orthostatic BP:  136/85 122/73 120/78   Orthostatic Pulse:  67 79 77   Patient Position: Sitting Lying Sitting Standing           Physical Exam:  Physical Exam  Vitals reviewed.   Constitutional:       General: She is not in acute distress.     Appearance: Normal appearance.   HENT:      Right Ear: Tympanic membrane normal.      Left Ear: Tympanic membrane normal.   Eyes:      Extraocular Movements: Extraocular movements intact.      Pupils: Pupils are equal, round, and reactive to light.   Neck:      Vascular: No carotid bruit.   Cardiovascular:      Rate and Rhythm: Normal rate " and regular rhythm.      Heart sounds: Normal heart sounds. No murmur heard.  Pulmonary:      Effort: Pulmonary effort is normal. No respiratory distress.      Breath sounds: Normal breath sounds.   Musculoskeletal:      Right lower leg: No edema.      Left lower leg: No edema.   Lymphadenopathy:      Cervical: No cervical adenopathy.   Neurological:      General: No focal deficit present.      Mental Status: She is alert.   Psychiatric:         Mood and Affect: Mood normal.         Behavior: Behavior normal.       Result Review      The following data was reviewed by: TRINI Fontanez on 08/09/2023:    Results for orders placed or performed in visit on 07/06/23   TSH Rfx On Abnormal To Free T4    Specimen: Blood   Result Value Ref Range    TSH 4.210 (H) 0.270 - 4.200 uIU/mL   Hepatitis C antibody    Specimen: Blood   Result Value Ref Range    Hepatitis C Ab Non-Reactive Non-Reactive   T4, Free    Specimen: Blood   Result Value Ref Range    Free T4 1.01 0.93 - 1.70 ng/dL               Assessment and Plan:          Diagnoses and all orders for this visit:    1. Acquired hypothyroidism (Primary)  Assessment & Plan:  Will go ahead and recheck her TSH today     Orders:  -     TSH Rfx On Abnormal To Free T4    2. Dizziness  Assessment & Plan:  Checked orthostatic BP they are good; stay well hydrated, take meclizine prn, checking some labs     Orders:  -     CBC w AUTO Differential; Future  -     Comprehensive metabolic panel; Future                 Follow Up   Return for followup pending lab results.  Patient was given instructions and counseling regarding her condition or for health maintenance advice. Please see specific information pulled into the AVS if appropriate.

## 2023-08-10 DIAGNOSIS — E03.9 ACQUIRED HYPOTHYROIDISM: Primary | ICD-10-CM

## 2023-09-07 DIAGNOSIS — E03.9 ACQUIRED HYPOTHYROIDISM: ICD-10-CM

## 2023-09-07 RX ORDER — LEVOTHYROXINE SODIUM 0.03 MG/1
25 TABLET ORAL
Qty: 30 TABLET | Refills: 1 | Status: SHIPPED | OUTPATIENT
Start: 2023-09-07

## 2023-09-25 ENCOUNTER — TELEPHONE (OUTPATIENT)
Dept: FAMILY MEDICINE CLINIC | Age: 33
End: 2023-09-25

## 2023-09-25 RX ORDER — FLUCONAZOLE 150 MG/1
150 TABLET ORAL ONCE
Qty: 1 TABLET | Refills: 0 | Status: SHIPPED | OUTPATIENT
Start: 2023-09-25 | End: 2023-09-25

## 2023-09-25 NOTE — TELEPHONE ENCOUNTER
Caller: Hanna Matos    Relationship: Self    Best call back number: [phone]    What medication are you requesting: PILL FOR YEAST INFECTION    What are your current symptoms: N/A    How long have you been experiencing symptoms: N/A    Have you had these symptoms before:    [x] Yes  [] No    Have you been treated for these symptoms before:   [x] Yes  [] No    If a prescription is needed, what is your preferred pharmacy and phone number: [facility] [address] [phone] [facility] [phone]      Additional notes: PATIENT HAD ORAL SURGERY ON WEDNESDAY OF LAST WEEK. SHE WAS PRESCRIBED ANTIBIOTICS AND STILL HAS TO TAKE THEM. NOW SHE IS GETTING A YEAST INFECTION AND VERY ITCHY. PLEASE SEND NEW PRESCRIPTION TO PHARMACY ASAP.

## 2023-11-10 DIAGNOSIS — E03.9 ACQUIRED HYPOTHYROIDISM: ICD-10-CM

## 2023-11-10 RX ORDER — LEVOTHYROXINE SODIUM 0.03 MG/1
25 TABLET ORAL
Qty: 90 TABLET | Refills: 1 | Status: SHIPPED | OUTPATIENT
Start: 2023-11-10

## 2023-11-10 NOTE — TELEPHONE ENCOUNTER
Rx Refill Note  Requested Prescriptions     Pending Prescriptions Disp Refills    levothyroxine (SYNTHROID, LEVOTHROID) 25 MCG tablet [Pharmacy Med Name: levothyroxine 25 mcg tablet] 30 tablet 1     Sig: TAKE 1 TABLET BY MOUTH EVERY MORNING      Last office visit with prescribing clinician: 8/9/2023       Next office visit with prescribing clinician: Visit date not found       LF 10/21/23 #30    TSH Rfx On Abnormal To Free T4 (08/09/2023 13:23)       Seen Endo 9/2023    Chaparrita Manuel LPN  11/10/23, 09:40 EST

## 2023-11-10 NOTE — TELEPHONE ENCOUNTER
9/27/23  Seen by Dr Gómez    1. Hypothyroidism, diagnosed around May 2023, stable  Continue with levothyroxine 25 mcg daily to be taken first thing in the morning with water only in empty stomach. Wait for 30 to 60 minutes treating and taking other medications. Space levothyroxine at least 4 hours apart from iron/calcium supplements.

## 2023-11-10 NOTE — TELEPHONE ENCOUNTER
I referred her to an endocrinologist, see if she saw one, her last TSH was normal, I can refill, but check on this first

## 2023-11-13 NOTE — PROGRESS NOTES
"Chief Complaint  Cough (Pt c/o cough, congestion, soa. Onset for 2 weeks, still bothering her. /Pt went to urgent care last week and was tested for C/F/S and was negative for all 3.)    Subjective          Hanna Matos presents to [facility]  Cough  This is a new problem. The current episode started 1 to 4 weeks ago. The problem has been unchanged. The cough is Productive of sputum. Associated symptoms include chest pain, shortness of breath and wheezing. Pertinent negatives include no chills, ear congestion, fever, headaches, myalgias, nasal congestion, postnasal drip, rhinorrhea or sore throat. The symptoms are aggravated by cold air. Treatments tried: Mucincex, cough drops. The treatment provided no relief.       Objective   Vital Signs:   /63 (BP Location: Left arm, Patient Position: Sitting)   Pulse 82   Temp 98.2 °F (36.8 °C) (Oral)   Ht 154.9 cm (61\")   Wt 83.2 kg (183 lb 6.4 oz)   SpO2 98% Comment: room air  BMI 34.65 kg/m²     Physical Exam  Constitutional:       General: She is not in acute distress.     Appearance: Normal appearance.   HENT:      Head: Normocephalic.   Eyes:      Pupils: Pupils are equal, round, and reactive to light.      Visual Fields: Right eye visual fields normal and left eye visual fields normal.   Neck:      Trachea: Trachea normal.   Cardiovascular:      Rate and Rhythm: Normal rate and regular rhythm.      Heart sounds: Normal heart sounds.   Pulmonary:      Effort: Pulmonary effort is normal.      Breath sounds: Normal air entry. Rhonchi present.   Musculoskeletal:      Right lower leg: No edema.      Left lower leg: No edema.   Skin:     General: Skin is warm and dry.   Neurological:      Mental Status: She is alert and oriented to person, place, and time.   Psychiatric:         Mood and Affect: Mood and affect normal.         Behavior: Behavior normal.         Thought Content: Thought content normal.        Result Review : "   The following data was reviewed by: TRINI Xiong on 11/14/2023:                  Assessment and Plan    Diagnoses and all orders for this visit:    1. Acute cough (Primary)  -     albuterol sulfate  (90 Base) MCG/ACT inhaler; Inhale 2 puffs Every 4 (Four) Hours As Needed for Shortness of Air.  Dispense: 6.7 g; Refill: 0  -     methylPREDNISolone (MEDROL) 4 MG dose pack; Take as directed on package instructions.  Dispense: 21 each; Refill: 0  -     benzonatate (Tessalon Perles) 100 MG capsule; Take 1 capsule by mouth 3 (Three) Times a Day As Needed for Cough.  Dispense: 30 capsule; Refill: 0  -     XR Chest PA & Lateral; Future    We will obtain chest x-ray today.  We will send in Medrol pack and albuterol and see if that would help with her symptoms.  Also recommend taking Tessalon Perles at night to help her sleep.  We have discussed that cough is generally the last symptom to resolve from an upper respiratory symptom and can last for several weeks.      Follow Up   Return if symptoms worsen or fail to improve.  Patient was given instructions and counseling regarding her condition or for health maintenance advice. Please see specific information pulled into the AVS if appropriate.

## 2023-11-14 ENCOUNTER — OFFICE VISIT (OUTPATIENT)
Dept: FAMILY MEDICINE CLINIC | Age: 33
End: 2023-11-14
Payer: COMMERCIAL

## 2023-11-14 ENCOUNTER — HOSPITAL ENCOUNTER (OUTPATIENT)
Dept: GENERAL RADIOLOGY | Facility: HOSPITAL | Age: 33
Discharge: HOME | End: 2023-11-14
Admitting: NURSE PRACTITIONER
Payer: COMMERCIAL

## 2023-11-14 VITALS
WEIGHT: 183.4 LBS | SYSTOLIC BLOOD PRESSURE: 106 MMHG | BODY MASS INDEX: 34.63 KG/M2 | OXYGEN SATURATION: 98 % | HEART RATE: 82 BPM | DIASTOLIC BLOOD PRESSURE: 63 MMHG | TEMPERATURE: 98.2 F | HEIGHT: 61 IN

## 2023-11-14 DIAGNOSIS — R05.1 ACUTE COUGH: ICD-10-CM

## 2023-11-14 DIAGNOSIS — R05.1 ACUTE COUGH: Primary | ICD-10-CM

## 2023-11-14 PROCEDURE — 1160F RVW MEDS BY RX/DR IN RCRD: CPT | Performed by: NURSE PRACTITIONER

## 2023-11-14 PROCEDURE — 1159F MED LIST DOCD IN RCRD: CPT | Performed by: NURSE PRACTITIONER

## 2023-11-14 PROCEDURE — 99213 OFFICE O/P EST LOW 20 MIN: CPT | Performed by: NURSE PRACTITIONER

## 2023-11-14 PROCEDURE — 71046 X-RAY EXAM CHEST 2 VIEWS: CPT

## 2023-11-14 RX ORDER — METHYLPREDNISOLONE 4 MG/1
TABLET ORAL
Qty: 21 EACH | Refills: 0 | Status: SHIPPED | OUTPATIENT
Start: 2023-11-14

## 2023-11-14 RX ORDER — BENZONATATE 100 MG/1
100 CAPSULE ORAL 3 TIMES DAILY PRN
Qty: 30 CAPSULE | Refills: 0 | Status: SHIPPED | OUTPATIENT
Start: 2023-11-14

## 2023-11-14 RX ORDER — ALBUTEROL SULFATE 90 UG/1
2 AEROSOL, METERED RESPIRATORY (INHALATION) EVERY 4 HOURS PRN
Qty: 18 G | Refills: 0 | Status: SHIPPED | OUTPATIENT
Start: 2023-11-14

## 2023-11-18 DIAGNOSIS — E03.9 ACQUIRED HYPOTHYROIDISM: ICD-10-CM

## 2023-11-18 RX ORDER — LEVOTHYROXINE SODIUM 0.03 MG/1
25 TABLET ORAL
Qty: 90 TABLET | Refills: 1 | Status: CANCELLED | OUTPATIENT
Start: 2023-11-18

## 2024-06-05 NOTE — PROGRESS NOTES
Hanna Matos  1990     Office/Outpatient Visit    Visit Date: Mon, May 10, 2021 09:36 am    Provider: Jimena Olson N.P. (Assistant: Debra Bateman,  )    Location: [facility]        Electronically signed by Jimena Olson N.P. on  05/10/2021 01:13:15 PM                             Subjective:        CC: PT NOT TAKINGIBUPROFEN, CYCLOBENZAPRINE, AUGMENTIN OR FLUCONAZOLEMs. Shawna is a 30 year old White female.  Pt following up from hospital visit to have a cyst drained. Pt is present for a referral to get the cyst removed.;         HPI:           Ms. Matos presents in follow up from ER. She was seen in the ER on 21.  She was diagnosed with Pilonidal cyst.  The following radiology tests were done: ultrasound.  The following procedures were done: lanced cyst per pt The patient received the following prescriptions: bactrim, naproxen, hydrocodone.  Had GI symptoms with Bactrim, so stopped The patient's course has not improved.  Associated symptoms include tail bone pain.      ROS:     CONSTITUTIONAL:  Negative for fever.      CARDIOVASCULAR:  Negative for chest pain, palpitations, tachycardia, orthopnea, and edema.      RESPIRATORY:  Negative for recent cough and dyspnea.      GENITOURINARY:  Positive for odor to urine and frequent urination.      NEUROLOGICAL:  Negative for dizziness, headaches, paresthesias, and weakness.          Past Medical History / Family History / Social History:         Last Reviewed on 5/10/2021 09:57 AM by Jimena Olson    Past Medical History:                 PAST MEDICAL HISTORY         Asthma: dx'd at age 18 mos;     Pneumonia: hospitalized;     Urinary Tract Infections, Recurrent: dx'd at age 4; hospitilized;     Chicken pox     Cervical cancer         GYNECOLOGICAL HISTORY:     miscarriage 1    No problems with menstrual cycles.    Contraception: S/P tubal ligation;    Menarche occurred at age 15 yrs.    (+) hx of abnormal Pap (  Pt and wife said they are having trouble getting Lyrica.  You sent it a day or two ago, but the pharmacy is telling them it is too soon to fill.  They are also saying that you told him to take it 4 times a day.  Rx says 3 times a day (as dose Willis for past couple of refills), therefore they are running out early.  They would like know why you reduced it to 3 from 4 times day and I couldn't find anything in chart. she has undergone LEEP ) Sexually Active? yes         CURRENT MEDICAL PROVIDERS:    Obstetrician/Gynecologist: Dr SEALS         CURRENT MEDICAL PROVIDERS:    hosp-- for pylonephritis     miscarrage X1         PREVENTIVE HEALTH MAINTENANCE             PAP SMEAR: was last done  with normal results         Surgical History:         Bilateral Tubal Ligation    R CTS -;         Family History:         Paternal Grandfather: Congestive Heart Failure (  );  Type 2 Diabetes     Maternal Grandfather: Lung Cancer (  ) Father:  at age 52; Cause of death was lymphoma    ; Positive for Myocardial Infarction;     Mother: Healthy    ; Positive for ADD/ADHD;     Son(s): Healthy; 2 son(s) total         Social History:     Occupation:. Homemaker     Marital Status: Engaged     Children: 2 children         Tobacco/Alcohol/Supplements:     Last Reviewed on 5/10/2021 09:39 AM by Debra Bateman    Tobacco: Current Smoker: She currently smokes every day, 1/2 pack per day; (start age 16 pack-year history).  Non-drinker         Substance Abuse History:     Last Reviewed on 2021 01:33 PM by Zainab Rich    NEGATIVE         Mental Health History:     Last Reviewed on 2021 01:33 PM by Zainab Rich        Communicable Diseases (eg STDs):     Last Reviewed on 2021 01:33 PM by Zainab Rich        Immunizations:     DTaP 1/10/1991    DTaP 1991    DTaP 1991    DTaP 3/25/1992    DTaP 1995    Td adult 2005    Td adult 2002    PedvaxHIB (Hib PRP-OMP) 1991    PedvaxHIB (Hib PRP-OMP) 1991    PedvaxHIB (Hib PRP-OMP) 1991    PedvaxHIB (Hib PRP-OMP) 3/25/1992    Hep B (pedi/adol, 3-dose schedule) 2001    Hep B (pedi/adol, 3-dose schedule) 2002    Hep B (pedi/adol, 3-dose schedule) 2003    zzGardasil 3/7/2008    zzGardasil 2008    zzGardasil 2008    OPV  Poliovirus, live (oral) 1/10/1991    OPV  Poliovirus, live (oral) 1991    OPV  Poliovirus,  live (oral) 3/25/1992    OPV  Poliovirus, live (oral) 5/1/1995    MMR  (Measles-Mumps-Rubella), live 5/25/1992    MMR  (Measles-Mumps-Rubella), live 8/29/2001    Fluzone Quadrivalent (3+ years) 2/1/2019    Influenza, split virus (3+ years dose) 11/22/2002    Menactra (Meningococcal MCV4P) 9/13/2002        Allergies:     Last Reviewed on 5/10/2021 09:38 AM by Debra Bateman    No Known Allergies.        Current Medications:     Last Reviewed on 5/10/2021 09:38 AM by Debra Bateman    None        Objective:        Vitals:         Current: 5/10/2021 9:37:53 AM    Ht:  5 ft, 2.5 in;  Wt: 178.6 lbs;  BMI: 32.1T: 96.5 F (temporal);  BP: 111/65 mm Hg (left arm, sitting);  P: 89 bpm (left arm (BP Cuff), sitting);  sCr: 0.68 mg/dL;  GFR: 126.17        Exams:     PHYSICAL EXAM:     GENERAL: vital signs recorded - well developed, well nourished;  no apparent distress;     RESPIRATORY: normal respiratory rate and pattern with no distress; normal breath sounds with no rales, rhonchi, wheezes or rubs;     CARDIOVASCULAR: normal rate; rhythm is regular;  no systolic murmur;     BREAST/INTEGUMENT: skin intact to upper buttocks, but tender, no redness;     MUSCULOSKELETAL: No CVA tenderness     PSYCHIATRIC:  appropriate affect and demeanor; normal speech pattern; grossly normal memory;         Assessment:         L05.01   Pilonidal cyst with abscess       R35.0   Frequency of micturition           ORDERS:         Meds Prescribed:       [New Rx] ciprofloxacin HCl 500 mg oral tablet [take 1 tablet (500 mg) by oral route 2 times per day], #14 (fourteen) tablets, Refills: 0 (zero)         Lab Orders:       [id number]  [facility] Urinalysis, automated, with micro  (Send-Out)              Procedures Ordered:       REFER  Referral to Specialist or Other Facility  (Send-Out)                      Plan:         [facility], sent for ER records, reviewed flaget record pt had, with dg of pilonidal  cyst and the rx's they put her on/ will see which surgeon can get her on with this week    LABORATORY:  Labs ordered to be performed today include urinalysis with micro.      REFERRALS:  Referral initiated to a general surgeon ( for evaluation of pilonidal cyst ).            Orders:       [id number]  [facility] Urinalysis, automated, with micro  (Send-Out)            REFER  Referral to Specialist or Other Facility  (Send-Out)              Frequency of micturition(she was recently on augmentin, last month, took only 2 doses of bactrim, will cover her with cipro today )          Prescriptions:       [New Rx] ciprofloxacin HCl 500 mg oral tablet [take 1 tablet (500 mg) by oral route 2 times per day], #14 (fourteen) tablets, Refills: 0 (zero)             Charge Capture:         Primary Diagnosis:     L05.01  Pilonidal cyst with abscess           Orders:      [id number]  Office/outpatient visit; established patient, level 3  (In-House)              R35.0  Frequency of micturition

## 2024-08-14 ENCOUNTER — OFFICE VISIT (OUTPATIENT)
Dept: FAMILY MEDICINE CLINIC | Age: 34
End: 2024-08-14
Payer: COMMERCIAL

## 2024-08-14 VITALS
BODY MASS INDEX: 34.63 KG/M2 | TEMPERATURE: 97.9 F | OXYGEN SATURATION: 99 % | DIASTOLIC BLOOD PRESSURE: 63 MMHG | HEIGHT: 61 IN | WEIGHT: 183.4 LBS | SYSTOLIC BLOOD PRESSURE: 104 MMHG

## 2024-08-14 DIAGNOSIS — J34.89 RHINORRHEA: ICD-10-CM

## 2024-08-14 DIAGNOSIS — H66.003 NON-RECURRENT ACUTE SUPPURATIVE OTITIS MEDIA OF BOTH EARS WITHOUT SPONTANEOUS RUPTURE OF TYMPANIC MEMBRANES: Primary | ICD-10-CM

## 2024-08-14 PROCEDURE — 1160F RVW MEDS BY RX/DR IN RCRD: CPT | Performed by: NURSE PRACTITIONER

## 2024-08-14 PROCEDURE — 99213 OFFICE O/P EST LOW 20 MIN: CPT | Performed by: NURSE PRACTITIONER

## 2024-08-14 PROCEDURE — 87428 SARSCOV & INF VIR A&B AG IA: CPT | Performed by: NURSE PRACTITIONER

## 2024-08-14 PROCEDURE — 1126F AMNT PAIN NOTED NONE PRSNT: CPT | Performed by: NURSE PRACTITIONER

## 2024-08-14 PROCEDURE — 1159F MED LIST DOCD IN RCRD: CPT | Performed by: NURSE PRACTITIONER

## 2024-08-14 RX ORDER — GLUCOSAMINE HCL/CHONDROITIN SU 500-400 MG
1 CAPSULE ORAL
COMMUNITY
Start: 2024-03-27

## 2024-08-14 RX ORDER — AMOXICILLIN AND CLAVULANATE POTASSIUM 875; 125 MG/1; MG/1
1 TABLET, FILM COATED ORAL 2 TIMES DAILY
Qty: 20 TABLET | Refills: 0 | Status: SHIPPED | OUTPATIENT
Start: 2024-08-14 | End: 2024-08-24

## 2024-08-14 NOTE — PROGRESS NOTES
"Chief Complaint  Nausea (Pt c/o nausea, runny nose, eye swelling, headache, sneezing./Onset for a couple of days.//Pt declines C/F testing.)    Subjective          Hanna Matos presents to 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  URI   This is a new problem. The current episode started in the past 7 days. The problem has been gradually worsening. There has been no fever. Associated symptoms include congestion, headaches, nausea (\"a little bit\"), a plugged ear sensation (occasional), rhinorrhea, sinus pain and sneezing. Pertinent negatives include no coughing, diarrhea, ear pain, sore throat, swollen glands, vomiting or wheezing. Treatments tried: Excedrin Migraine, Sinus nasal spray. The treatment provided no relief.       Objective   Vital Signs:   /63 (BP Location: Right arm, Patient Position: Sitting)   Temp 97.9 °F (36.6 °C) (Oral)   Ht 154.9 cm (61\")   Wt 83.2 kg (183 lb 6.4 oz)   SpO2 99% Comment: room air  BMI 34.65 kg/m²     Physical Exam  Constitutional:       Appearance: Normal appearance.   HENT:      Head: Normocephalic.      Right Ear: Ear canal and external ear normal. Tympanic membrane is erythematous.      Left Ear: Ear canal and external ear normal. Tympanic membrane is erythematous.      Nose: Nose normal.      Right Sinus: No maxillary sinus tenderness or frontal sinus tenderness.      Left Sinus: No maxillary sinus tenderness or frontal sinus tenderness.      Mouth/Throat:      Mouth: Mucous membranes are moist.      Pharynx: Oropharynx is clear. No oropharyngeal exudate or posterior oropharyngeal erythema.   Eyes:      Conjunctiva/sclera: Conjunctivae normal.      Pupils: Pupils are equal, round, and reactive to light.   Cardiovascular:      Rate and Rhythm: Normal rate and regular rhythm.      Pulses: Normal pulses.      Heart sounds: Normal heart sounds.   Pulmonary:      Effort: Pulmonary effort is " normal. No respiratory distress.      Breath sounds: Normal breath sounds. No wheezing, rhonchi or rales.   Musculoskeletal:      Cervical back: Normal range of motion.   Lymphadenopathy:      Cervical: No cervical adenopathy.   Neurological:      Mental Status: She is alert and oriented to person, place, and time.   Psychiatric:         Mood and Affect: Mood normal.         Behavior: Behavior normal.         Thought Content: Thought content normal.        Result Review :   The following data was reviewed by: TRINI Xiong on 08/14/2024:                  Assessment and Plan    Diagnoses and all orders for this visit:    1. Non-recurrent acute suppurative otitis media of both ears without spontaneous rupture of tympanic membranes (Primary)  -     amoxicillin-clavulanate (AUGMENTIN) 875-125 MG per tablet; Take 1 tablet by mouth 2 (Two) Times a Day for 10 days.  Dispense: 20 tablet; Refill: 0    2. Rhinorrhea  -     POCT SARS-CoV-2 Antigen APOLINAR + Flu    She was negative for flu and COVID in office today. She most likely has a virus causing her symptoms, but her I believe she may have a bacterial infection with her ears, so I am sending in Augmentin for her.      Follow Up   Return if symptoms worsen or fail to improve.  Patient was given instructions and counseling regarding her condition or for health maintenance advice. Please see specific information pulled into the AVS if appropriate.

## 2024-10-10 ENCOUNTER — TELEPHONE (OUTPATIENT)
Dept: FAMILY MEDICINE CLINIC | Age: 34
End: 2024-10-10
Payer: COMMERCIAL

## 2024-10-10 NOTE — TELEPHONE ENCOUNTER
Caller: Hanna Matos    Relationship: Self    Best call back number: [phone]    Requested Prescriptions:   ALBUTEROL INHALER (NOT IN LIST)       Pharmacy where request should be sent: [facility] [address] [phone] [facility] [phone] FX     Last office visit with prescribing clinician: 8/9/2023   Last telemedicine visit with prescribing clinician: Visit date not found   Next office visit with prescribing clinician: Visit date not found     Additional details provided by patient: PATIENT SAID SHE HAS TAKEN THIS IN THE PAST AS A DAILY INHALER AND IS JUST GETTING OVER COVID. MEDICATION IS NOT IN LIST    Does the patient have less than a 3 day supply:  [] Yes  [] No    Would you like a call back once the refill request has been completed: [] Yes [] No    If the office needs to give you a call back, can they leave a voicemail: [] Yes [] No    Alexa Tan Rep   10/10/24 15:16 EDT

## 2024-12-17 ENCOUNTER — OFFICE VISIT (OUTPATIENT)
Dept: FAMILY MEDICINE CLINIC | Age: 34
End: 2024-12-17
Payer: COMMERCIAL

## 2024-12-17 VITALS
WEIGHT: 188 LBS | OXYGEN SATURATION: 100 % | HEART RATE: 83 BPM | SYSTOLIC BLOOD PRESSURE: 97 MMHG | TEMPERATURE: 98.4 F | BODY MASS INDEX: 35.5 KG/M2 | DIASTOLIC BLOOD PRESSURE: 61 MMHG | HEIGHT: 61 IN

## 2024-12-17 DIAGNOSIS — M76.62 TENDONITIS, ACHILLES, LEFT: Primary | ICD-10-CM

## 2024-12-17 PROCEDURE — 99213 OFFICE O/P EST LOW 20 MIN: CPT | Performed by: NURSE PRACTITIONER

## 2024-12-17 PROCEDURE — 1126F AMNT PAIN NOTED NONE PRSNT: CPT | Performed by: NURSE PRACTITIONER

## 2024-12-17 RX ORDER — DICLOFENAC SODIUM 75 MG/1
75 TABLET, DELAYED RELEASE ORAL 2 TIMES DAILY PRN
Qty: 60 TABLET | Refills: 0 | Status: SHIPPED | OUTPATIENT
Start: 2024-12-17

## 2025-01-24 ENCOUNTER — OFFICE VISIT (OUTPATIENT)
Dept: FAMILY MEDICINE CLINIC | Age: 35
End: 2025-01-24
Payer: COMMERCIAL

## 2025-01-24 ENCOUNTER — HOSPITAL ENCOUNTER (OUTPATIENT)
Dept: GENERAL RADIOLOGY | Facility: HOSPITAL | Age: 35
Discharge: HOME | End: 2025-01-24
Payer: COMMERCIAL

## 2025-01-24 VITALS
WEIGHT: 179 LBS | BODY MASS INDEX: 33.79 KG/M2 | SYSTOLIC BLOOD PRESSURE: 102 MMHG | HEART RATE: 70 BPM | DIASTOLIC BLOOD PRESSURE: 69 MMHG | HEIGHT: 61 IN | TEMPERATURE: 98.3 F | OXYGEN SATURATION: 98 %

## 2025-01-24 DIAGNOSIS — M79.672 LEFT FOOT PAIN: ICD-10-CM

## 2025-01-24 DIAGNOSIS — G43.811 OTHER MIGRAINE WITH STATUS MIGRAINOSUS, INTRACTABLE: Primary | ICD-10-CM

## 2025-01-24 PROCEDURE — 73630 X-RAY EXAM OF FOOT: CPT

## 2025-01-24 PROCEDURE — 1160F RVW MEDS BY RX/DR IN RCRD: CPT | Performed by: NURSE PRACTITIONER

## 2025-01-24 PROCEDURE — 99214 OFFICE O/P EST MOD 30 MIN: CPT | Performed by: NURSE PRACTITIONER

## 2025-01-24 PROCEDURE — 1159F MED LIST DOCD IN RCRD: CPT | Performed by: NURSE PRACTITIONER

## 2025-01-24 PROCEDURE — 1126F AMNT PAIN NOTED NONE PRSNT: CPT | Performed by: NURSE PRACTITIONER

## 2025-01-24 RX ORDER — SUMATRIPTAN SUCCINATE 25 MG/1
25 TABLET ORAL
Qty: 9 TABLET | Refills: 0 | Status: SHIPPED | OUTPATIENT
Start: 2025-01-24

## 2025-01-24 RX ORDER — METHYLPREDNISOLONE 4 MG/1
TABLET ORAL
Qty: 21 EACH | Refills: 0 | Status: SHIPPED | OUTPATIENT
Start: 2025-01-24

## 2025-01-24 NOTE — PROGRESS NOTES
"Chief Complaint  Migraine (Get them on and off. X1-2 weeks on going, will not go away. Tylenol and ibuprofen is not helping. ) and Foot Injury (Was told to wear boot, x2 weeks. Not any better.)    Subjective          Hanna Matos presents to [facility]  History of Present Illness  She reports having a headache that's been present for about 2 weeks. The pain is bandlike in nature and she reports pain behind bilateral eyes. She reports that it is waxing and waning. She reports photophobia and nausea. She has not vomited. She has tried ibuprofen, eye patch, soaking her feet, hot shower/bath with lavender and Epsolm salt, and rest. She reports she would have migraines when she was younger.       She was seen in December for left foot pain.  She was given a prescription of diclofenac. She didn't take it for long, as it made her feel weird with her stomach. She wore a boot for 2 weeks, but the pain is no better. The pain is worse with standing or walking around.       Objective   Vital Signs:   /69 (BP Location: Left arm, Patient Position: Sitting, Cuff Size: Large Adult)   Pulse 70   Temp 98.3 °F (36.8 °C) (Oral)   Ht 154.9 cm (60.98\")   Wt 81.2 kg (179 lb)   SpO2 98%   BMI 33.84 kg/m²     Physical Exam  Constitutional:       General: She is not in acute distress.     Appearance: Normal appearance.   HENT:      Head: Normocephalic.   Eyes:      Pupils: Pupils are equal, round, and reactive to light.      Visual Fields: Right eye visual fields normal and left eye visual fields normal.   Neck:      Trachea: Trachea normal.   Cardiovascular:      Rate and Rhythm: Normal rate and regular rhythm.      Heart sounds: Normal heart sounds.   Pulmonary:      Effort: Pulmonary effort is normal.      Breath sounds: Normal breath sounds and air entry.   Musculoskeletal:      Right lower leg: No edema.      Left lower leg: No edema.   Skin:     General: Skin is warm and dry. "   Neurological:      Mental Status: She is alert and oriented to person, place, and time.   Psychiatric:         Mood and Affect: Mood and affect normal.         Behavior: Behavior normal.         Thought Content: Thought content normal.        Result Review :   The following data was reviewed by: TRINI Xiong on 01/24/2025:                  Assessment and Plan    Diagnoses and all orders for this visit:    1. Other migraine with status migrainosus, intractable (Primary)  Comments:  Declines Toradol injection in office. Will send in Imitrex.  Orders:  -     SUMAtriptan (Imitrex) 25 MG tablet; Take one tablet by mouth at onset of headache. May repeat dose one time in 2 hours if headache not relieved.  Dispense: 9 tablet; Refill: 0    2. Left foot pain  Comments:  Unable to tolerate diclofenac. Will try Medrol, obtain Xray and refer to podiatry.  Orders:  -     Ambulatory Referral to Podiatry  -     XR Foot 3+ View Left; Future  -     methylPREDNISolone (MEDROL) 4 MG dose pack; Take by mouth as directed by package instructions.  Dispense: 21 each; Refill: 0        BMI is >= 30 and <35. (Class 1 Obesity). The following options were offered after discussion;: referral to primary care       Follow Up   Return if symptoms worsen or fail to improve.  Patient was given instructions and counseling regarding her condition or for health maintenance advice. Please see specific information pulled into the AVS if appropriate.

## 2025-02-06 ENCOUNTER — OFFICE VISIT (OUTPATIENT)
Dept: FAMILY MEDICINE CLINIC | Age: 35
End: 2025-02-06
Payer: COMMERCIAL

## 2025-02-06 ENCOUNTER — OFFICE VISIT (OUTPATIENT)
Dept: PODIATRY | Facility: CLINIC | Age: 35
End: 2025-02-06
Payer: COMMERCIAL

## 2025-02-06 VITALS
HEIGHT: 61 IN | WEIGHT: 188 LBS | SYSTOLIC BLOOD PRESSURE: 118 MMHG | DIASTOLIC BLOOD PRESSURE: 71 MMHG | OXYGEN SATURATION: 98 % | HEART RATE: 74 BPM | TEMPERATURE: 98.3 F | BODY MASS INDEX: 35.5 KG/M2

## 2025-02-06 VITALS
BODY MASS INDEX: 35.5 KG/M2 | OXYGEN SATURATION: 99 % | DIASTOLIC BLOOD PRESSURE: 50 MMHG | SYSTOLIC BLOOD PRESSURE: 106 MMHG | WEIGHT: 188 LBS | HEIGHT: 61 IN | HEART RATE: 85 BPM

## 2025-02-06 DIAGNOSIS — G43.719 INTRACTABLE CHRONIC MIGRAINE WITHOUT AURA AND WITHOUT STATUS MIGRAINOSUS: Primary | ICD-10-CM

## 2025-02-06 DIAGNOSIS — Z87.09 HISTORY OF ASTHMA: ICD-10-CM

## 2025-02-06 DIAGNOSIS — Z72.0 VAPES NICOTINE CONTAINING SUBSTANCE: ICD-10-CM

## 2025-02-06 DIAGNOSIS — M79.672 FOOT PAIN, LEFT: ICD-10-CM

## 2025-02-06 DIAGNOSIS — M77.52 CALCANEAL BURSITIS (HEEL), LEFT: Primary | ICD-10-CM

## 2025-02-06 DIAGNOSIS — R06.2 WHEEZING: ICD-10-CM

## 2025-02-06 PROCEDURE — 1126F AMNT PAIN NOTED NONE PRSNT: CPT | Performed by: NURSE PRACTITIONER

## 2025-02-06 PROCEDURE — 99214 OFFICE O/P EST MOD 30 MIN: CPT | Performed by: NURSE PRACTITIONER

## 2025-02-06 RX ORDER — ALBUTEROL SULFATE 90 UG/1
2 INHALANT RESPIRATORY (INHALATION) EVERY 4 HOURS PRN
Qty: 18 G | Refills: 0 | Status: SHIPPED | OUTPATIENT
Start: 2025-02-06

## 2025-02-06 RX ORDER — NAPROXEN 500 MG/1
500 TABLET ORAL 2 TIMES DAILY WITH MEALS
Qty: 60 TABLET | Refills: 1 | Status: SHIPPED | OUTPATIENT
Start: 2025-02-06 | End: 2025-03-08

## 2025-02-06 NOTE — PROGRESS NOTES
[facility]    Today's Date: 02/06/25    Patient Name: Hanna Matos  MRN: [id number]  CSN: [id number]  PCP: Jimena Olson APRN  Referring Provider: Puja Conde *    SUBJECTIVE     Chief Complaint   Patient presents with    Left Foot - Establish Care, Pain     Xray on chart, 1/24/25  Pain in back of heel and up x 2 months   Only different activity was going up and down a ladder painting   Saw PCP office twice, diclofenac was prescribed and did not help  Pt did not take Medrol pack that was prescribed the 2nd time at PCP office  Pt wore cam walker x 2 weeks, no improvement      HPI: Hanna Matos, a 34 y.o.female, presents to clinic.    New, Established, New Problem: New    Onset: Acute    Nature: Sore, sharp    Aggravating factors:  Patient relates pain is aggravated by shoe gear and ambulation.      Patient denies any fevers, chills, nausea, vomiting, shortness of breath, nor any other constitutional signs nor symptoms.    No other pedal complaints at this time.    Past Medical History:   Diagnosis Date    Acute maxillary sinusitis, unspecified     Acute vaginitis     ADHD (attention deficit hyperactivity disorder) ?    Amenorrhea, unspecified     Anemia     Asthma     NO INHALERS    Carpal tunnel syndrome     RIGHT/SURGERY 02/2021    Cervical cancer     Cervicalgia     Endometriosis     ABN CELLS, CYSTS    Foot pain, left     Frequency of micturition     Hypothyroidism 2023    Localized swelling, mass and lump, head     Low back pain     Migraine with aura and without status migrainosus, not intractable     Noninflammatory disorder of vagina, unspecified     Obesity 2024    Other mixed anxiety disorders     Pain in right shoulder     Pain in thoracic spine     Pilonidal cyst with abscess     Pneumonia     PONV (postoperative nausea and vomiting)     Recurrent UTI     diagnosed at age 4     Past Surgical History:   Procedure Laterality Date    CARPAL TUNNEL  RELEASE Right 1/15/2021    Procedure: Right Carpal Tunnel Release;  Surgeon: Osvaldo Strong MD;  Location:  [facility] [place];  Service: Orthopedics;  Laterality: Right;    CERVICAL BIOPSY  W/ LOOP ELECTRODE EXCISION      PILONIDAL CYST DRAINAGE      TUBAL ABDOMINAL LIGATION      VAGINAL HYSTERECTOMY N/A 2021    Procedure: TOTAL VAGINAL HYSTERECTOMY;  Surgeon: Que Bo MD;  Location: [facility] MAIN OR;  Service: Gynecology;  Laterality: N/A;     Family History   Problem Relation Age of Onset    Heart disease Other     Diabetes Other     Lung cancer Other     Lymphoma Other     Breast cancer Other     Hypertension Other     ADD / ADHD Other     Lymphoma Father          at age 52    Cancer Father     Lung cancer Maternal Grandfather     Heart failure Paternal Grandfather     Diabetes type II Paternal Grandfather     Anxiety disorder Mother     Arthritis Mother     Asthma Mother     COPD Mother     Depression Mother     Diabetes Mother     Hyperlipidemia Mother     Thyroid disease Mother     Malig Hyperthermia Neg Hx      Social History     Socioeconomic History    Marital status: Single    Number of children: 2   Tobacco Use    Smoking status: Former     Current packs/day: 0.00     Average packs/day: 0.5 packs/day for 22.0 years (11.0 ttl pk-yrs)     Types: Cigarettes     Quit date:      Years since quittin.1     Passive exposure: Current    Smokeless tobacco: Never   Vaping Use    Vaping status: Every Day    Substances: Nicotine, Flavoring    Devices: Disposable   Substance and Sexual Activity    Alcohol use: Never    Drug use: Never    Sexual activity: Yes     Partners: Male     Birth control/protection: None, Hysterectomy     No Known Allergies  Current Outpatient Medications   Medication Sig Dispense Refill    albuterol sulfate  (90 Base) MCG/ACT inhaler Inhale 2 puffs Every 4 (Four) Hours As Needed for Wheezing. 18 g 0    amitriptyline (ELAVIL) 25 MG tablet Take 1 tablet by mouth Every  Night. 30 tablet 1    aspirin-acetaminophen-caffeine (EXCEDRIN MIGRAINE) 250-250-65 MG per tablet Take 1 tablet by mouth Every 6 (Six) Hours As Needed for Headache.      levothyroxine (SYNTHROID, LEVOTHROID) 25 MCG tablet TAKE 1 TABLET BY MOUTH EVERY MORNING 90 tablet 1    SUMAtriptan (Imitrex) 25 MG tablet Take one tablet by mouth at onset of headache. May repeat dose one time in 2 hours if headache not relieved. 9 tablet 0    naproxen (Naprosyn) 500 MG tablet Take 1 tablet by mouth 2 (Two) Times a Day With Meals for 30 days. 60 tablet 1     No current facility-administered medications for this visit.     Review of Systems   Constitutional: Negative.    Musculoskeletal:         Posterior aspect of left calcaneus   All other systems reviewed and are negative.      OBJECTIVE     Vitals:    02/06/25 1402   BP: 106/50   Pulse: 85   SpO2: 99%       WBC   Date Value Ref Range Status   08/09/2023 9.03 3.40 - 10.80 10*3/mm3 Final     RBC   Date Value Ref Range Status   08/09/2023 4.19 3.77 - 5.28 10*6/mm3 Final     Hemoglobin   Date Value Ref Range Status   08/09/2023 13.0 12.0 - 15.9 g/dL Final     Hematocrit   Date Value Ref Range Status   08/09/2023 39.7 34.0 - 46.6 % Final     MCV   Date Value Ref Range Status   08/09/2023 94.7 79.0 - 97.0 fL Final     MCH   Date Value Ref Range Status   08/09/2023 31.0 26.6 - 33.0 pg Final     MCHC   Date Value Ref Range Status   08/09/2023 32.7 31.5 - 35.7 g/dL Final     RDW   Date Value Ref Range Status   08/09/2023 12.1 (L) 12.3 - 15.4 % Final     RDW-SD   Date Value Ref Range Status   08/09/2023 42.2 37.0 - 54.0 fl Final     MPV   Date Value Ref Range Status   08/09/2023 9.6 6.0 - 12.0 fL Final     Platelets   Date Value Ref Range Status   08/09/2023 245 140 - 450 10*3/mm3 Final     Neutrophil %   Date Value Ref Range Status   08/09/2023 62.3 42.7 - 76.0 % Final     Lymphocyte %   Date Value Ref Range Status   08/09/2023 27.9 19.6 - 45.3 % Final     Monocyte %   Date Value Ref  Range Status   08/09/2023 6.9 5.0 - 12.0 % Final     Eosinophil %   Date Value Ref Range Status   08/09/2023 2.3 0.3 - 6.2 % Final     Basophil %   Date Value Ref Range Status   08/09/2023 0.4 0.0 - 1.5 % Final     Immature Grans %   Date Value Ref Range Status   08/09/2023 0.2 0.0 - 0.5 % Final     Neutrophils, Absolute   Date Value Ref Range Status   08/09/2023 5.62 1.70 - 7.00 10*3/mm3 Final     Lymphocytes, Absolute   Date Value Ref Range Status   08/09/2023 2.52 0.70 - 3.10 10*3/mm3 Final     Monocytes, Absolute   Date Value Ref Range Status   08/09/2023 0.62 0.10 - 0.90 10*3/mm3 Final     Eosinophils, Absolute   Date Value Ref Range Status   08/09/2023 0.21 0.00 - 0.40 10*3/mm3 Final     Basophils, Absolute   Date Value Ref Range Status   08/09/2023 0.04 0.00 - 0.20 10*3/mm3 Final     Immature Grans, Absolute   Date Value Ref Range Status   08/09/2023 0.02 0.00 - 0.05 10*3/mm3 Final     nRBC   Date Value Ref Range Status   06/28/2022 0.0 0.0 - 0.2 /100 WBC Final         Lab Results   Component Value Date    GLUCOSE 95 08/09/2023    BUN 15 08/09/2023    CREATININE 0.77 08/09/2023     08/09/2023    K 4.2 08/09/2023     08/09/2023    CALCIUM 9.6 08/09/2023    PROTEINTOT 6.9 08/09/2023    ALBUMIN 4.1 08/09/2023    ALT 7 08/09/2023    AST 10 08/09/2023    ALKPHOS 66 08/09/2023    BILITOT 0.3 08/09/2023    GLOB 2.8 08/09/2023    AGRATIO 1.5 08/09/2023    BCR 19.5 08/09/2023    ANIONGAP 10.2 08/09/2023    EGFR 105.3 08/09/2023       Patient seen in no apparent distress.      PHYSICAL EXAM:     Foot/Ankle Exam    GENERAL  Appearance:  appears stated age  Orientation:  AAOx3  Affect:  appropriate  Gait:  unimpaired  Assistance:  independent  Right shoe gear: casual shoe  Left shoe gear: casual shoe    VASCULAR     Right Foot Vascularity   Normal vascular exam    Dorsalis pedis:  2+  Posterior tibial:  2+  Skin temperature:  warm  Edema grading:  None  CFT:  < 3 seconds  Pedal hair growth:   Present  Varicosities:  none     Left Foot Vascularity   Normal vascular exam    Dorsalis pedis:  2+  Posterior tibial:  2+  Skin temperature:  warm  Edema grading:  None  CFT:  < 3 seconds  Pedal hair growth:  Present  Varicosities:  none     NEUROLOGIC     Right Foot Neurologic   Normal sensation    Light touch sensation: normal  Vibratory sensation: normal  Hot/Cold sensation: normal  Protective Sensation using Bend-Tania Monofilament:   Sites intact: 10  Sites tested: 10     Left Foot Neurologic   Normal sensation    Light touch sensation: normal  Vibratory sensation: normal  Hot/Cold sensation:  normal  Protective Sensation using Bend-Tania Monofilament:   Sites intact: 10  Sites tested: 10    MUSCULOSKELETAL     Left Foot Musculoskeletal   Ecchymosis:  none  Tenderness:  (Retrocalcaneal bursal area.)    MUSCLE STRENGTH     Right Foot Muscle Strength   Foot dorsiflexion:  4  Foot plantar flexion:  4  Foot inversion:  4  Foot eversion:  4     Left Foot Muscle Strength   Foot dorsiflexion:  4  Foot plantar flexion:  4  Foot inversion:  4  Foot eversion:  4    RANGE OF MOTION     Right Foot Range of Motion   Foot and ankle ROM within normal limits       Left Foot Range of Motion   Foot and ankle ROM within normal limits      DERMATOLOGIC      Right Foot Dermatologic   Skin  Right foot skin is intact.      Left Foot Dermatologic   Skin  Left foot skin is intact.       RADIOLOGY:      XR Foot 3+ View Left    Result Date: 1/27/2025  Narrative: XR FOOT 3+ VW LEFT-  Date of exam: 1/24/2025, 1:20 P.M.  Indications: left foot pain; m79.672-pain in left foot; h/o medial heel pain (for 1 month)  Comparison: None available.  TECHNIQUE: Three (3) nonweightbearing views of the left foot were obtained.  FINDINGS: BONES: No significant arthropathy or acute abnormality. Minimal enthesopathy involves the retrocalcaneal region. SOFT TISSUES: No visible soft tissue swelling. No subcutaneous emphysema. No retained  radiopaque foreign body. EFFUSION: No effusion is appreciated. OTHER: Negative. If symptoms or clinical concerns persist, consider imaging follow-up.       Impression: No acute fracture or acute malalignment. Please see above comments for further detail.    Portions of this note were completed with a voice recognition program.  Electronically Signed By-Tai Galindo MD On:1/27/2025 2:16 AM       ASSESSMENT/PLAN     Diagnoses and all orders for this visit:    1. Calcaneal bursitis (heel), left (Primary)  -     naproxen (Naprosyn) 500 MG tablet; Take 1 tablet by mouth 2 (Two) Times a Day With Meals for 30 days.  Dispense: 60 tablet; Refill: 1    2. Foot pain, left    Comprehensive lower extremity examination and evaluation was performed.    Discussed findings and treatment plan including risks, benefits, and treatment options with patient in detail. Patient agreed with treatment plan.    Medications and allergies reviewed.  Reviewed available lab values along with other pertinent labs.  These were discussed with the patient.    Recommend use of OTC Tuli heel cups.    Discussed proper shoegear for the patient's feet and medical condition.  Patient states understanding and agreement with this plan.    Rice Therapy: It is important to treat any injury as soon as possible to help control swelling and increase recovery time. The recognized regimen for immediate treatment of sport injuries includes rest, ice (cold application), compression, and elevation (RICE). Remove the injured athlete from play, apply ice to the affected area, wrap or compress the injured area with an elastic bandage when appropriate, and elevate the injured area above heart level to reduce swelling.  The patient is to not use ice for longer than 20 minutes at a time, with at least 20 minutes of no ice usage between applications.     Patient instructed use OTC analgesics with dosing per package insert as needed.      The patient states understanding  and agreement with this plan.    Patient is to monitor for recurrence and any new symptoms and to contact Dr. Saab's office for a follow-up appointment.      The patient states understanding and agreement with this plan.      An After Visit Summary was printed and given to the patient at discharge, including (if requested) any available informative/educational handouts regarding diagnosis, treatment, or medications. All questions were answered to patient/family satisfaction. Should symptoms fail to improve or worsen they agree to call or return to clinic or to go to the Emergency Department. Discussed the importance of following up with any needed screening tests/labs/specialist appointments and any requested follow-up recommended by me today. Importance of maintaining follow-up discussed and patient accepts that missed appointments can delay diagnosis and potentially lead to worsening of conditions.    Return if symptoms worsen or fail to improve., or sooner if acute issues arise.    This document has been electronically signed by Jacobo Saab DPM on February 6, 2025 14:32 EST

## 2025-02-06 NOTE — PROGRESS NOTES
"Hanna Matos presents to [facility] with complaint of  Headache (Follow up )    SUBJECTIVE  History of Present Illness    Patient is here to discuss ongoing headaches.  She was seen by Renea FELIZ in the office a couple of weeks ago for these headaches.  She was prescribed sumatriptan which does decrease her pain somewhat however she experiences worsening nausea and dizziness whenever she takes this medication.  Patient says that the head primarily occurs on the right side of her head.  She does endorse photophobia nausea during her headaches.  Patient says that she is able to sleep her headaches off most of the time.  She says that Excedrin does make her headaches go away.  She is having to take this every day.  She also had eye care appointment to ensure vision deficit was not causing headaches.  Patient reports optometrist does not suspect vision to be cause.    Patient also says that she is no longer smoking cigarettes but is now vaping.  She has history of asthma and used to have an albuterol inhaler on hand.  She does not currently and is asking for this today.  No other respiratory symptoms reported.    OBJECTIVE  Vital Signs:   /71 (BP Location: Left arm, Patient Position: Sitting, Cuff Size: Adult)   Pulse 74   Temp 98.3 °F (36.8 °C) (Oral)   Ht 154.9 cm (60.98\")   Wt 85.3 kg (188 lb)   SpO2 98%   BMI 35.55 kg/m²       Physical Exam  Vitals reviewed.   Constitutional:       General: She is not in acute distress.     Appearance: Normal appearance. She is not ill-appearing.   HENT:      Head: Normocephalic and atraumatic.      Nose: Nose normal.      Mouth/Throat:      Mouth: Mucous membranes are moist.      Pharynx: Oropharynx is clear.   Cardiovascular:      Rate and Rhythm: Normal rate and regular rhythm.      Pulses: Normal pulses.      Heart sounds: Normal heart sounds.   Pulmonary:      Effort: Pulmonary effort is normal.      Breath sounds: Normal breath " sounds.   Musculoskeletal:      Cervical back: Neck supple.   Skin:     General: Skin is warm and dry.   Neurological:      General: No focal deficit present.      Mental Status: She is alert and oriented to person, place, and time. Mental status is at baseline.   Psychiatric:         Mood and Affect: Mood normal.         Behavior: Behavior normal.         Judgment: Judgment normal.          Results Review:  The following data was reviewed by TRINI Bird [unfilled] 11:39 EST.  Office Visit with Puja Conde APRN (01/24/2025)     ASSESSMENT AND PLAN:  Diagnoses and all orders for this visit:    1. Intractable chronic migraine without aura and without status migrainosus (Primary)  -     amitriptyline (ELAVIL) 25 MG tablet; Take 1 tablet by mouth Every Night.  Dispense: 30 tablet; Refill: 1    2. Wheezing  -     albuterol sulfate  (90 Base) MCG/ACT inhaler; Inhale 2 puffs Every 4 (Four) Hours As Needed for Wheezing.  Dispense: 18 g; Refill: 0    3. History of asthma  -     albuterol sulfate  (90 Base) MCG/ACT inhaler; Inhale 2 puffs Every 4 (Four) Hours As Needed for Wheezing.  Dispense: 18 g; Refill: 0    4. Vapes nicotine containing substance     Since she is having a headache every day, she is a candidate for preventative therapy.  She will start Elavil 25 mg nightly.  She was educated on medication side effects especially drowsiness.  If she notices any change in her mental health, or suicidal thoughts develop, she should stop taking the medication.  She understands that this medication may take a couple of weeks to see benefit.  She may continue using Excedrin in the meantime however she was advised to try not using this every day as rebound headaches can occur.     She was given albuterol inhaler.  Encouraged to stop vaping.  She has not seen her PCP for almost 2 years.  For this reason, she was scheduled an office visit for headache follow-up with her PCP.  May need to consider  doing labs or imaging if headaches do not show improvement in the future.    Hanna Matos  reports that she quit smoking about 2 years ago. Her smoking use included cigarettes. She has a 11 pack-year smoking history. She has been exposed to tobacco smoke. She has never used smokeless tobacco.  She is currently vaping I have educated her on the risk of diseases from using tobacco and nicotine products such as cancer, COPD, heart disease, cataracts, and arterial disease.     I advised her to quit and she is not willing to quit.    I spent 3  minutes counseling the patient.         Follow Up   Return in about 3 weeks (around 2/27/2025). Patient to notify office with any acute concerns or issues.  Patient verbalizes understanding, agrees with plan of care and has no further questions upon discharge.     Patient was given instructions and counseling regarding her condition or for health maintenance advice. Please see specific information pulled into the AVS if appropriate.     Discussed the importance of following up with any needed screening tests/labs/specialist appointments and any requested follow-up recommended by me today. Importance of maintaining follow-up discussed and patient accepts that missed appointments can delay diagnosis and potentially lead to worsening of conditions.    Part of this note may be an electronic transcription/translation of spoken language to printed text using the Dragon Dictation System.

## 2025-02-06 NOTE — LETTER
February 6, 2025     TRINI Fontanez  [address] [facility]  [address]  [facility] [address]    Patient: Hanna Matos   YOB: 1990   Date of Visit: 2/6/2025       Dear TRINI Fontanez:    Thank you for referring Hanna Matos to me for evaluation. Below are the relevant portions of my assessment and plan of care.    Encounter Diagnosis and Orders:  Diagnoses and all orders for this visit:    1. Calcaneal bursitis (heel), left (Primary)  -     naproxen (Naprosyn) 500 MG tablet; Take 1 tablet by mouth 2 (Two) Times a Day With Meals for 30 days.  Dispense: 60 tablet; Refill: 1    2. Foot pain, left        If you have questions, please do not hesitate to call me. I look forward to following Hanna along with you.         Sincerely,        Jacobo Saab DPM        CC: TRINI Abbott

## 2025-02-27 ENCOUNTER — OFFICE VISIT (OUTPATIENT)
Dept: FAMILY MEDICINE CLINIC | Age: 35
End: 2025-02-27
Payer: COMMERCIAL

## 2025-02-27 ENCOUNTER — LAB (OUTPATIENT)
Dept: LAB | Facility: HOSPITAL | Age: 35
End: 2025-02-27
Payer: COMMERCIAL

## 2025-02-27 VITALS
HEART RATE: 71 BPM | OXYGEN SATURATION: 98 % | DIASTOLIC BLOOD PRESSURE: 71 MMHG | TEMPERATURE: 98.4 F | SYSTOLIC BLOOD PRESSURE: 108 MMHG | BODY MASS INDEX: 35.45 KG/M2 | WEIGHT: 187.8 LBS | HEIGHT: 61 IN

## 2025-02-27 DIAGNOSIS — Z87.09 HISTORY OF ASTHMA: ICD-10-CM

## 2025-02-27 DIAGNOSIS — E03.9 ACQUIRED HYPOTHYROIDISM: Primary | ICD-10-CM

## 2025-02-27 DIAGNOSIS — Z87.898 HISTORY OF HEADACHE: ICD-10-CM

## 2025-02-27 DIAGNOSIS — M79.672 PAIN OF LEFT HEEL: ICD-10-CM

## 2025-02-27 PROBLEM — Z20.822 CLOSE EXPOSURE TO COVID-19 VIRUS: Status: RESOLVED | Noted: 2021-09-27 | Resolved: 2025-02-27

## 2025-02-27 PROBLEM — Z98.890 S/P CARPAL TUNNEL RELEASE: Status: RESOLVED | Noted: 2021-01-22 | Resolved: 2025-02-27

## 2025-02-27 PROBLEM — M25.511 CHRONIC RIGHT SHOULDER PAIN: Status: RESOLVED | Noted: 2022-03-01 | Resolved: 2025-02-27

## 2025-02-27 PROBLEM — G56.01 CARPAL TUNNEL SYNDROME OF RIGHT WRIST: Status: RESOLVED | Noted: 2020-11-25 | Resolved: 2025-02-27

## 2025-02-27 PROBLEM — G89.29 CHRONIC RIGHT SHOULDER PAIN: Status: RESOLVED | Noted: 2022-03-01 | Resolved: 2025-02-27

## 2025-02-27 PROBLEM — R42 DIZZINESS: Status: RESOLVED | Noted: 2023-08-09 | Resolved: 2025-02-27

## 2025-02-27 PROBLEM — R10.2 PELVIC PAIN: Status: RESOLVED | Noted: 2021-06-24 | Resolved: 2025-02-27

## 2025-02-27 PROBLEM — R21 RASH: Status: RESOLVED | Noted: 2022-01-27 | Resolved: 2025-02-27

## 2025-02-27 PROBLEM — R29.898 WEAKNESS OF SHOULDER: Status: RESOLVED | Noted: 2022-04-04 | Resolved: 2025-02-27

## 2025-02-27 LAB — TSH SERPL DL<=0.05 MIU/L-ACNC: 2.13 UIU/ML (ref 0.27–4.2)

## 2025-02-27 PROCEDURE — 99214 OFFICE O/P EST MOD 30 MIN: CPT | Performed by: NURSE PRACTITIONER

## 2025-02-27 PROCEDURE — 1125F AMNT PAIN NOTED PAIN PRSNT: CPT | Performed by: NURSE PRACTITIONER

## 2025-02-27 PROCEDURE — 36415 COLL VENOUS BLD VENIPUNCTURE: CPT | Performed by: NURSE PRACTITIONER

## 2025-02-27 PROCEDURE — 84443 ASSAY THYROID STIM HORMONE: CPT | Performed by: NURSE PRACTITIONER

## 2025-02-27 NOTE — ASSESSMENT & PLAN NOTE
Reviewed her chart and care everywhere.  Will go ahead and recheck her TSH today, discussed thyroid conditions, weight gain, she brought up obesity and GLP 1, she is not interested in taking that type of rx, will continue her efforts with diet and regular exercise   To continue to take rx on empty stomach with no other food or rx

## 2025-02-27 NOTE — PROGRESS NOTES
Chief Complaint  Headache (3 week follow up)    Subjective          Hanna Matos presents to [facility]    History of Present Illness  Headaches:  Started over a month ago, with daily am CONNELLY  First seen here and given imitrex, helped ease HA's, still has and can take if needed.   She was then seen again for follow up and started on elavil nightly but not taking and initially had tried excedrin migraine and takes prn.     Associated symptoms : did have some nausea, did have pain behind her right eye but since getting her new eye glasses she has not had a headache   Was seen by her optometrist and had an eye exam, now has new glasses      Hypothyroidism  Seeing aidan patel, not seen since 3-2024  Current rx: levo 25 mcg   Tolerating rx: yes takes daily in the am    Lab Results       Component                Value               Date                       TSH                      2.560               2023                                               1.77                 2024      PAST MEDICAL HISTORY changes since the last time I saw her on 2023:         Asthma: dx'd at age 18 mos;       Pneumonia: hospitalized;     Urinary Tract Infections, Recurrent: dx'd at age 4; hospitilized;  hosp-- for pylonephritis               GYNECOLOGICAL HISTORY:     miscarriage 1     Menarche occurred at age 15 yrs.    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    Amenorrhea, unspecified     Anemia     Asthma     NO INHALERS    Carpal tunnel syndrome     RIGHT/SURGERY 2021    Cervical cancer     Cervicalgia     Endometriosis     ABN CELLS, CYSTS    Foot pain, left     Frequency of micturition     Hypothyroidism     Localized swelling, mass and lump, head     Low back pain     Migraine with aura and without status migrainosus, not intractable     Noninflammatory disorder of vagina, unspecified     Obesity     Other mixed anxiety disorders     Pain in right shoulder     Pain in thoracic spine     Pilonidal cyst with abscess     Pneumonia     PONV (postoperative nausea and vomiting)     Recurrent UTI     diagnosed at age 4       No Known Allergies     Past Surgical History:   Procedure Laterality Date    CARPAL TUNNEL RELEASE Right 1/15/2021    Procedure: Right Carpal Tunnel Release;  Surgeon: Osvaldo Strong MD;  Location: [facility];  Service: Orthopedics;  Laterality: Right;    CERVICAL BIOPSY  W/ LOOP ELECTRODE EXCISION      PILONIDAL CYST DRAINAGE      TUBAL ABDOMINAL LIGATION      VAGINAL HYSTERECTOMY N/A 2021    Procedure: TOTAL VAGINAL HYSTERECTOMY;  Surgeon: Que Bo MD;  Location: [facility];  Service: Gynecology;  Laterality: N/A;        Social History     Tobacco Use    Smoking status: Former     Current packs/day: 0.00     Average packs/day: 0.5 packs/day for 22.0 years (11.0 ttl pk-yrs)     Types: Cigarettes     Quit date:      Years since quittin.1     Passive exposure: Current    Smokeless tobacco: Never   Substance Use Topics    Alcohol use: Never       Family History   Problem Relation Age of  Onset    Heart disease Other     Diabetes Other     Lung cancer Other     Lymphoma Other     Breast cancer Other     Hypertension Other     ADD / ADHD Other     Lymphoma Father          at age 52    Cancer Father     Lung cancer Maternal Grandfather     Heart failure Paternal Grandfather     Diabetes type II Paternal Grandfather     Anxiety disorder Mother     Arthritis Mother     Asthma Mother     COPD Mother     Depression Mother     Diabetes Mother     Hyperlipidemia Mother     Thyroid disease Mother     Malig Hyperthermia Neg Hx         Health Maintenance Due   Topic Date Due    Pneumococcal Vaccine 0-49 (1 of 2 - PCV) Never done    ANNUAL PHYSICAL  Never done    PAP SMEAR  Never done        Current Outpatient Medications on File Prior to Visit   Medication Sig    albuterol sulfate  (90 Base) MCG/ACT inhaler Inhale 2 puffs Every 4 (Four) Hours As Needed for Wheezing.    levothyroxine (SYNTHROID, LEVOTHROID) 25 MCG tablet TAKE 1 TABLET BY MOUTH EVERY MORNING    naproxen (Naprosyn) 500 MG tablet Take 1 tablet by mouth 2 (Two) Times a Day With Meals for 30 days.    SUMAtriptan (Imitrex) 25 MG tablet Take one tablet by mouth at onset of headache. May repeat dose one time in 2 hours if headache not relieved.    [DISCONTINUED] amitriptyline (ELAVIL) 25 MG tablet Take 1 tablet by mouth Every Night. (Patient not taking: Reported on 2025)    [DISCONTINUED] aspirin-acetaminophen-caffeine (EXCEDRIN MIGRAINE) 250-250-65 MG per tablet Take 1 tablet by mouth Every 6 (Six) Hours As Needed for Headache. (Patient not taking: Reported on 2025)     No current facility-administered medications on file prior to visit.       Immunization History   Administered Date(s) Administered    DTaP 01/10/1991, 1991, 1991, 1992, 1995    Flu Vaccine Quad PF >36MO 2002, 2019    Fluzone (or Fluarix & Flulaval for VFC) >6mos 2002, 2019    HPV Quadrivalent 2008, 2008,  "09/05/2008    Hepatitis B Adult/Adolescent IM 08/29/2001, 05/17/2002    HiB 05/09/1991, 07/11/1991, 09/11/1991, 03/25/1992    Hib (PRP-T) 05/09/1991, 07/11/1991, 09/11/1991, 03/25/1992    IPV 01/10/1991, 05/09/1991, 03/25/1992, 05/01/1995    MMR 05/25/1992, 08/29/2001    Meningococcal Conjugate 09/13/2002    Td (TDVAX) 08/06/2002    Tdap 08/25/2015       Review of Systems   Constitutional:  Positive for unexpected weight gain. Negative for fatigue and fever.   Respiratory:  Negative for cough and shortness of breath.    Cardiovascular:  Negative for chest pain, palpitations and leg swelling.   Musculoskeletal:  Positive for arthralgias (left heel pain, getting a insert, saw podiatrist).        Objective     Vitals:    02/27/25 0953   BP: 108/71   BP Location: Left arm   Patient Position: Sitting   Cuff Size: Large Adult   Pulse: 71   Temp: 98.4 °F (36.9 °C)   TempSrc: Oral   SpO2: 98%   Weight: 85.2 kg (187 lb 12.8 oz)   Height: 154.9 cm (61\")            Physical Exam  Vitals reviewed.   Constitutional:       General: She is not in acute distress.     Appearance: Normal appearance.   Cardiovascular:      Rate and Rhythm: Normal rate and regular rhythm.      Heart sounds: Normal heart sounds. No murmur heard.  Pulmonary:      Effort: Pulmonary effort is normal. No respiratory distress.      Breath sounds: Normal breath sounds.   Musculoskeletal:      Cervical back: Neck supple.      Right lower leg: No edema.      Left lower leg: No edema.   Neurological:      General: No focal deficit present.      Mental Status: She is alert.   Psychiatric:         Mood and Affect: Mood normal.         Behavior: Behavior normal.         Result Review :     The following data was reviewed by: TRINI Fontanez on 02/27/2025:                       Assessment and Plan      Diagnoses and all orders for this visit:    1. Acquired hypothyroidism (Primary)  Assessment & Plan:  Reviewed her chart and care everywhere.  Will go " ahead and recheck her TSH today, discussed thyroid conditions, weight gain, she brought up obesity and GLP 1, she is not interested in taking that type of rx, will continue her efforts with diet and regular exercise   To continue to take rx on empty stomach with no other food or rx    Orders:  -     TSH Rfx On Abnormal To Free T4    2. History of headache  Assessment & Plan:  She will use imitrex if needed, follow up if her symptoms return or change.        3. Pain of left heel  Assessment & Plan:  She will get the heel/shoe insert, follow up with podiatry as directed       4. History of asthma  Assessment & Plan:  She will continue efforts to stop vaping and take her inhaler prn                     Follow Up     Return if symptoms worsen or fail to improve, for followup pending lab results.    Patient was given instructions and counseling regarding her condition or for health maintenance advice. Please see specific information pulled into the AVS if appropriate.

## 2025-05-06 ENCOUNTER — OFFICE VISIT (OUTPATIENT)
Dept: FAMILY MEDICINE CLINIC | Age: 35
End: 2025-05-06
Payer: COMMERCIAL

## 2025-05-06 VITALS
HEART RATE: 74 BPM | WEIGHT: 185.6 LBS | DIASTOLIC BLOOD PRESSURE: 64 MMHG | HEIGHT: 61 IN | BODY MASS INDEX: 35.04 KG/M2 | OXYGEN SATURATION: 98 % | RESPIRATION RATE: 14 BRPM | SYSTOLIC BLOOD PRESSURE: 98 MMHG | TEMPERATURE: 98 F

## 2025-05-06 DIAGNOSIS — K58.0 IRRITABLE BOWEL SYNDROME WITH DIARRHEA: Primary | ICD-10-CM

## 2025-05-06 DIAGNOSIS — F41.9 ANXIETY: ICD-10-CM

## 2025-05-06 RX ORDER — DICYCLOMINE HYDROCHLORIDE 10 MG/1
10-20 CAPSULE ORAL
Qty: 40 CAPSULE | Refills: 0 | Status: SHIPPED | OUTPATIENT
Start: 2025-05-06

## 2025-05-06 RX ORDER — ESCITALOPRAM OXALATE 10 MG/1
10 TABLET ORAL
Qty: 30 TABLET | Refills: 0 | Status: SHIPPED | OUTPATIENT
Start: 2025-05-06

## 2025-05-06 NOTE — ASSESSMENT & PLAN NOTE
Orders:    escitalopram (Lexapro) 10 MG tablet; Take 1 tablet by mouth every night at bedtime.    
36.9

## 2025-05-06 NOTE — PROGRESS NOTES
Chief Complaint     Abdominal Pain (2 months. Mid stomach pain Starts early morning or late night ), Diarrhea, Nausea, and Chills    History of Present Illness     Hanna Matos is a 34 y.o. female who presents to [facility].     Patient or patient representative verbalized consent for the use of Ambient Listening during the visit with  TRINI Robert for chart documentation. 5/6/2025  14:01 EDT      History of Present Illness  The patient is a 34-year-old female who presents for evaluation of diarrhea and anxiety.    She has been experiencing intermittent episodes of diarrhea for the past few months, which she describes as similar to a stomach bug. These episodes are accompanied by severe abdominal pain, weakness, and shaking. The diarrhea typically lasts for 2 days, followed by a symptom-free period of 3 to 4 days before recurring. She reports no changes in her diet that could potentially trigger these symptoms. This morning, she experienced another episode of diarrhea, which was so severe that even water intake resulted in a bowel movement. She also reports nausea and chills during these episodes but does not experience any vomiting or constipation. The abdominal pain is described as dull and crampy.     She has a known history of anxiety but discontinued her medication due to its sedative effects, which made it difficult for her to wake up in the morning.      History      Past Medical History:   Diagnosis Date    Acute maxillary sinusitis, unspecified     Acute vaginitis     ADHD (attention deficit hyperactivity disorder) ?    Amenorrhea, unspecified     Anemia     Asthma     NO INHALERS    Carpal tunnel syndrome     RIGHT/SURGERY 02/2021    Cervical cancer     Cervicalgia     Endometriosis     ABN CELLS, CYSTS    Foot pain, left     Frequency of micturition     Hypothyroidism 2023    Localized swelling, mass and lump, head     Low back pain     Migraine with aura and  without status migrainosus, not intractable     Noninflammatory disorder of vagina, unspecified     Obesity     Other mixed anxiety disorders     Pain in right shoulder     Pain in thoracic spine     Pilonidal cyst with abscess     Pneumonia     PONV (postoperative nausea and vomiting)     Recurrent UTI     diagnosed at age 4       Past Surgical History:   Procedure Laterality Date    CARPAL TUNNEL RELEASE Right 1/15/2021    Procedure: Right Carpal Tunnel Release;  Surgeon: Osvaldo Strong MD;  [place]  [facility] [place] [facility] [place]  Service: Orthopedics;  Laterality: Right;    CERVICAL BIOPSY  W/ LOOP ELECTRODE EXCISION      PILONIDAL CYST DRAINAGE      TUBAL ABDOMINAL LIGATION      VAGINAL HYSTERECTOMY N/A 2021    Procedure: TOTAL VAGINAL HYSTERECTOMY;  Surgeon: Que Bo MD;  [facility];  Service: Gynecology;  Laterality: N/A;       Family History   Problem Relation Age of Onset    Heart disease Other     Diabetes Other     Lung cancer Other     Lymphoma Other     Breast cancer Other     Hypertension Other     ADD / ADHD Other     Lymphoma Father          at age 52    Cancer Father     Lung cancer Maternal Grandfather     Heart failure Paternal Grandfather     Diabetes type II Paternal Grandfather     Anxiety disorder Mother     Arthritis Mother     Asthma Mother     COPD Mother     Depression Mother     Diabetes Mother     Hyperlipidemia Mother     Thyroid disease Mother     Malig Hyperthermia Neg Hx         Current Medications        Current Outpatient Medications:     albuterol sulfate  (90 Base) MCG/ACT inhaler, Inhale 2 puffs Every 4 (Four) Hours As Needed for Wheezing., Disp: 18 g, Rfl: 0    levothyroxine (SYNTHROID, LEVOTHROID) 25 MCG tablet, TAKE 1 TABLET BY MOUTH EVERY MORNING, Disp: 90 tablet, Rfl: 1    SUMAtriptan (Imitrex) 25 MG tablet, Take one tablet by mouth at onset of headache. May repeat dose one time in 2 hours if headache not relieved., Disp: 9 tablet, Rfl: 0     "dicyclomine (BENTYL) 10 MG capsule, Take 1-2 capsules by mouth 4 (Four) Times a Day Before Meals & at Bedtime., Disp: 40 capsule, Rfl: 0    escitalopram (Lexapro) 10 MG tablet, Take 1 tablet by mouth every night at bedtime., Disp: 30 tablet, Rfl: 0     Allergies     No Known Allergies    Social History       Social History     Social History Narrative    Not on file       Immunizations     Immunization:  Immunization History   Administered Date(s) Administered    DTaP 01/10/1991, 05/09/1991, 07/11/1991, 03/25/1992, 05/01/1995    Flu Vaccine Quad PF >36MO 11/22/2002, 03/04/2019    Fluzone (or Fluarix & Flulaval for VFC) >6mos 11/22/2002, 03/04/2019    HPV Quadrivalent 03/07/2008, 05/01/2008, 09/05/2008    Hepatitis B Adult/Adolescent IM 08/29/2001, 05/17/2002    HiB 05/09/1991, 07/11/1991, 09/11/1991, 03/25/1992    Hib (PRP-T) 05/09/1991, 07/11/1991, 09/11/1991, 03/25/1992    IPV 01/10/1991, 05/09/1991, 03/25/1992, 05/01/1995    MMR 05/25/1992, 08/29/2001    Meningococcal Conjugate 09/13/2002    Td (TDVAX) 08/06/2002    Tdap 08/25/2015          Objective     Objective     Vital Signs:   BP 98/64 (BP Location: Left arm, Patient Position: Sitting, Cuff Size: Adult)   Pulse 74   Temp 98 °F (36.7 °C) (Oral)   Resp 14   Ht 154.9 cm (60.98\")   Wt 84.2 kg (185 lb 9.6 oz)   SpO2 98%   BMI 35.09 kg/m²       Physical Exam  Vitals and nursing note reviewed.   Constitutional:       Appearance: Normal appearance. She is obese.   HENT:      Head: Normocephalic.   Eyes:      Conjunctiva/sclera: Conjunctivae normal.      Pupils: Pupils are equal, round, and reactive to light.   Cardiovascular:      Rate and Rhythm: Normal rate and regular rhythm.      Pulses: Normal pulses.      Heart sounds: Normal heart sounds.   Pulmonary:      Effort: Pulmonary effort is normal.      Breath sounds: Normal breath sounds.   Abdominal:      General: Bowel sounds are normal.      Palpations: Abdomen is soft.      Tenderness: There is " abdominal tenderness.      Comments: Lower abdominal pain   Musculoskeletal:         General: Normal range of motion.      Cervical back: Normal range of motion and neck supple.   Skin:     General: Skin is warm and dry.   Neurological:      General: No focal deficit present.      Mental Status: She is alert and oriented to person, place, and time.   Psychiatric:         Attention and Perception: Attention normal.         Mood and Affect: Mood and affect normal.         Behavior: Behavior normal. Behavior is cooperative.         Physical Exam  Respiratory: Clear to auscultation, no wheezing, rales or rhonchi  Cardiovascular: Regular rate and rhythm, no murmurs, rubs, or gallops  Gastrointestinal: Mild tenderness on palpation of the abdomen      Results    The following data was reviewed by: TRINI Robert on 05/06/25               Results           Assessment and Plan        Assessment and Plan       Irritable bowel syndrome with diarrhea    Orders:    dicyclomine (BENTYL) 10 MG capsule; Take 1-2 capsules by mouth 4 (Four) Times a Day Before Meals & at Bedtime.    Anxiety    Orders:    escitalopram (Lexapro) 10 MG tablet; Take 1 tablet by mouth every night at bedtime.         Assessment & Plan  1. Irritable Bowel Syndrome (IBS).  - Symptoms include diarrhea, crampy abdominal pain, and nausea, which are suggestive of IBS.  - Physical exam reveals tenderness in the lower abdomen.  - Discussed the potential link between anxiety and IBS symptoms, and the impact of stress on gastrointestinal function.  - Bentyl will be prescribed to manage crampiness and reduce the frequency of bowel movements.    2. Anxiety.  - Anxiety symptoms are likely exacerbating IBS symptoms.  - Patient reports previous anxiety medication caused excessive drowsiness and was discontinued.  - Reviewed the patient's history of anxiety and discussed the need for a new medication to manage symptoms.  - A new medication for anxiety will be  prescribed to help manage her symptoms.        Follow Up        Follow Up   Return in about 4 weeks (around 6/3/2025) for With PCP, sooner if condition worsens.  Patient was given instructions and counseling regarding her condition or for health maintenance advice. Please see specific information pulled into the AVS if appropriate.

## 2025-08-05 ENCOUNTER — HOSPITAL ENCOUNTER (OUTPATIENT)
Dept: GENERAL RADIOLOGY | Facility: HOSPITAL | Age: 35
Discharge: HOME | End: 2025-08-05
Admitting: NURSE PRACTITIONER
Payer: COMMERCIAL

## 2025-08-05 ENCOUNTER — OFFICE VISIT (OUTPATIENT)
Dept: FAMILY MEDICINE CLINIC | Age: 35
End: 2025-08-05
Payer: COMMERCIAL

## 2025-08-05 VITALS
BODY MASS INDEX: 35.01 KG/M2 | HEART RATE: 77 BPM | HEIGHT: 61 IN | WEIGHT: 185.4 LBS | OXYGEN SATURATION: 97 % | DIASTOLIC BLOOD PRESSURE: 74 MMHG | SYSTOLIC BLOOD PRESSURE: 120 MMHG | TEMPERATURE: 98.1 F

## 2025-08-05 DIAGNOSIS — M25.561 ACUTE PAIN OF RIGHT KNEE: Primary | ICD-10-CM

## 2025-08-05 DIAGNOSIS — M25.561 ACUTE PAIN OF RIGHT KNEE: ICD-10-CM

## 2025-08-05 PROCEDURE — 1125F AMNT PAIN NOTED PAIN PRSNT: CPT | Performed by: NURSE PRACTITIONER

## 2025-08-05 PROCEDURE — 99213 OFFICE O/P EST LOW 20 MIN: CPT | Performed by: NURSE PRACTITIONER

## 2025-08-05 PROCEDURE — 1160F RVW MEDS BY RX/DR IN RCRD: CPT | Performed by: NURSE PRACTITIONER

## 2025-08-05 PROCEDURE — 1159F MED LIST DOCD IN RCRD: CPT | Performed by: NURSE PRACTITIONER

## 2025-08-05 PROCEDURE — 73562 X-RAY EXAM OF KNEE 3: CPT

## 2025-08-05 RX ORDER — ETODOLAC 400 MG/1
400 TABLET, FILM COATED ORAL 2 TIMES DAILY WITH MEALS
Qty: 60 TABLET | Refills: 1 | Status: SHIPPED | OUTPATIENT
Start: 2025-08-05

## 2025-08-05 RX ORDER — ETODOLAC 400 MG/1
400 TABLET, FILM COATED ORAL 2 TIMES DAILY WITH MEALS
Qty: 60 TABLET | Refills: 1 | Status: SHIPPED | OUTPATIENT
Start: 2025-08-05 | End: 2025-08-05

## 2025-08-26 ENCOUNTER — OFFICE VISIT (OUTPATIENT)
Dept: FAMILY MEDICINE CLINIC | Age: 35
End: 2025-08-26
Payer: COMMERCIAL

## 2025-08-26 VITALS
OXYGEN SATURATION: 96 % | SYSTOLIC BLOOD PRESSURE: 103 MMHG | DIASTOLIC BLOOD PRESSURE: 96 MMHG | BODY MASS INDEX: 35.04 KG/M2 | HEIGHT: 61 IN | TEMPERATURE: 98.6 F | RESPIRATION RATE: 18 BRPM | WEIGHT: 185.6 LBS | HEART RATE: 76 BPM

## 2025-08-26 DIAGNOSIS — R09.81 NASAL CONGESTION: ICD-10-CM

## 2025-08-26 DIAGNOSIS — H66.90 ACUTE OTITIS MEDIA, UNSPECIFIED OTITIS MEDIA TYPE: ICD-10-CM

## 2025-08-26 DIAGNOSIS — J06.9 VIRAL URI: Primary | ICD-10-CM

## 2025-08-26 DIAGNOSIS — R06.02 SHORT OF BREATH ON EXERTION: ICD-10-CM

## 2025-08-26 DIAGNOSIS — Z87.09 HISTORY OF ASTHMA: ICD-10-CM

## 2025-08-26 DIAGNOSIS — R53.83 OTHER FATIGUE: ICD-10-CM

## 2025-08-26 DIAGNOSIS — J02.9 SORE THROAT: ICD-10-CM

## 2025-08-26 DIAGNOSIS — R05.1 ACUTE COUGH: ICD-10-CM

## 2025-08-26 LAB
EXPIRATION DATE: NORMAL
EXPIRATION DATE: NORMAL
FLUAV AG UPPER RESP QL IA.RAPID: NOT DETECTED
FLUBV AG UPPER RESP QL IA.RAPID: NOT DETECTED
INTERNAL CONTROL: NORMAL
INTERNAL CONTROL: NORMAL
Lab: NORMAL
Lab: NORMAL
S PYO AG THROAT QL: NEGATIVE
SARS-COV-2 AG UPPER RESP QL IA.RAPID: NOT DETECTED

## 2025-08-26 PROCEDURE — 87880 STREP A ASSAY W/OPTIC: CPT | Performed by: STUDENT IN AN ORGANIZED HEALTH CARE EDUCATION/TRAINING PROGRAM

## 2025-08-26 PROCEDURE — 99214 OFFICE O/P EST MOD 30 MIN: CPT | Performed by: STUDENT IN AN ORGANIZED HEALTH CARE EDUCATION/TRAINING PROGRAM

## 2025-08-26 PROCEDURE — 87081 CULTURE SCREEN ONLY: CPT | Performed by: STUDENT IN AN ORGANIZED HEALTH CARE EDUCATION/TRAINING PROGRAM

## 2025-08-26 RX ORDER — ALBUTEROL SULFATE 0.83 MG/ML
2.5 SOLUTION RESPIRATORY (INHALATION) EVERY 4 HOURS PRN
Qty: 75 ML | Refills: 0 | Status: SHIPPED | OUTPATIENT
Start: 2025-08-26

## 2025-08-26 RX ORDER — BROMPHENIRAMINE MALEATE, PSEUDOEPHEDRINE HYDROCHLORIDE, AND DEXTROMETHORPHAN HYDROBROMIDE 2; 30; 10 MG/5ML; MG/5ML; MG/5ML
5 SYRUP ORAL 4 TIMES DAILY PRN
Qty: 240 ML | Refills: 0 | Status: SHIPPED | OUTPATIENT
Start: 2025-08-26

## 2025-08-26 RX ORDER — CEFDINIR 300 MG/1
300 CAPSULE ORAL 2 TIMES DAILY
Qty: 14 CAPSULE | Refills: 0 | Status: SHIPPED | OUTPATIENT
Start: 2025-08-26

## 2025-08-28 LAB — BACTERIA SPEC AEROBE CULT: NORMAL

## (undated) DEVICE — SUT VIC PLS CTD BR 0 TIE 18IN VIL

## (undated) DEVICE — DISPOSABLE TOURNIQUET CUFF SINGLE BLADDER, SINGLE PORT AND QUICK CONNECT CONNECTOR: Brand: COLOR CUFF

## (undated) DEVICE — MINOR-LF: Brand: MEDLINE INDUSTRIES, INC.

## (undated) DEVICE — WRIST AND FOREARM: Brand: DEROYAL

## (undated) DEVICE — NDL SPINE 22G 31/2IN BLK

## (undated) DEVICE — SLV SCD LEG COMFORT KENDALLSCD MD REPROC

## (undated) DEVICE — DISPOSABLE BIPOLAR FORCEPS 4" (10.2CM) JEWELERS, STRAIGHT 0.4MM TIP AND 12 FT. (3.6M) CABLE: Brand: KIRWAN

## (undated) DEVICE — PK ORTHO MINOR TOWER 40

## (undated) DEVICE — BNDG ELAS ELITE V/CLOSE 3IN 5YD LF STRL

## (undated) DEVICE — BLD SCLPL BEAVR MINI STR 2BVL 180D LF

## (undated) DEVICE — DRAPE,UNDERBUTTOCKS,PCH,STERILE: Brand: MEDLINE

## (undated) DEVICE — PREP TRAY WITH CHG: Brand: MEDLINE INDUSTRIES, INC.

## (undated) DEVICE — ANTIBACTERIAL VIOLET BRAIDED (POLYGLACTIN 910), SYNTHETIC ABSORBABLE SUTURE: Brand: COATED VICRYL

## (undated) DEVICE — SUT VIC 3/0 SH 27IN J416H

## (undated) DEVICE — TOWEL,OR,DSP,ST,BLUE,STD,4/PK,20PK/CS: Brand: MEDLINE

## (undated) DEVICE — SUT MNCRYL 4/0 PS2 18 IN

## (undated) DEVICE — UNDERCAST PADDING: Brand: DEROYAL

## (undated) DEVICE — STERILE POLYISOPRENE POWDER-FREE SURGICAL GLOVES: Brand: PROTEXIS

## (undated) DEVICE — DRSNG WND GZ CURAD OIL EMULSION 3X3IN STRL

## (undated) DEVICE — GOWN,REINFORCE,POLY,SIRUS,BREATH SLV,XLG: Brand: MEDLINE

## (undated) DEVICE — GLV SURG SENSICARE PI LF PF 8 GRN STRL

## (undated) DEVICE — SUT PDS2 0 CT1 27IN Z340H MF VIL

## (undated) DEVICE — PAD SANI MAXI W/ADHS SNG WRP 11IN

## (undated) DEVICE — PACK,LITHOTOMY,PK I: Brand: MEDLINE

## (undated) DEVICE — SKIN AFFIX SURG ADHESIVE 72/CS 0.55ML: Brand: MEDLINE

## (undated) DEVICE — APPL CHLORAPREP HI/LITE 26ML ORNG

## (undated) DEVICE — TRY FOL URINE METER STATLOCK 16F 5CC

## (undated) DEVICE — PAD GRND REM POLYHESIVE A/ DISP

## (undated) DEVICE — STRAP STIRUP SLP RNG 19X3.5IN DISP

## (undated) DEVICE — SYR CONTRL LUERLOK 10CC

## (undated) DEVICE — GLV SURG PREMIERPRO ORTHO LTX PF SZ8.5 BRN